# Patient Record
Sex: FEMALE | Race: BLACK OR AFRICAN AMERICAN | Employment: FULL TIME | URBAN - METROPOLITAN AREA
[De-identification: names, ages, dates, MRNs, and addresses within clinical notes are randomized per-mention and may not be internally consistent; named-entity substitution may affect disease eponyms.]

---

## 2017-05-25 ENCOUNTER — EMPLOYEE WELLNESS (OUTPATIENT)
Dept: OTHER | Age: 37
End: 2017-05-25

## 2017-05-25 LAB
CHOLESTEROL, TOTAL: 181 MG/DL (ref 0–199)
GLUCOSE BLD-MCNC: 83 MG/DL (ref 70–99)
HDLC SERPL-MCNC: 42 MG/DL (ref 40–60)
LDL CHOLESTEROL CALCULATED: 122 MG/DL
TRIGL SERPL-MCNC: 84 MG/DL (ref 0–150)

## 2017-07-03 ENCOUNTER — OFFICE VISIT (OUTPATIENT)
Dept: PRIMARY CARE CLINIC | Age: 37
End: 2017-07-03

## 2017-07-03 VITALS
TEMPERATURE: 97.4 F | HEIGHT: 60 IN | OXYGEN SATURATION: 97 % | SYSTOLIC BLOOD PRESSURE: 146 MMHG | HEART RATE: 94 BPM | BODY MASS INDEX: 45.35 KG/M2 | WEIGHT: 231 LBS | DIASTOLIC BLOOD PRESSURE: 92 MMHG

## 2017-07-03 DIAGNOSIS — M79.605 PAIN IN BOTH LOWER EXTREMITIES: Primary | ICD-10-CM

## 2017-07-03 DIAGNOSIS — M25.562 CHRONIC PAIN OF BOTH KNEES: ICD-10-CM

## 2017-07-03 DIAGNOSIS — M79.672 BILATERAL FOOT PAIN: ICD-10-CM

## 2017-07-03 DIAGNOSIS — M25.561 CHRONIC PAIN OF BOTH KNEES: ICD-10-CM

## 2017-07-03 DIAGNOSIS — G89.29 CHRONIC PAIN OF BOTH KNEES: ICD-10-CM

## 2017-07-03 DIAGNOSIS — E66.01 MORBID (SEVERE) OBESITY DUE TO EXCESS CALORIES (HCC): ICD-10-CM

## 2017-07-03 DIAGNOSIS — I10 ESSENTIAL HYPERTENSION: ICD-10-CM

## 2017-07-03 DIAGNOSIS — I83.90 VARICOSE VEIN OF LEG: ICD-10-CM

## 2017-07-03 DIAGNOSIS — E55.9 VITAMIN D DEFICIENCY: ICD-10-CM

## 2017-07-03 DIAGNOSIS — M79.604 PAIN IN BOTH LOWER EXTREMITIES: Primary | ICD-10-CM

## 2017-07-03 DIAGNOSIS — M79.671 BILATERAL FOOT PAIN: ICD-10-CM

## 2017-07-03 PROCEDURE — 99204 OFFICE O/P NEW MOD 45 MIN: CPT | Performed by: FAMILY MEDICINE

## 2017-07-03 RX ORDER — HYDROCHLOROTHIAZIDE 25 MG/1
TABLET ORAL
Qty: 90 TABLET | Refills: 3 | Status: SHIPPED | OUTPATIENT
Start: 2017-07-03 | End: 2018-10-04 | Stop reason: SDUPTHER

## 2017-07-03 RX ORDER — NAPROXEN 500 MG/1
500 TABLET ORAL 2 TIMES DAILY WITH MEALS
Qty: 60 TABLET | Refills: 3 | Status: SHIPPED | OUTPATIENT
Start: 2017-07-03 | End: 2017-07-22 | Stop reason: ALTCHOICE

## 2017-07-03 ASSESSMENT — ENCOUNTER SYMPTOMS
APNEA: 0
BACK PAIN: 0
SORE THROAT: 0
SINUS PRESSURE: 0
RECTAL PAIN: 0
EYE PAIN: 0
VOMITING: 0
WHEEZING: 0
COLOR CHANGE: 0
ABDOMINAL DISTENTION: 0
NAUSEA: 0
EYE DISCHARGE: 0
STRIDOR: 0
COUGH: 0
BLOOD IN STOOL: 0
SHORTNESS OF BREATH: 0
CHEST TIGHTNESS: 0
EYE REDNESS: 0
EYE ITCHING: 0
PHOTOPHOBIA: 0
RHINORRHEA: 0
TROUBLE SWALLOWING: 0
CONSTIPATION: 0
CHOKING: 0
DIARRHEA: 0

## 2017-07-03 ASSESSMENT — PATIENT HEALTH QUESTIONNAIRE - PHQ9
1. LITTLE INTEREST OR PLEASURE IN DOING THINGS: 0
2. FEELING DOWN, DEPRESSED OR HOPELESS: 0
SUM OF ALL RESPONSES TO PHQ QUESTIONS 1-9: 0
SUM OF ALL RESPONSES TO PHQ9 QUESTIONS 1 & 2: 0

## 2017-07-11 DIAGNOSIS — G89.29 CHRONIC PAIN OF BOTH KNEES: ICD-10-CM

## 2017-07-11 DIAGNOSIS — M79.672 BILATERAL FOOT PAIN: ICD-10-CM

## 2017-07-11 DIAGNOSIS — I83.90 VARICOSE VEIN OF LEG: ICD-10-CM

## 2017-07-11 DIAGNOSIS — M79.604 PAIN IN BOTH LOWER EXTREMITIES: ICD-10-CM

## 2017-07-11 DIAGNOSIS — M25.561 CHRONIC PAIN OF BOTH KNEES: ICD-10-CM

## 2017-07-11 DIAGNOSIS — E55.9 VITAMIN D DEFICIENCY: ICD-10-CM

## 2017-07-11 DIAGNOSIS — M79.671 BILATERAL FOOT PAIN: ICD-10-CM

## 2017-07-11 DIAGNOSIS — M79.605 PAIN IN BOTH LOWER EXTREMITIES: ICD-10-CM

## 2017-07-11 DIAGNOSIS — M25.562 CHRONIC PAIN OF BOTH KNEES: ICD-10-CM

## 2017-07-11 LAB
A/G RATIO: 1 (ref 1.1–2.2)
ALBUMIN SERPL-MCNC: 4 G/DL (ref 3.4–5)
ALP BLD-CCNC: 61 U/L (ref 40–129)
ALT SERPL-CCNC: 14 U/L (ref 10–40)
ANION GAP SERPL CALCULATED.3IONS-SCNC: 14 MMOL/L (ref 3–16)
AST SERPL-CCNC: 15 U/L (ref 15–37)
BACTERIA: ABNORMAL /HPF
BASOPHILS ABSOLUTE: 0 K/UL (ref 0–0.2)
BASOPHILS RELATIVE PERCENT: 0.5 %
BILIRUB SERPL-MCNC: <0.2 MG/DL (ref 0–1)
BILIRUBIN URINE: NEGATIVE
BLOOD, URINE: NEGATIVE
BUN BLDV-MCNC: 10 MG/DL (ref 7–20)
CALCIUM SERPL-MCNC: 9 MG/DL (ref 8.3–10.6)
CHLORIDE BLD-SCNC: 104 MMOL/L (ref 99–110)
CHOLESTEROL, TOTAL: 206 MG/DL (ref 0–199)
CLARITY: ABNORMAL
CO2: 25 MMOL/L (ref 21–32)
COLOR: YELLOW
CREAT SERPL-MCNC: 0.7 MG/DL (ref 0.6–1.1)
EOSINOPHILS ABSOLUTE: 0.4 K/UL (ref 0–0.6)
EOSINOPHILS RELATIVE PERCENT: 5.2 %
EPITHELIAL CELLS, UA: 9 /HPF (ref 0–5)
GFR AFRICAN AMERICAN: >60
GFR NON-AFRICAN AMERICAN: >60
GLOBULIN: 3.9 G/DL
GLUCOSE BLD-MCNC: 84 MG/DL (ref 70–99)
GLUCOSE URINE: NEGATIVE MG/DL
HCT VFR BLD CALC: 38.6 % (ref 36–48)
HDLC SERPL-MCNC: 47 MG/DL (ref 40–60)
HEMOGLOBIN: 12.6 G/DL (ref 12–16)
HYALINE CASTS: 1 /LPF (ref 0–8)
KETONES, URINE: NEGATIVE MG/DL
LDL CHOLESTEROL CALCULATED: 136 MG/DL
LEUKOCYTE ESTERASE, URINE: ABNORMAL
LYMPHOCYTES ABSOLUTE: 2.4 K/UL (ref 1–5.1)
LYMPHOCYTES RELATIVE PERCENT: 35.2 %
MAGNESIUM: 2.2 MG/DL (ref 1.8–2.4)
MCH RBC QN AUTO: 26.7 PG (ref 26–34)
MCHC RBC AUTO-ENTMCNC: 32.6 G/DL (ref 31–36)
MCV RBC AUTO: 81.9 FL (ref 80–100)
MICROSCOPIC EXAMINATION: YES
MONOCYTES ABSOLUTE: 0.2 K/UL (ref 0–1.3)
MONOCYTES RELATIVE PERCENT: 3.5 %
NEUTROPHILS ABSOLUTE: 3.8 K/UL (ref 1.7–7.7)
NEUTROPHILS RELATIVE PERCENT: 55.6 %
NITRITE, URINE: NEGATIVE
PDW BLD-RTO: 15 % (ref 12.4–15.4)
PH UA: 6.5
PLATELET # BLD: 381 K/UL (ref 135–450)
PMV BLD AUTO: 7.2 FL (ref 5–10.5)
POTASSIUM SERPL-SCNC: 4.3 MMOL/L (ref 3.5–5.1)
PROTEIN UA: NEGATIVE MG/DL
RBC # BLD: 4.72 M/UL (ref 4–5.2)
RBC UA: 8 /HPF (ref 0–4)
SEDIMENTATION RATE, ERYTHROCYTE: 54 MM/HR (ref 0–20)
SODIUM BLD-SCNC: 143 MMOL/L (ref 136–145)
SPECIFIC GRAVITY UA: 1.01
TOTAL PROTEIN: 7.9 G/DL (ref 6.4–8.2)
TRIGL SERPL-MCNC: 117 MG/DL (ref 0–150)
TSH SERPL DL<=0.05 MIU/L-ACNC: 1.55 UIU/ML (ref 0.27–4.2)
URIC ACID, SERUM: 5.7 MG/DL (ref 2.6–6)
URINE TYPE: ABNORMAL
UROBILINOGEN, URINE: 0.2 E.U./DL
VLDLC SERPL CALC-MCNC: 23 MG/DL
WBC # BLD: 6.8 K/UL (ref 4–11)
WBC UA: 5 /HPF (ref 0–5)

## 2017-07-12 ENCOUNTER — TELEPHONE (OUTPATIENT)
Dept: PRIMARY CARE CLINIC | Age: 37
End: 2017-07-12

## 2017-07-12 DIAGNOSIS — R70.0 ELEVATED SED RATE: ICD-10-CM

## 2017-07-12 DIAGNOSIS — R31.9 URINARY TRACT INFECTION WITH HEMATURIA, SITE UNSPECIFIED: Primary | ICD-10-CM

## 2017-07-12 DIAGNOSIS — M25.50 ARTHRALGIA, UNSPECIFIED JOINT: Primary | ICD-10-CM

## 2017-07-12 DIAGNOSIS — N39.0 URINARY TRACT INFECTION WITH HEMATURIA, SITE UNSPECIFIED: Primary | ICD-10-CM

## 2017-07-12 RX ORDER — NITROFURANTOIN 25; 75 MG/1; MG/1
CAPSULE ORAL
Qty: 14 CAPSULE | Refills: 0 | Status: SHIPPED | OUTPATIENT
Start: 2017-07-12 | End: 2017-07-22 | Stop reason: ALTCHOICE

## 2017-07-12 RX ORDER — NITROFURANTOIN MACROCRYSTALS 100 MG/1
CAPSULE ORAL
Qty: 14 CAPSULE | Refills: 0 | Status: SHIPPED | OUTPATIENT
Start: 2017-07-12 | End: 2017-07-12 | Stop reason: ALTCHOICE

## 2017-07-13 LAB
ANA INTERPRETATION: NORMAL
ANTI-NUCLEAR ANTIBODY (ANA): NEGATIVE

## 2017-07-14 ENCOUNTER — TELEPHONE (OUTPATIENT)
Dept: PRIMARY CARE CLINIC | Age: 37
End: 2017-07-14

## 2017-07-14 ENCOUNTER — HOSPITAL ENCOUNTER (OUTPATIENT)
Dept: OTHER | Age: 37
Discharge: HOME | End: 2017-07-14
Attending: FAMILY MEDICINE | Admitting: FAMILY MEDICINE

## 2017-07-14 DIAGNOSIS — M79.672 BILATERAL FOOT PAIN: ICD-10-CM

## 2017-07-14 DIAGNOSIS — M25.562 CHRONIC PAIN OF BOTH KNEES: ICD-10-CM

## 2017-07-14 DIAGNOSIS — G89.29 CHRONIC PAIN OF BOTH KNEES: ICD-10-CM

## 2017-07-14 DIAGNOSIS — M79.671 BILATERAL FOOT PAIN: ICD-10-CM

## 2017-07-14 DIAGNOSIS — M25.561 CHRONIC PAIN OF BOTH KNEES: ICD-10-CM

## 2017-07-14 LAB
ANA INTERPRETATION: ABNORMAL
ANA TITER: ABNORMAL
ANTI-NUCLEAR ANTIBODY (ANA): POSITIVE

## 2017-07-16 ENCOUNTER — TELEPHONE (OUTPATIENT)
Dept: PRIMARY CARE CLINIC | Age: 37
End: 2017-07-16

## 2017-07-17 ENCOUNTER — TELEPHONE (OUTPATIENT)
Dept: PRIMARY CARE CLINIC | Age: 37
End: 2017-07-17

## 2017-07-17 DIAGNOSIS — R76.8 ELEVATED ANTINUCLEAR ANTIBODY (ANA) LEVEL: ICD-10-CM

## 2017-07-17 DIAGNOSIS — M25.50 ARTHRALGIA, UNSPECIFIED JOINT: Primary | ICD-10-CM

## 2017-07-18 ENCOUNTER — TELEPHONE (OUTPATIENT)
Dept: INTERNAL MEDICINE CLINIC | Age: 37
End: 2017-07-18

## 2017-07-19 ENCOUNTER — OFFICE VISIT (OUTPATIENT)
Dept: URGENT CARE | Age: 37
End: 2017-07-19

## 2017-07-19 ENCOUNTER — TELEPHONE (OUTPATIENT)
Dept: PRIMARY CARE CLINIC | Age: 37
End: 2017-07-19

## 2017-07-19 VITALS
SYSTOLIC BLOOD PRESSURE: 167 MMHG | HEART RATE: 77 BPM | HEIGHT: 60 IN | WEIGHT: 228 LBS | RESPIRATION RATE: 16 BRPM | DIASTOLIC BLOOD PRESSURE: 98 MMHG | TEMPERATURE: 98 F | BODY MASS INDEX: 44.76 KG/M2

## 2017-07-19 DIAGNOSIS — R76.8 ELEVATED ANTINUCLEAR ANTIBODY (ANA) LEVEL: ICD-10-CM

## 2017-07-19 DIAGNOSIS — R52 GENERALIZED PAIN: Primary | ICD-10-CM

## 2017-07-19 DIAGNOSIS — R70.0 ELEVATED SED RATE: ICD-10-CM

## 2017-07-19 PROCEDURE — 99203 OFFICE O/P NEW LOW 30 MIN: CPT | Performed by: EMERGENCY MEDICINE

## 2017-07-19 RX ORDER — PREDNISONE 10 MG/1
TABLET ORAL
Qty: 23 TABLET | Refills: 0 | Status: SHIPPED | OUTPATIENT
Start: 2017-07-19 | End: 2017-07-22 | Stop reason: ALTCHOICE

## 2017-07-19 RX ORDER — METHYLPREDNISOLONE 4 MG/1
TABLET ORAL
Qty: 1 KIT | Refills: 0 | Status: SHIPPED | OUTPATIENT
Start: 2017-07-19 | End: 2017-07-22 | Stop reason: ALTCHOICE

## 2017-07-19 ASSESSMENT — ENCOUNTER SYMPTOMS
EYE DISCHARGE: 0
SORE THROAT: 0
NAUSEA: 0
COUGH: 0
ABDOMINAL PAIN: 0
RHINORRHEA: 0
SHORTNESS OF BREATH: 0
VOMITING: 0
EYE PAIN: 0
DIARRHEA: 0
TROUBLE SWALLOWING: 0
EYES NEGATIVE: 1

## 2017-08-22 ENCOUNTER — OFFICE VISIT (OUTPATIENT)
Dept: RHEUMATOLOGY | Age: 37
End: 2017-08-22

## 2017-08-22 VITALS
HEART RATE: 78 BPM | OXYGEN SATURATION: 96 % | DIASTOLIC BLOOD PRESSURE: 90 MMHG | HEIGHT: 60 IN | SYSTOLIC BLOOD PRESSURE: 150 MMHG

## 2017-08-22 DIAGNOSIS — M25.50 ARTHRALGIA, UNSPECIFIED JOINT: ICD-10-CM

## 2017-08-22 DIAGNOSIS — R76.8 POSITIVE ANA (ANTINUCLEAR ANTIBODY): ICD-10-CM

## 2017-08-22 DIAGNOSIS — M72.2 PLANTAR FASCIITIS: Primary | ICD-10-CM

## 2017-08-22 DIAGNOSIS — M72.2 PLANTAR FASCIITIS: ICD-10-CM

## 2017-08-22 DIAGNOSIS — R70.0 ELEVATED SED RATE: ICD-10-CM

## 2017-08-22 LAB
C-REACTIVE PROTEIN: 32.7 MG/L (ref 0–5.1)
HEPATITIS B CORE IGM ANTIBODY: NORMAL
HEPATITIS B SURFACE ANTIGEN INTERPRETATION: NORMAL
HEPATITIS C ANTIBODY INTERPRETATION: NORMAL
RHEUMATOID FACTOR: 35 IU/ML
SEDIMENTATION RATE, ERYTHROCYTE: 63 MM/HR (ref 0–20)

## 2017-08-22 PROCEDURE — 99244 OFF/OP CNSLTJ NEW/EST MOD 40: CPT | Performed by: INTERNAL MEDICINE

## 2017-08-23 LAB
ANA INTERPRETATION: NORMAL
ANTI-NUCLEAR ANTIBODY (ANA): NEGATIVE
ENA TO RNP ANTIBODY: NEGATIVE EU
ENA TO SMITH (SM) ANTIBODY: NEGATIVE EU
ENA TO SSA (RO) ANTIBODY: NEGATIVE EU
ENA TO SSB (LA) ANTIBODY: NEGATIVE EU

## 2017-08-24 LAB
ANA BY IFA: NORMAL
CCP IGG ANTIBODIES: 2 UNITS (ref 0–19)
DOUBLE STRANDED DNA AB, IGG: NORMAL

## 2017-11-14 ENCOUNTER — CLINICAL DOCUMENTATION (OUTPATIENT)
Dept: URGENT CARE | Age: 37
End: 2017-11-14

## 2017-11-14 NOTE — PROGRESS NOTES
Marvin Pastor is a 43 y.o. female patient. Current Outpatient Prescriptions   Medication Sig Dispense Refill    ibuprofen (ADVIL;MOTRIN) 800 MG tablet Take 1 tablet by mouth every 8 hours as needed for Pain 60 tablet 0    hydrochlorothiazide (HYDRODIURIL) 25 MG tablet TAKE 1 TABLET BY MOUTH EVERY DAY 90 tablet 3     No current facility-administered medications for this visit. No Known Allergies  Active Problems:    * No active hospital problems. *    not currently breastfeeding. Subjective  Objective:  Vital signs: (most recent): not currently breastfeeding. (Blood Pressure    Right arm 154/101  Left arm 155/99).       Assessment & Plan    Criselda Reis  11/14/2017

## 2017-12-08 ENCOUNTER — OFFICE VISIT (OUTPATIENT)
Dept: URGENT CARE | Age: 37
End: 2017-12-08

## 2017-12-08 VITALS
WEIGHT: 220 LBS | RESPIRATION RATE: 16 BRPM | BODY MASS INDEX: 43.19 KG/M2 | OXYGEN SATURATION: 98 % | DIASTOLIC BLOOD PRESSURE: 100 MMHG | HEIGHT: 60 IN | SYSTOLIC BLOOD PRESSURE: 171 MMHG | HEART RATE: 99 BPM | TEMPERATURE: 97.7 F

## 2017-12-08 DIAGNOSIS — J02.9 PHARYNGITIS, UNSPECIFIED ETIOLOGY: Primary | ICD-10-CM

## 2017-12-08 DIAGNOSIS — I10 ESSENTIAL HYPERTENSION: ICD-10-CM

## 2017-12-08 LAB — S PYO AG THROAT QL: NORMAL

## 2017-12-08 PROCEDURE — 87880 STREP A ASSAY W/OPTIC: CPT | Performed by: EMERGENCY MEDICINE

## 2017-12-08 PROCEDURE — G8427 DOCREV CUR MEDS BY ELIG CLIN: HCPCS | Performed by: EMERGENCY MEDICINE

## 2017-12-08 PROCEDURE — G8484 FLU IMMUNIZE NO ADMIN: HCPCS | Performed by: EMERGENCY MEDICINE

## 2017-12-08 PROCEDURE — 1036F TOBACCO NON-USER: CPT | Performed by: EMERGENCY MEDICINE

## 2017-12-08 PROCEDURE — G8417 CALC BMI ABV UP PARAM F/U: HCPCS | Performed by: EMERGENCY MEDICINE

## 2017-12-08 PROCEDURE — 99214 OFFICE O/P EST MOD 30 MIN: CPT | Performed by: EMERGENCY MEDICINE

## 2017-12-08 ASSESSMENT — ENCOUNTER SYMPTOMS
COUGH: 0
SORE THROAT: 1

## 2017-12-08 NOTE — PROGRESS NOTES
Subjective:      Patient ID: Nilda Osei is a 43 y.o. female. Pharyngitis   This is a new problem. Episode onset: 2 days ago. The problem occurs constantly. The problem has been gradually worsening. Associated symptoms include a sore throat. Pertinent negatives include no chills, coughing, fever, headaches or rash. The symptoms are aggravated by swallowing. Treatments tried: OTC cold meds. The treatment provided no relief. Review of Systems   Constitutional: Negative for chills and fever. HENT: Positive for sore throat. Respiratory: Negative for cough. Skin: Negative for rash. Neurological: Negative for headaches. All other systems reviewed and are negative. Objective:   Physical Exam   Constitutional: She is oriented to person, place, and time. She appears well-developed and well-nourished. No distress. HENT:   Head: Normocephalic and atraumatic. Right Ear: External ear normal.   Left Ear: External ear normal.   Nose: Nose normal.   Mouth/Throat: Posterior oropharyngeal erythema present. No oropharyngeal exudate. Eyes: Conjunctivae and EOM are normal. Pupils are equal, round, and reactive to light. Neck: Normal range of motion. Neck supple. No JVD present. Cardiovascular: Normal rate, regular rhythm, normal heart sounds and intact distal pulses. Pulmonary/Chest: Effort normal and breath sounds normal. No stridor. No respiratory distress. She has no wheezes. She has no rales. Musculoskeletal: Normal range of motion. Lymphadenopathy:     She has no cervical adenopathy. Neurological: She is alert and oriented to person, place, and time. She has normal reflexes. Skin: Skin is warm and dry. She is not diaphoretic. Psychiatric: She has a normal mood and affect. Her behavior is normal. Judgment and thought content normal.   Nursing note and vitals reviewed. Assessment:      1. Pharyngitis, unspecified etiology    2.  Essential hypertension            Plan: Sea was seen today for pharyngitis. Diagnoses and all orders for this visit:    Pharyngitis, unspecified etiology  -     POCT rapid strep A    Essential hypertension      Rapid strep was negative. I explained to the patient that she likely has a viral illness. Patient to follow up with her PCP regarding her blood pressure.

## 2017-12-08 NOTE — PATIENT INSTRUCTIONS
feeling in the chest.  ¨ Sweating. ¨ Shortness of breath. ¨ Nausea or vomiting. ¨ Pain, pressure, or a strange feeling in the back, neck, jaw, or upper belly or in one or both shoulders or arms. ¨ Lightheadedness or sudden weakness. ¨ A fast or irregular heartbeat. ? · You have symptoms of a stroke. These may include:  ¨ Sudden numbness, tingling, weakness, or loss of movement in your face, arm, or leg, especially on only one side of your body. ¨ Sudden vision changes. ¨ Sudden trouble speaking. ¨ Sudden confusion or trouble understanding simple statements. ¨ Sudden problems with walking or balance. ¨ A sudden, severe headache that is different from past headaches. ? · You have severe back or belly pain. ?Do not wait until your blood pressure comes down on its own. Get help right away. ?Call your doctor now or seek immediate care if:  ? · Your blood pressure is much higher than normal (such as 180/110 or higher), but you don't have symptoms. ? · You think high blood pressure is causing symptoms, such as:  ¨ Severe headache. ¨ Blurry vision. ? Watch closely for changes in your health, and be sure to contact your doctor if:  ? · Your blood pressure measures 140/90 or higher at least 2 times. That means the top number is 140 or higher or the bottom number is 90 or higher, or both. ? · You think you may be having side effects from your blood pressure medicine. ? · Your blood pressure is usually normal, but it goes above normal at least 2 times. Where can you learn more? Go to https://YAMAPpeArborMetrix.[employer]. org and sign in to your MoodMe account. Enter E748 in the menuvox box to learn more about \"High Blood Pressure: Care Instructions. \"     If you do not have an account, please click on the \"Sign Up Now\" link. Current as of: September 21, 2016  Content Version: 11.4  © 3281-4204 Healthwise, [employer]. Care instructions adapted under license by [facility] giddy [facility] ([facility]).  If you have questions about a medical condition or this instruction, always ask your healthcare professional. [address] any warranty or liability for your use of this information. Patient Education        Sore Throat: Care Instructions  Your Care Instructions    Infection by bacteria or a virus causes most sore throats. Cigarette smoke, dry air, air pollution, allergies, and yelling can also cause a sore throat. Sore throats can be painful and annoying. Fortunately, most sore throats go away on their own. If you have a bacterial infection, your doctor may prescribe antibiotics. Follow-up care is a key part of your treatment and safety. Be sure to make and go to all appointments, and call your doctor if you are having problems. It's also a good idea to know your test results and keep a list of the medicines you take. How can you care for yourself at home? · If your doctor prescribed antibiotics, take them as directed. Do not stop taking them just because you feel better. You need to take the full course of antibiotics. · Gargle with warm salt water once an hour to help reduce swelling and relieve discomfort. Use 1 teaspoon of salt mixed in 1 cup of warm water. · Take an over-the-counter pain medicine, such as acetaminophen (Tylenol), ibuprofen (Advil, Motrin), or naproxen (Aleve). Read and follow all instructions on the label. · Be careful when taking over-the-counter cold or flu medicines and Tylenol at the same time. Many of these medicines have acetaminophen, which is Tylenol. Read the labels to make sure that you are not taking more than the recommended dose. Too much acetaminophen (Tylenol) can be harmful. · Drink plenty of fluids. Fluids may help soothe an irritated throat. Hot fluids, such as tea or soup, may help decrease throat pain. · Use over-the-counter throat lozenges to soothe pain. Regular cough drops or hard candy may also help.  These should not be given to young children because of the risk of choking. · Do not smoke or allow others to smoke around you. If you need help quitting, talk to your doctor about stop-smoking programs and medicines. These can increase your chances of quitting for good. · Use a vaporizer or humidifier to add moisture to your bedroom. Follow the directions for cleaning the machine. When should you call for help? Call your doctor now or seek immediate medical care if:  ? · You have new or worse trouble swallowing. ? · Your sore throat gets much worse on one side. ? Watch closely for changes in your health, and be sure to contact your doctor if you do not get better as expected. Where can you learn more? Go to https://TopTechPhotoeb.[employer]i. org and sign in to your Temptster account. Enter Z946 in the Growlife box to learn more about \"Sore Throat: Care Instructions. \"     If you do not have an account, please click on the \"Sign Up Now\" link. Current as of: May 12, 2017  Content Version: 11.4  © 3655-7481 Healthwise, Incorporated. Care instructions adapted under license by [facility] ([facility]). If you have questions about a medical condition or this instruction, always ask your healthcare professional. [address] any warranty or liability for your use of this information.

## 2018-02-21 ENCOUNTER — OFFICE VISIT (OUTPATIENT)
Dept: URGENT CARE | Age: 38
End: 2018-02-21

## 2018-02-21 VITALS
DIASTOLIC BLOOD PRESSURE: 91 MMHG | WEIGHT: 223 LBS | TEMPERATURE: 99 F | RESPIRATION RATE: 14 BRPM | HEART RATE: 77 BPM | BODY MASS INDEX: 43.55 KG/M2 | OXYGEN SATURATION: 95 % | SYSTOLIC BLOOD PRESSURE: 142 MMHG

## 2018-02-21 DIAGNOSIS — R03.0 ELEVATED BLOOD PRESSURE READING: ICD-10-CM

## 2018-02-21 DIAGNOSIS — N93.9 VAGINAL SPOTTING: Primary | ICD-10-CM

## 2018-02-21 LAB
BILIRUBIN, POC: NEGATIVE
BLOOD URINE, POC: NEGATIVE
CLARITY, POC: CLEAR
COLOR, POC: YELLOW
CONTROL: PRESENT
GLUCOSE URINE, POC: NEGATIVE
KETONES, POC: NEGATIVE
LEUKOCYTE EST, POC: ABNORMAL
NITRITE, POC: NEGATIVE
PH, POC: 6
PREGNANCY TEST URINE, POC: NEGATIVE
PROTEIN, POC: ABNORMAL
SPECIFIC GRAVITY, POC: 1.02
UROBILINOGEN, POC: 0.2

## 2018-02-21 PROCEDURE — 81002 URINALYSIS NONAUTO W/O SCOPE: CPT | Performed by: EMERGENCY MEDICINE

## 2018-02-21 PROCEDURE — G8417 CALC BMI ABV UP PARAM F/U: HCPCS | Performed by: EMERGENCY MEDICINE

## 2018-02-21 PROCEDURE — G8484 FLU IMMUNIZE NO ADMIN: HCPCS | Performed by: EMERGENCY MEDICINE

## 2018-02-21 PROCEDURE — 99214 OFFICE O/P EST MOD 30 MIN: CPT | Performed by: EMERGENCY MEDICINE

## 2018-02-21 PROCEDURE — 1036F TOBACCO NON-USER: CPT | Performed by: EMERGENCY MEDICINE

## 2018-02-21 PROCEDURE — 81025 URINE PREGNANCY TEST: CPT | Performed by: EMERGENCY MEDICINE

## 2018-02-21 PROCEDURE — G8427 DOCREV CUR MEDS BY ELIG CLIN: HCPCS | Performed by: EMERGENCY MEDICINE

## 2018-02-21 ASSESSMENT — ENCOUNTER SYMPTOMS
COUGH: 0
VOMITING: 0
EYES NEGATIVE: 1
SHORTNESS OF BREATH: 0
ABDOMINAL PAIN: 0
NAUSEA: 0

## 2018-02-21 NOTE — PATIENT INSTRUCTIONS
blood pressure if your top number is 140 or higher or your bottom number is 90 or higher, or both. The main test for high blood pressure is simple, fast, and painless. To diagnose high blood pressure, your doctor will test your blood pressure at different times. After testing your blood pressure, your doctor may ask you to test it again when you are home. If you are diagnosed with high blood pressure, you can work with your doctor to make a long-term plan to manage it. Follow-up care is a key part of your treatment and safety. Be sure to make and go to all appointments, and call your doctor if you are having problems. It's also a good idea to know your test results and keep a list of the medicines you take. How can you care for yourself at home? · Do not smoke. Smoking increases your risk for heart attack and stroke. If you need help quitting, talk to your doctor about stop-smoking programs and medicines. These can increase your chances of quitting for good. · Stay at a healthy weight. · Try to limit how much sodium you eat to less than 2,300 milligrams (mg) a day. Your doctor may ask you to try to eat less than 1,500 mg a day. · Be physically active. Get at least 30 minutes of exercise on most days of the week. Walking is a good choice. You also may want to do other activities, such as running, swimming, cycling, or playing tennis or team sports. · Avoid or limit alcohol. Talk to your doctor about whether you can drink any alcohol. · Eat plenty of fruits, vegetables, and low-fat dairy products. Eat less saturated and total fats. · Learn how to check your blood pressure at home. When should you call for help? Call your doctor now or seek immediate medical care if:  ? · Your blood pressure is much higher than normal (such as 180/110 or higher). ? · You think high blood pressure is causing symptoms such as:  ¨ Severe headache. ¨ Blurry vision. ? Watch closely for changes in your health, and be sure to

## 2018-02-21 NOTE — PROGRESS NOTES
other serious conditions can cause bleeding. So you may need more tests to find the cause of your bleeding. The doctor has checked you carefully, but problems can develop later. If you notice any problems or new symptoms, get medical treatment right away. Follow-up care is a key part of your treatment and safety. Be sure to make and go to all appointments, and call your doctor if you are having problems. It's also a good idea to know your test results and keep a list of the medicines you take. How can you care for yourself at home? · Take pain medicines exactly as directed. ¨ If the doctor gave you a prescription medicine for pain, take it as prescribed. ¨ If you are not taking a prescription pain medicine, ask your doctor if you can take an over-the-counter medicine. Do not take aspirin, which may make bleeding worse. · If your doctor prescribed birth control pills for your bleeding, take them as directed. · Eat foods that are high in iron and vitamin C. Foods high in iron include red meat, shellfish, eggs, beans, and leafy green vegetables. Foods high in vitamin C include citrus fruits, tomatoes, and broccoli. Ask your doctor if you need to take iron pills or a multivitamin. · Ask your doctor when it is okay to have sex. When should you call for help? Call 911 anytime you think you may need emergency care. For example, call if:  ? · You passed out (lost consciousness). ?Call your doctor now or seek immediate medical care if:  ? · You have severe vaginal bleeding. ? · You are dizzy or lightheaded, or you feel like you may faint. ? · You have new or worse belly or pelvic pain. ? Watch closely for changes in your health, and be sure to contact your doctor if:  ? · Your bleeding gets worse. ? · You think you might be pregnant. ? · You do not get better as expected. Where can you learn more? Go to https://anjel.[employer]. org and sign in to your Double Fusion account.  Enter C201 in the Search Health Information box to learn more about \"Vaginal Bleeding in Nonpregnant Women: Care Instructions. \"     If you do not have an account, please click on the \"Sign Up Now\" link. Current as of: October 13, 2016  Content Version: 11.5  © 5699-8984 Healthwise, [employer]. Care instructions adapted under license by [facility] ([facility]). If you have questions about a medical condition or this instruction, always ask your healthcare professional. [address] any warranty or liability for your use of this information. Patient Education        Elevated Blood Pressure: Care Instructions  Your Care Instructions    Blood pressure is a measure of how hard the blood pushes against the walls of your arteries. It's normal for blood pressure to go up and down throughout the day. But if it stays up over time, you have high blood pressure. Two numbers tell you your blood pressure. The first number is the systolic pressure. It shows how hard the blood pushes when your heart is pumping. The second number is the diastolic pressure. It shows how hard the blood pushes between heartbeats, when your heart is relaxed and filling with blood. An ideal blood pressure in adults is less than 120/80 (say \"120 over 80\"). High blood pressure is 140/90 or higher. You have high blood pressure if your top number is 140 or higher or your bottom number is 90 or higher, or both. The main test for high blood pressure is simple, fast, and painless. To diagnose high blood pressure, your doctor will test your blood pressure at different times. After testing your blood pressure, your doctor may ask you to test it again when you are home. If you are diagnosed with high blood pressure, you can work with your doctor to make a long-term plan to manage it. Follow-up care is a key part of your treatment and safety. Be sure to make and go to all appointments, and call your doctor if you are having problems.  It's also a good idea to disclaims any warranty or liability for your use of this information.

## 2018-03-20 VITALS — WEIGHT: 224 LBS | BODY MASS INDEX: 43.75 KG/M2

## 2018-05-21 ENCOUNTER — OFFICE VISIT (OUTPATIENT)
Dept: OBGYN CLINIC | Age: 38
End: 2018-05-21

## 2018-05-21 VITALS
DIASTOLIC BLOOD PRESSURE: 80 MMHG | TEMPERATURE: 98.2 F | BODY MASS INDEX: 43.51 KG/M2 | WEIGHT: 221.6 LBS | SYSTOLIC BLOOD PRESSURE: 132 MMHG | HEIGHT: 60 IN | HEART RATE: 77 BPM

## 2018-05-21 DIAGNOSIS — Z11.3 SCREEN FOR STD (SEXUALLY TRANSMITTED DISEASE): ICD-10-CM

## 2018-05-21 DIAGNOSIS — Z01.419 WOMEN'S ANNUAL ROUTINE GYNECOLOGICAL EXAMINATION: Primary | ICD-10-CM

## 2018-05-21 DIAGNOSIS — E66.01 MORBID OBESITY WITH BMI OF 40.0-44.9, ADULT (HCC): ICD-10-CM

## 2018-05-21 DIAGNOSIS — N93.9 ABNORMAL UTERINE BLEEDING: ICD-10-CM

## 2018-05-21 DIAGNOSIS — Z12.4 PAP SMEAR FOR CERVICAL CANCER SCREENING: ICD-10-CM

## 2018-05-21 DIAGNOSIS — I10 HYPERTENSION, UNSPECIFIED TYPE: ICD-10-CM

## 2018-05-21 PROCEDURE — 99385 PREV VISIT NEW AGE 18-39: CPT | Performed by: OBSTETRICS & GYNECOLOGY

## 2018-05-21 PROCEDURE — 36415 COLL VENOUS BLD VENIPUNCTURE: CPT | Performed by: OBSTETRICS & GYNECOLOGY

## 2018-05-22 LAB
BASOPHILS ABSOLUTE: 0.1 K/UL (ref 0–0.2)
BASOPHILS RELATIVE PERCENT: 1 %
C TRACH DNA GENITAL QL NAA+PROBE: NEGATIVE
EOSINOPHILS ABSOLUTE: 0.4 K/UL (ref 0–0.6)
EOSINOPHILS RELATIVE PERCENT: 4.3 %
HCT VFR BLD CALC: 36.8 % (ref 36–48)
HEMOGLOBIN: 12.2 G/DL (ref 12–16)
LYMPHOCYTES ABSOLUTE: 3.5 K/UL (ref 1–5.1)
LYMPHOCYTES RELATIVE PERCENT: 40 %
MCH RBC QN AUTO: 26.6 PG (ref 26–34)
MCHC RBC AUTO-ENTMCNC: 33.3 G/DL (ref 31–36)
MCV RBC AUTO: 79.8 FL (ref 80–100)
MISCELLANEOUS LAB TEST ORDER: NORMAL
MONOCYTES ABSOLUTE: 0.5 K/UL (ref 0–1.3)
MONOCYTES RELATIVE PERCENT: 5.8 %
N. GONORRHOEAE DNA: NEGATIVE
NEUTROPHILS ABSOLUTE: 4.3 K/UL (ref 1.7–7.7)
NEUTROPHILS RELATIVE PERCENT: 48.9 %
PDW BLD-RTO: 15.4 % (ref 12.4–15.4)
PLATELET # BLD: 400 K/UL (ref 135–450)
PLATELET SLIDE REVIEW: ABNORMAL
PMV BLD AUTO: 7.9 FL (ref 5–10.5)
RBC # BLD: 4.61 M/UL (ref 4–5.2)
RPR: NORMAL
SLIDE REVIEW: ABNORMAL
TSH REFLEX: 1.23 UIU/ML (ref 0.27–4.2)
WBC # BLD: 8.8 K/UL (ref 4–11)

## 2018-05-23 LAB
HERPES TYPE 1/2 IGM COMBINED: 1.04 IV
HERPES TYPE I/II IGG COMBINED: >22.4 IV
HIV AG/AB: NORMAL
HIV ANTIGEN: NORMAL
HIV-1 ANTIBODY: NORMAL
HIV-2 AB: NORMAL
HPV COMMENT: ABNORMAL
HPV TYPE 16: NOT DETECTED
HPV TYPE 18: NOT DETECTED
HPVOH (OTHER TYPES): DETECTED
HSV 1 GLYCOPROTEIN G AB IGG: 21.1 IV
HSV 2 GLYCOPROTEIN G AB IGG: 19.4 IV

## 2018-05-25 ENCOUNTER — EMPLOYEE WELLNESS (OUTPATIENT)
Dept: OTHER | Age: 38
End: 2018-05-25

## 2018-05-25 LAB
CHOLESTEROL, TOTAL: 199 MG/DL (ref 0–199)
GLUCOSE BLD-MCNC: 86 MG/DL (ref 70–99)
HDLC SERPL-MCNC: 44 MG/DL (ref 40–60)
LDL CHOLESTEROL CALCULATED: 141 MG/DL
TRIGL SERPL-MCNC: 71 MG/DL (ref 0–150)

## 2018-05-28 PROBLEM — E66.01 MORBID OBESITY WITH BMI OF 40.0-44.9, ADULT (HCC): Status: ACTIVE | Noted: 2018-05-28

## 2018-05-28 ASSESSMENT — ENCOUNTER SYMPTOMS
SHORTNESS OF BREATH: 0
ABDOMINAL DISTENTION: 0
CONSTIPATION: 0
ABDOMINAL PAIN: 0
NAUSEA: 0
DIARRHEA: 0
VOMITING: 0

## 2018-05-29 VITALS — BODY MASS INDEX: 43.16 KG/M2 | WEIGHT: 221 LBS

## 2018-06-12 ENCOUNTER — OFFICE VISIT (OUTPATIENT)
Dept: OBGYN CLINIC | Age: 38
End: 2018-06-12

## 2018-06-12 VITALS
HEART RATE: 84 BPM | DIASTOLIC BLOOD PRESSURE: 84 MMHG | WEIGHT: 221.4 LBS | HEIGHT: 60 IN | SYSTOLIC BLOOD PRESSURE: 128 MMHG | TEMPERATURE: 98.4 F | BODY MASS INDEX: 43.47 KG/M2

## 2018-06-12 DIAGNOSIS — E66.01 MORBID OBESITY WITH BMI OF 40.0-44.9, ADULT (HCC): ICD-10-CM

## 2018-06-12 DIAGNOSIS — R87.612 PAP SMEAR ABNORMALITY OF CERVIX WITH LGSIL: Primary | ICD-10-CM

## 2018-06-12 DIAGNOSIS — R87.810 CERVICAL HIGH RISK HPV (HUMAN PAPILLOMAVIRUS) TEST POSITIVE: ICD-10-CM

## 2018-06-12 PROCEDURE — 99999 PR OFFICE/OUTPT VISIT,PROCEDURE ONLY: CPT | Performed by: OBSTETRICS & GYNECOLOGY

## 2018-06-12 PROCEDURE — 57454 BX/CURETT OF CERVIX W/SCOPE: CPT | Performed by: OBSTETRICS & GYNECOLOGY

## 2018-06-19 ENCOUNTER — TELEPHONE (OUTPATIENT)
Dept: OBGYN CLINIC | Age: 38
End: 2018-06-19

## 2018-06-26 ENCOUNTER — OFFICE VISIT (OUTPATIENT)
Dept: OBGYN CLINIC | Age: 38
End: 2018-06-26

## 2018-06-26 VITALS
HEART RATE: 80 BPM | DIASTOLIC BLOOD PRESSURE: 72 MMHG | SYSTOLIC BLOOD PRESSURE: 128 MMHG | TEMPERATURE: 98.2 F | BODY MASS INDEX: 43 KG/M2 | WEIGHT: 220.2 LBS

## 2018-06-26 DIAGNOSIS — N87.9 DYSPLASIA OF CERVIX UTERI: ICD-10-CM

## 2018-06-26 DIAGNOSIS — N92.6 IRREGULAR MENSES: Primary | ICD-10-CM

## 2018-06-26 DIAGNOSIS — D25.9 UTERINE LEIOMYOMA, UNSPECIFIED LOCATION: ICD-10-CM

## 2018-06-26 DIAGNOSIS — D25.2 SUBSEROUS LEIOMYOMA OF UTERUS: Primary | ICD-10-CM

## 2018-06-26 DIAGNOSIS — E66.01 MORBID OBESITY WITH BMI OF 40.0-44.9, ADULT (HCC): ICD-10-CM

## 2018-06-26 PROCEDURE — 99213 OFFICE O/P EST LOW 20 MIN: CPT | Performed by: OBSTETRICS & GYNECOLOGY

## 2018-06-26 PROCEDURE — 76856 US EXAM PELVIC COMPLETE: CPT | Performed by: OBSTETRICS & GYNECOLOGY

## 2018-07-04 PROBLEM — D25.2 SUBSEROUS LEIOMYOMA OF UTERUS: Status: ACTIVE | Noted: 2018-07-04

## 2018-07-04 PROBLEM — N87.9 DYSPLASIA OF CERVIX UTERI: Status: ACTIVE | Noted: 2018-07-04

## 2018-07-04 ASSESSMENT — ENCOUNTER SYMPTOMS
CONSTIPATION: 0
NAUSEA: 0
DIARRHEA: 0
VOMITING: 0
ABDOMINAL PAIN: 0
SHORTNESS OF BREATH: 0

## 2018-07-05 NOTE — PROGRESS NOTES
Subjective:      Patient ID: Manny Robison is a 42 y.o. female. HPI  43 y/o [id number] female presents for ultrasound and follow up secondary to irregular bleeding . Menarche occurred age 3. Menses occur every month x 5 days, medium flow, 5 pads per day, cramps. Has noted irregular spotting since 2008--spotting primarily occurs 5 days prior to menses. Patient recently seen on 6/12/18 for colposcopy secondary to abnormal pap smear. 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 Sexually active, no problems, monogamous x 10 months. [place]che occurred age 13. Diagnosed with PID at age 13.  < 22 lifetime partners. Monogamous from age [age]. No family history of ovarian or breast cancer. Family history significant for cervical cancer in sister requiring hysterectomy. Review of Systems   Constitutional: Negative. Negative for activity change, appetite change, chills, fatigue and fever. Respiratory: Negative for shortness of breath. Cardiovascular: Negative for chest pain. Gastrointestinal: Negative for abdominal pain, constipation, diarrhea, nausea and vomiting. Genitourinary: Positive for menstrual problem. Negative for difficulty urinating, dysuria, hematuria, pelvic pain, vaginal bleeding, vaginal discharge and vaginal pain. Psychiatric/Behavioral: Negative. Objective:   Physical Exam   Constitutional: She is oriented to person, place, and time. She appears well-developed and well-nourished. No distress. Abdominal: Soft. She exhibits no distension. There is no tenderness. There is no rebound and no guarding. Neurological: She is alert and oriented to person, place, and time. Skin: Skin is warm and dry. She is not diaphoretic. Psychiatric: She has a normal mood and affect. Her behavior is normal. Judgment and thought content normal.   Nursing note and vitals reviewed.     PELVIC

## 2018-07-18 ENCOUNTER — TELEPHONE (OUTPATIENT)
Dept: PRIMARY CARE CLINIC | Age: 38
End: 2018-07-18

## 2018-07-18 ENCOUNTER — OFFICE VISIT (OUTPATIENT)
Dept: PRIMARY CARE CLINIC | Age: 38
End: 2018-07-18

## 2018-07-18 ENCOUNTER — TELEPHONE (OUTPATIENT)
Dept: OBGYN CLINIC | Age: 38
End: 2018-07-18

## 2018-07-18 VITALS
OXYGEN SATURATION: 98 % | WEIGHT: 217 LBS | HEART RATE: 88 BPM | BODY MASS INDEX: 42.6 KG/M2 | HEIGHT: 60 IN | TEMPERATURE: 97.6 F | SYSTOLIC BLOOD PRESSURE: 142 MMHG | DIASTOLIC BLOOD PRESSURE: 85 MMHG

## 2018-07-18 DIAGNOSIS — I10 ESSENTIAL HYPERTENSION: ICD-10-CM

## 2018-07-18 DIAGNOSIS — R87.612 LGSIL ON PAP SMEAR OF CERVIX: ICD-10-CM

## 2018-07-18 DIAGNOSIS — E66.01 MORBIDLY OBESE (HCC): ICD-10-CM

## 2018-07-18 DIAGNOSIS — R87.810 CERVICAL HIGH RISK HPV (HUMAN PAPILLOMAVIRUS) TEST POSITIVE: ICD-10-CM

## 2018-07-18 DIAGNOSIS — Z01.818 PRE-OP EXAM: Primary | ICD-10-CM

## 2018-07-18 PROCEDURE — 93000 ELECTROCARDIOGRAM COMPLETE: CPT | Performed by: FAMILY MEDICINE

## 2018-07-18 PROCEDURE — 99242 OFF/OP CONSLTJ NEW/EST SF 20: CPT | Performed by: FAMILY MEDICINE

## 2018-07-18 ASSESSMENT — ENCOUNTER SYMPTOMS
COLOR CHANGE: 0
SHORTNESS OF BREATH: 0
RECTAL PAIN: 0
SINUS PRESSURE: 0
EYE DISCHARGE: 0
CHOKING: 0
STRIDOR: 0
CHEST TIGHTNESS: 0
BACK PAIN: 0
COUGH: 0
RHINORRHEA: 0
DIARRHEA: 0
TROUBLE SWALLOWING: 0
APNEA: 0
WHEEZING: 0
EYE ITCHING: 0
EYE REDNESS: 0
PHOTOPHOBIA: 0
EYE PAIN: 0
BLOOD IN STOOL: 0
ABDOMINAL DISTENTION: 0
VOMITING: 0
CONSTIPATION: 0
NAUSEA: 0
SORE THROAT: 0

## 2018-07-18 ASSESSMENT — PATIENT HEALTH QUESTIONNAIRE - PHQ9
SUM OF ALL RESPONSES TO PHQ9 QUESTIONS 1 & 2: 0
2. FEELING DOWN, DEPRESSED OR HOPELESS: 0
1. LITTLE INTEREST OR PLEASURE IN DOING THINGS: 0
SUM OF ALL RESPONSES TO PHQ QUESTIONS 1-9: 0

## 2018-07-18 NOTE — PROGRESS NOTES
Neck: Normal range of motion. Neck supple. No JVD present. No tracheal deviation present. No thyromegaly present. Cardiovascular: Normal rate, regular rhythm, normal heart sounds and intact distal pulses. Exam reveals no gallop and no friction rub. No murmur heard. Pulmonary/Chest: Effort normal and breath sounds normal. No stridor. No respiratory distress. She has no wheezes. She has no rales. She exhibits no tenderness. Abdominal: Soft. Bowel sounds are normal. She exhibits no distension and no mass. There is no tenderness. There is no rebound and no guarding. Musculoskeletal: Normal range of motion. She exhibits no edema or tenderness. Lymphadenopathy:     She has no cervical adenopathy. Neurological: She is alert and oriented to person, place, and time. She has normal reflexes. No cranial nerve deficit. Coordination normal.   Skin: Skin is warm and dry. No rash noted. She is not diaphoretic. No erythema. No pallor. Psychiatric: She has a normal mood and affect. Her behavior is normal. Judgment and thought content normal.   Nursing note and vitals reviewed. Assessment:      1. Pre-op exam    - EKG 12 Lead; Future  - Comprehensive Metabolic Panel; Future  - Urinalysis; Future    2. Cervical high risk HPV (human papillomavirus) test positive      3. LGSIL on Pap smear of cervix      4. Essential hypertension  bp borderline high  Cont. hctz  Dash diet, low salt diet  Stop ibuprofen  - Lipid Panel; Future    5.  Morbidly obese (HCC)      diet        Plan:      Medically clear pending renal function and ekg, and urine testing

## 2018-07-18 NOTE — TELEPHONE ENCOUNTER
Pt has appointment today for H&P with Dr Jennifer Arita. Faxed recent OV,US and pathology report to office @ [phone].

## 2018-07-18 NOTE — TELEPHONE ENCOUNTER
[facility] for letter as long as patient is aware that there may be unexpected issues with any surgery.

## 2018-07-18 NOTE — TELEPHONE ENCOUNTER
Pt called our office and would like to have a letter that states it is ok to return to work 7/31/18 post surgery. Letter if approved will need to be faxed to [phone]. Thank you. Please advise if ok for letter.

## 2018-07-18 NOTE — PATIENT INSTRUCTIONS
Patient Education        How to Read a Food Label to Limit Sodium: Care Instructions  Your Care Instructions  Sodium causes your body to hold on to extra water. This can raise your blood pressure and force your heart and kidneys to work harder. In very serious cases, this could cause you to be put in the hospital. It might even be life-threatening. By limiting sodium, you will feel better and lower your risk of serious problems. Processed foods, fast food, and restaurant foods are the major sources of dietary sodium. The most common name for sodium is salt. Try to limit how much sodium you eat to less than 2,300 milligrams (mg) a day. If you limit your sodium to 1,500 mg a day, you can lower your blood pressure even more. This limit counts all the salt that you eat in foods you cook or in packaged foods. Keep a list of everything you eat and drink. Follow-up care is a key part of your treatment and safety. Be sure to make and go to all appointments, and call your doctor if you are having problems. It's also a good idea to know your test results and keep a list of the medicines you take. How can you care for yourself at home? Read ingredient lists on food labels  · Read the list of ingredients on food labels to help you find how much sodium is in a food. The label lists the ingredients in a food in descending order (from the most to the least). If salt or sodium is high on the list, there may be a lot of sodium in the food. · Know that sodium has different names. Sodium is also called monosodium glutamate (MSG, common in [facility] food), sodium citrate, sodium alginate, sodium hydroxide, and sodium phosphate. Read Nutrition Facts labels  · On most foods, there is a Nutrition Facts label. This will tell you how much sodium is in one serving of food. Look at both the serving size and the sodium amount. The serving size is located at the top of the label, usually right under the \"Nutrition Facts\" title.  The amount (baking soda). MSG is often added to Asian food. When you eat out, you can sometimes ask for food without MSG or added salt. Buy low-sodium foods  · Buy foods that are labeled \"unsalted\" (no salt added), \"sodium-free\" (less than 5 mg of sodium per serving), or \"low-sodium\" (less than 140 mg of sodium per serving). Foods labeled \"reduced-sodium\" and \"light sodium\" may still have too much sodium. Be sure to read the label to see how much sodium you are getting. · Buy fresh vegetables, or frozen vegetables without added sauces. Buy low-sodium versions of canned vegetables, soups, and other canned goods. Prepare low-sodium meals  · Cut back on the amount of salt you use in cooking. This will help you adjust to the taste. Do not add salt after cooking. One teaspoon of salt has about 2,300 mg of sodium. · Take the salt shaker off the table. · Flavor your food with garlic, lemon juice, onion, vinegar, herbs, and spices. Do not use soy sauce, lite soy sauce, steak sauce, onion salt, garlic salt, celery salt, mustard, or ketchup on your food. · Use low-sodium salad dressings, sauces, and ketchup. Or make your own salad dressings and sauces without adding salt. · Use less salt (or none) when recipes call for it. You can often use half the salt a recipe calls for without losing flavor. Other foods such as rice, pasta, and grains do not need added salt. · Rinse canned vegetables, and cook them in fresh water. This removes some-but not all-of the salt. · Avoid water that is naturally high in sodium or that has been treated with water softeners, which add sodium. Call your local water company to find out the sodium content of your water supply. If you buy bottled water, read the label and choose a sodium-free brand. Avoid high-sodium foods  · Avoid eating:  ¨ Smoked, cured, salted, and canned meat, fish, and poultry. ¨ Ham, reno, hot dogs, and luncheon meats.   ¨ Regular, hard, and processed cheese and regular peanut butter. ¨ Crackers with salted tops, and other salted snack foods such as pretzels, chips, and salted popcorn. ¨ Frozen prepared meals, unless labeled low-sodium. ¨ Canned and dried soups, broths, and bouillon, unless labeled sodium-free or low-sodium. ¨ Canned vegetables, unless labeled sodium-free or low-sodium. ¨ Western Agustina fries, pizza, tacos, and other fast foods. ¨ Pickles, olives, ketchup, and other condiments, especially soy sauce, unless labeled sodium-free or low-sodium. Where can you learn more? Go to https://Boundless Geo.[employer]. org and sign in to your [employer] account. Enter H142 in the [employer] box to learn more about \"Low Sodium Diet (2,000 Milligram): Care Instructions. \"     If you do not have an account, please click on the \"Sign Up Now\" link. Current as of: May 12, 2017  Content Version: 11.6  © 2815-2474 [employer]. Care instructions adapted under license by [facility] ([facility]). If you have questions about a medical condition or this instruction, always ask your healthcare professional. [place] [address] any warranty or liability for your use of this information. Patient Education        DASH Diet: Care Instructions  Your Care Instructions    The DASH diet is an eating plan that can help lower your blood pressure. DASH stands for Dietary Approaches to Stop Hypertension. Hypertension is high blood pressure. The DASH diet focuses on eating foods that are high in calcium, potassium, and magnesium. These nutrients can lower blood pressure. The foods that are highest in these nutrients are fruits, vegetables, low-fat dairy products, nuts, seeds, and legumes. But taking calcium, potassium, and magnesium supplements instead of eating foods that are high in those nutrients does not have the same effect. The DASH diet also includes whole grains, fish, and poultry.   The DASH diet is one of several lifestyle changes your doctor may recommend to lower your high blood pressure. Your doctor may also want you to decrease the amount of sodium in your diet. Lowering sodium while following the DASH diet can lower blood pressure even further than just the DASH diet alone. Follow-up care is a key part of your treatment and safety. Be sure to make and go to all appointments, and call your doctor if you are having problems. It's also a good idea to know your test results and keep a list of the medicines you take. How can you care for yourself at home? Following the DASH diet  · Eat 4 to 5 servings of fruit each day. A serving is 1 medium-sized piece of fruit, ½ cup chopped or canned fruit, 1/4 cup dried fruit, or 4 ounces (½ cup) of fruit juice. Choose fruit more often than fruit juice. · Eat 4 to 5 servings of vegetables each day. A serving is 1 cup of lettuce or raw leafy vegetables, ½ cup of chopped or cooked vegetables, or 4 ounces (½ cup) of vegetable juice. Choose vegetables more often than vegetable juice. · Get 2 to 3 servings of low-fat and fat-free dairy each day. A serving is 8 ounces of milk, 1 cup of yogurt, or 1 ½ ounces of cheese. · Eat 6 to 8 servings of grains each day. A serving is 1 slice of bread, 1 ounce of dry cereal, or ½ cup of cooked rice, pasta, or cooked cereal. Try to choose whole-grain products as much as possible. · Limit lean meat, poultry, and fish to 2 servings each day. A serving is 3 ounces, about the size of a deck of cards. · Eat 4 to 5 servings of nuts, seeds, and legumes (cooked dried beans, lentils, and split peas) each week. A serving is 1/3 cup of nuts, 2 tablespoons of seeds, or ½ cup of cooked beans or peas. · Limit fats and oils to 2 to 3 servings each day. A serving is 1 teaspoon of vegetable oil or 2 tablespoons of salad dressing. · Limit sweets and added sugars to 5 servings or less a week. A serving is 1 tablespoon jelly or jam, ½ cup sorbet, or 1 cup of lemonade.   · Eat less than 2,300 milligrams (mg) of sodium a day. If you limit your sodium to 1,500 mg a day, you can lower your blood pressure even more. Tips for success  · Start small. Do not try to make dramatic changes to your diet all at once. You might feel that you are missing out on your favorite foods and then be more likely to not follow the plan. Make small changes, and stick with them. Once those changes become habit, add a few more changes. · Try some of the following:  ¨ Make it a goal to eat a fruit or vegetable at every meal and at snacks. This will make it easy to get the recommended amount of fruits and vegetables each day. ¨ Try yogurt topped with fruit and nuts for a snack or healthy dessert. ¨ Add lettuce, tomato, cucumber, and onion to sandwiches. ¨ Combine a ready-made pizza crust with low-fat mozzarella cheese and lots of vegetable toppings. Try using tomatoes, squash, spinach, broccoli, carrots, cauliflower, and onions. ¨ Have a variety of cut-up vegetables with a low-fat dip as an appetizer instead of chips and dip. ¨ Sprinkle sunflower seeds or chopped almonds over salads. Or try adding chopped walnuts or almonds to cooked vegetables. ¨ Try some vegetarian meals using beans and peas. Add garbanzo or kidney beans to salads. Make burritos and tacos with mashed stoner beans or black beans. Where can you learn more? Go to https://Phanfaresherine.health[employer]lebotek [employer]. org and sign in to your [employer] account. Enter P522 in the [facility] box to learn more about \"DASH Diet: Care Instructions. \"     If you do not have an account, please click on the \"Sign Up Now\" link. Current as of: December 6, 2017  Content Version: 11.6  © 2663-6954 [employer]. Care instructions adapted under license by [facility] ([facility]). If you have questions about a medical condition or this instruction, always ask your healthcare professional. [place] [address] any warranty or liability for your use of this information.

## 2018-07-24 ENCOUNTER — TELEPHONE (OUTPATIENT)
Dept: OBGYN CLINIC | Age: 38
End: 2018-07-24

## 2018-07-24 NOTE — LETTER
[facility]  [address] [place] [address]  Phone: [phone]  Fax: [phone]     Juani Fuentes DO           July 23, 2018      Patient: Vidal Lee   YOB: 1980   Date of Visit: 7/23/2018         To Whom It May Concern:    Patient, Vidal Lee will be having surgery on 7/27/18.      It is my medical opinion that Vidal Lee is released to return to work 8/3/18 without restrictions.     If you have any questions or concerns, please don't hesitate to call.     Sincerely,           Juani Fuentes DO

## 2018-07-26 ENCOUNTER — ANESTHESIA EVENT (OUTPATIENT)
Dept: OPERATING ROOM | Age: 38
End: 2018-07-26
Payer: COMMERCIAL

## 2018-07-26 NOTE — PROGRESS NOTES
Obstructive Sleep Apnea (ELIANA) Screening     Patient: Sea Stewart    YOB: 1980      Medical Record #:  [id number]                     Date:  7/26/2018     1. Are you a loud and/or regular snorer? []  Yes       [x] No    2. Have you been observed to gasp or stop breathing during sleep? []  Yes       [x] No    3. Do you feel tired or groggy upon awakening or do you awaken with a headache?           []  Yes       [] No    4. Are you often tired or fatigued during the wake time hours? []  Yes       [] No    5. Do you fall asleep sitting, reading, watching TV or driving? []  Yes       [] No    6. Do you often have problems with memory or concentration? []  Yes       [] No    **If patient's score is ? 3 they are considered high risk for ELIANA. Notify the anesthesiologist of the high risk and document in focus note. Note:  If the patient's BMI is more than 35 kg m¯² , has neck circumference > 40 cm, and/or high blood pressure the risk is greater (© American Sleep Apnea Association, 2006).

## 2018-07-27 ENCOUNTER — ANESTHESIA (OUTPATIENT)
Dept: OPERATING ROOM | Age: 38
End: 2018-07-27
Payer: COMMERCIAL

## 2018-07-27 ENCOUNTER — HOSPITAL ENCOUNTER (OUTPATIENT)
Age: 38
Setting detail: OUTPATIENT SURGERY
Discharge: HOME | End: 2018-07-27
Attending: OBSTETRICS & GYNECOLOGY | Admitting: OBSTETRICS & GYNECOLOGY
Payer: COMMERCIAL

## 2018-07-27 VITALS
RESPIRATION RATE: 16 BRPM | WEIGHT: 217 LBS | BODY MASS INDEX: 42.6 KG/M2 | DIASTOLIC BLOOD PRESSURE: 97 MMHG | HEART RATE: 77 BPM | SYSTOLIC BLOOD PRESSURE: 169 MMHG | OXYGEN SATURATION: 99 % | HEIGHT: 60 IN | TEMPERATURE: 97.9 F

## 2018-07-27 VITALS
RESPIRATION RATE: 4 BRPM | OXYGEN SATURATION: 99 % | TEMPERATURE: 97.7 F | DIASTOLIC BLOOD PRESSURE: 42 MMHG | SYSTOLIC BLOOD PRESSURE: 87 MMHG

## 2018-07-27 DIAGNOSIS — Z98.890 S/P CONIZATION OF CERVIX: Primary | ICD-10-CM

## 2018-07-27 LAB
BASOPHILS ABSOLUTE: 0.1 K/UL (ref 0–0.2)
BASOPHILS RELATIVE PERCENT: 1.2 %
EOSINOPHILS ABSOLUTE: 0.2 K/UL (ref 0–0.6)
EOSINOPHILS RELATIVE PERCENT: 2.5 %
HCT VFR BLD CALC: 36.9 % (ref 36–48)
HEMOGLOBIN: 11.9 G/DL (ref 12–16)
LYMPHOCYTES ABSOLUTE: 3.1 K/UL (ref 1–5.1)
LYMPHOCYTES RELATIVE PERCENT: 38.9 %
MCH RBC QN AUTO: 25.8 PG (ref 26–34)
MCHC RBC AUTO-ENTMCNC: 32.4 G/DL (ref 31–36)
MCV RBC AUTO: 79.6 FL (ref 80–100)
MONOCYTES ABSOLUTE: 0.4 K/UL (ref 0–1.3)
MONOCYTES RELATIVE PERCENT: 4.6 %
NEUTROPHILS ABSOLUTE: 4.2 K/UL (ref 1.7–7.7)
NEUTROPHILS RELATIVE PERCENT: 52.8 %
PDW BLD-RTO: 15.3 % (ref 12.4–15.4)
PLATELET # BLD: 440 K/UL (ref 135–450)
PMV BLD AUTO: 7.3 FL (ref 5–10.5)
PREGNANCY, URINE: NEGATIVE
RBC # BLD: 4.64 M/UL (ref 4–5.2)
WBC # BLD: 7.9 K/UL (ref 4–11)

## 2018-07-27 PROCEDURE — 2709999900 HC NON-CHARGEABLE SUPPLY: Performed by: OBSTETRICS & GYNECOLOGY

## 2018-07-27 PROCEDURE — 2500000003 HC RX 250 WO HCPCS: Performed by: OBSTETRICS & GYNECOLOGY

## 2018-07-27 PROCEDURE — 84703 CHORIONIC GONADOTROPIN ASSAY: CPT

## 2018-07-27 PROCEDURE — 7100000010 HC PHASE II RECOVERY - FIRST 15 MIN: Performed by: OBSTETRICS & GYNECOLOGY

## 2018-07-27 PROCEDURE — 3700000000 HC ANESTHESIA ATTENDED CARE: Performed by: OBSTETRICS & GYNECOLOGY

## 2018-07-27 PROCEDURE — 85025 COMPLETE CBC W/AUTO DIFF WBC: CPT

## 2018-07-27 PROCEDURE — 3700000001 HC ADD 15 MINUTES (ANESTHESIA): Performed by: OBSTETRICS & GYNECOLOGY

## 2018-07-27 PROCEDURE — 7100000000 HC PACU RECOVERY - FIRST 15 MIN: Performed by: OBSTETRICS & GYNECOLOGY

## 2018-07-27 PROCEDURE — 6360000002 HC RX W HCPCS: Performed by: OBSTETRICS & GYNECOLOGY

## 2018-07-27 PROCEDURE — 2580000003 HC RX 258: Performed by: ANESTHESIOLOGY

## 2018-07-27 PROCEDURE — 7100000011 HC PHASE II RECOVERY - ADDTL 15 MIN: Performed by: OBSTETRICS & GYNECOLOGY

## 2018-07-27 PROCEDURE — 3600000012 HC SURGERY LEVEL 2 ADDTL 15MIN: Performed by: OBSTETRICS & GYNECOLOGY

## 2018-07-27 PROCEDURE — 6370000000 HC RX 637 (ALT 250 FOR IP): Performed by: OBSTETRICS & GYNECOLOGY

## 2018-07-27 PROCEDURE — 2500000003 HC RX 250 WO HCPCS

## 2018-07-27 PROCEDURE — 3600000002 HC SURGERY LEVEL 2 BASE: Performed by: OBSTETRICS & GYNECOLOGY

## 2018-07-27 PROCEDURE — 88305 TISSUE EXAM BY PATHOLOGIST: CPT

## 2018-07-27 PROCEDURE — 88307 TISSUE EXAM BY PATHOLOGIST: CPT

## 2018-07-27 PROCEDURE — 36415 COLL VENOUS BLD VENIPUNCTURE: CPT

## 2018-07-27 PROCEDURE — 6360000002 HC RX W HCPCS: Performed by: NURSE ANESTHETIST, CERTIFIED REGISTERED

## 2018-07-27 PROCEDURE — 7100000001 HC PACU RECOVERY - ADDTL 15 MIN: Performed by: OBSTETRICS & GYNECOLOGY

## 2018-07-27 PROCEDURE — 57520 CONIZATION OF CERVIX: CPT | Performed by: OBSTETRICS & GYNECOLOGY

## 2018-07-27 PROCEDURE — 2580000003 HC RX 258: Performed by: OBSTETRICS & GYNECOLOGY

## 2018-07-27 RX ORDER — MAGNESIUM HYDROXIDE 1200 MG/15ML
LIQUID ORAL CONTINUOUS PRN
Status: DISCONTINUED | OUTPATIENT
Start: 2018-07-27 | End: 2018-07-27 | Stop reason: HOSPADM

## 2018-07-27 RX ORDER — ONDANSETRON 2 MG/ML
INJECTION INTRAMUSCULAR; INTRAVENOUS PRN
Status: DISCONTINUED | OUTPATIENT
Start: 2018-07-27 | End: 2018-07-27 | Stop reason: SDUPTHER

## 2018-07-27 RX ORDER — ACETIC ACID 0.25 G/100ML
IRRIGANT IRRIGATION PRN
Status: DISCONTINUED | OUTPATIENT
Start: 2018-07-27 | End: 2018-07-27 | Stop reason: HOSPADM

## 2018-07-27 RX ORDER — LIDOCAINE HYDROCHLORIDE 10 MG/ML
0.3 INJECTION, SOLUTION EPIDURAL; INFILTRATION; INTRACAUDAL; PERINEURAL
Status: DISCONTINUED | OUTPATIENT
Start: 2018-07-27 | End: 2018-07-27 | Stop reason: HOSPADM

## 2018-07-27 RX ORDER — LIDOCAINE HYDROCHLORIDE 10 MG/ML
INJECTION, SOLUTION INFILTRATION; PERINEURAL PRN
Status: DISCONTINUED | OUTPATIENT
Start: 2018-07-27 | End: 2018-07-27 | Stop reason: HOSPADM

## 2018-07-27 RX ORDER — SODIUM CHLORIDE 0.9 % (FLUSH) 0.9 %
10 SYRINGE (ML) INJECTION EVERY 12 HOURS SCHEDULED
Status: DISCONTINUED | OUTPATIENT
Start: 2018-07-27 | End: 2018-07-27 | Stop reason: HOSPADM

## 2018-07-27 RX ORDER — SODIUM CHLORIDE, SODIUM LACTATE, POTASSIUM CHLORIDE, CALCIUM CHLORIDE 600; 310; 30; 20 MG/100ML; MG/100ML; MG/100ML; MG/100ML
INJECTION, SOLUTION INTRAVENOUS CONTINUOUS
Status: DISCONTINUED | OUTPATIENT
Start: 2018-07-27 | End: 2018-07-27 | Stop reason: HOSPADM

## 2018-07-27 RX ORDER — IBUPROFEN 800 MG/1
800 TABLET ORAL EVERY 8 HOURS PRN
Qty: 40 TABLET | Refills: 0 | Status: SHIPPED | OUTPATIENT
Start: 2018-07-27 | End: 2019-04-04 | Stop reason: SDUPTHER

## 2018-07-27 RX ORDER — OXYCODONE HYDROCHLORIDE AND ACETAMINOPHEN 5; 325 MG/1; MG/1
2 TABLET ORAL EVERY 8 HOURS PRN
Qty: 28 TABLET | Refills: 0 | Status: SHIPPED | OUTPATIENT
Start: 2018-07-27 | End: 2018-08-03

## 2018-07-27 RX ORDER — SODIUM CHLORIDE 0.9 % (FLUSH) 0.9 %
10 SYRINGE (ML) INJECTION PRN
Status: DISCONTINUED | OUTPATIENT
Start: 2018-07-27 | End: 2018-07-27 | Stop reason: HOSPADM

## 2018-07-27 RX ORDER — DEXAMETHASONE SODIUM PHOSPHATE 10 MG/ML
INJECTION INTRAMUSCULAR; INTRAVENOUS PRN
Status: DISCONTINUED | OUTPATIENT
Start: 2018-07-27 | End: 2018-07-27 | Stop reason: SDUPTHER

## 2018-07-27 RX ORDER — FENTANYL CITRATE 50 UG/ML
INJECTION, SOLUTION INTRAMUSCULAR; INTRAVENOUS PRN
Status: DISCONTINUED | OUTPATIENT
Start: 2018-07-27 | End: 2018-07-27 | Stop reason: SDUPTHER

## 2018-07-27 RX ORDER — MIDAZOLAM HYDROCHLORIDE 1 MG/ML
INJECTION INTRAMUSCULAR; INTRAVENOUS PRN
Status: DISCONTINUED | OUTPATIENT
Start: 2018-07-27 | End: 2018-07-27 | Stop reason: SDUPTHER

## 2018-07-27 RX ADMIN — FENTANYL CITRATE 100 MCG: 50 INJECTION INTRAMUSCULAR; INTRAVENOUS at 11:57

## 2018-07-27 RX ADMIN — ONDANSETRON 4 MG: 2 INJECTION INTRAMUSCULAR; INTRAVENOUS at 11:57

## 2018-07-27 RX ADMIN — MIDAZOLAM HYDROCHLORIDE 2 MG: 2 INJECTION, SOLUTION INTRAMUSCULAR; INTRAVENOUS at 11:57

## 2018-07-27 RX ADMIN — DEXAMETHASONE SODIUM PHOSPHATE 10 MG: 10 INJECTION INTRAMUSCULAR; INTRAVENOUS at 11:57

## 2018-07-27 RX ADMIN — SODIUM CHLORIDE, POTASSIUM CHLORIDE, SODIUM LACTATE AND CALCIUM CHLORIDE: 600; 310; 30; 20 INJECTION, SOLUTION INTRAVENOUS at 11:24

## 2018-07-27 RX ADMIN — FENTANYL CITRATE 25 MCG: 50 INJECTION INTRAMUSCULAR; INTRAVENOUS at 12:18

## 2018-07-27 RX ADMIN — SODIUM CHLORIDE, POTASSIUM CHLORIDE, SODIUM LACTATE AND CALCIUM CHLORIDE: 600; 310; 30; 20 INJECTION, SOLUTION INTRAVENOUS at 11:05

## 2018-07-27 RX ADMIN — Medication 2 G: at 11:50

## 2018-07-27 RX ADMIN — FENTANYL CITRATE 25 MCG: 50 INJECTION INTRAMUSCULAR; INTRAVENOUS at 12:11

## 2018-07-27 ASSESSMENT — PULMONARY FUNCTION TESTS
PIF_VALUE: 3
PIF_VALUE: 3
PIF_VALUE: 2
PIF_VALUE: 4
PIF_VALUE: 11
PIF_VALUE: 15
PIF_VALUE: 11
PIF_VALUE: 3
PIF_VALUE: 11
PIF_VALUE: 2
PIF_VALUE: 11
PIF_VALUE: 3
PIF_VALUE: 3
PIF_VALUE: 11
PIF_VALUE: 3
PIF_VALUE: 1
PIF_VALUE: 3
PIF_VALUE: 3
PIF_VALUE: 20
PIF_VALUE: 2
PIF_VALUE: 3
PIF_VALUE: 11
PIF_VALUE: 15
PIF_VALUE: 4
PIF_VALUE: 0
PIF_VALUE: 3
PIF_VALUE: 2
PIF_VALUE: 11
PIF_VALUE: 3
PIF_VALUE: 12
PIF_VALUE: 11
PIF_VALUE: 4
PIF_VALUE: 11
PIF_VALUE: 4
PIF_VALUE: 2
PIF_VALUE: 3
PIF_VALUE: 12
PIF_VALUE: 11
PIF_VALUE: 3
PIF_VALUE: 3
PIF_VALUE: 4
PIF_VALUE: 11
PIF_VALUE: 3
PIF_VALUE: 16
PIF_VALUE: 3
PIF_VALUE: 4
PIF_VALUE: 3

## 2018-07-27 ASSESSMENT — PAIN - FUNCTIONAL ASSESSMENT: PAIN_FUNCTIONAL_ASSESSMENT: 0-10

## 2018-07-27 ASSESSMENT — PAIN SCALES - GENERAL: PAINLEVEL_OUTOF10: 0

## 2018-07-27 NOTE — H&P
Patient seen and evaluated prior to surgery. Patient reports no changes in medical condition. There have been no changes in the patient's medications or assessment. Preoperative tests and diagnostics have been reviewed. Surgery remains indicated as noted in History and Physical (H&P). Prophylactic antibiotics, use of beta-blockers and VTE prophylaxis have been addressed/ordered as indicated. Please see H&P and orders.

## 2018-07-27 NOTE — ANESTHESIA POSTPROCEDURE EVALUATION
Department of Anesthesiology  Postprocedure Note    Patient: Janet Bradley  MRN: [id number]  YOB: 1980  Date of evaluation: 7/27/2018  Time:  2:18 PM     Procedure Summary     Date:  07/27/18 Room / Location:  [facility] OR 03 / [facility] OR    Anesthesia Start:  1148 Anesthesia Stop:  1253    Procedure:  CERVICAL COLD KNIFE CONIZATION, COLPOSCOPY UNDER ANESTHESIA (N/A ) Diagnosis:  (HIGH GRADE DYSPLASIA)    Surgeon:  Ang Maldonado DO Responsible Provider:  Huong Trammell MD    Anesthesia Type:  general ASA Status:  3          Anesthesia Type: general    Michelle Phase I: Michelle Score: 10    Michelle Phase II: Michelle Score: 10    Last vitals: Reviewed and per EMR flowsheets.        Anesthesia Post Evaluation    Patient location during evaluation: PACU  Level of consciousness: awake  Complications: no  Respiratory status: acceptable

## 2018-07-27 NOTE — ANESTHESIA PRE PROCEDURE
Department of Anesthesiology  Preprocedure Note       Name:  Renee Nunez   Age:  42 y.o.  :  1980                                          MRN:  [id number]         Date:  2018      Surgeon: William Oliva):  Lois Underwood DO    Procedure: Procedure(s):  CERVICAL COLD KNIFE CONIZATION, COLPOSCOPY UNDER ANESTHESIA    Medications prior to admission:   Prior to Admission medications    Medication Sig Start Date End Date Taking?  Authorizing Provider   ibuprofen (ADVIL;MOTRIN) 800 MG tablet Take 1 tablet by mouth every 8 hours as needed for Pain 17   Radhika Scott MD   hydrochlorothiazide (HYDRODIURIL) 25 MG tablet TAKE 1 TABLET BY MOUTH EVERY DAY 7/3/17   Michelle Alvarado MD       Current medications:    Current Facility-Administered Medications   Medication Dose Route Frequency Provider Last Rate Last Dose    lactated ringers infusion   Intravenous Continuous Ramiro Oviedo MD        sodium chloride flush 0.9 % injection 10 mL  10 mL Intravenous 2 times per day Ramiro Oviedo MD        sodium chloride flush 0.9 % injection 10 mL  10 mL Intravenous PRN Ramiro Oviedo MD        lidocaine PF 1 % injection 0.3 mL  0.3 mL Intradermal Once PRN Ramiro Oviedo MD        ceFAZolin (ANCEF) 2 g in sterile water 20 mL IV syringe  2 g Intravenous On Call to Clarita Bolton 912, DO           Allergies:  No Known Allergies    Problem List:    Patient Active Problem List   Diagnosis Code    Hypertension I10    Morbid obesity with BMI of 40.0-44.9, adult [facility] E66.01, Z68.41    Dysplasia of cervix uteri N87.9    Subserous leiomyoma of uterus D25.2       Past Medical History:        Diagnosis Date    Hypertension     Morbid obesity with BMI of 40.0-44.9, adult ([facility] 2018       Past Surgical History:        Procedure Laterality Date    TUBAL LIGATION         Social History:    Social History   Substance Use Topics    Smoking status: Never Smoker    Smokeless tobacco: Never Used  Alcohol use Yes      Comment: 1-2 drinks per month                                Counseling given: Not Answered      Vital Signs (Current):   Vitals:    07/26/18 0910 07/27/18 1031   BP:  (!) 181/112   Pulse:  84   Resp:  20   Temp:  97.6 °F (36.4 °C)   TempSrc:  Temporal   SpO2:  100%   Weight: 217 lb (98.4 kg) 217 lb (98.4 kg)   Height: 5' (1.524 m) 5' (1.524 m)                                              BP Readings from Last 3 Encounters:   07/27/18 (!) 181/112   07/18/18 (!) 142/85   06/26/18 128/72       NPO Status: Time of last liquid consumption: 2330                        Time of last solid consumption: 2330                        Date of last liquid consumption: 07/26/18                        Date of last solid food consumption: 07/26/18    BMI:   Wt Readings from Last 3 Encounters:   07/27/18 217 lb (98.4 kg)   07/18/18 217 lb (98.4 kg)   06/26/18 220 lb 3.2 oz (99.9 kg)     Body mass index is 42.38 kg/m². CBC:   Lab Results   Component Value Date    WBC 8.8 05/21/2018    RBC 4.61 05/21/2018    HGB 12.2 05/21/2018    HCT 36.8 05/21/2018    MCV 79.8 05/21/2018    RDW 15.4 05/21/2018     05/21/2018       CMP:   Lab Results   Component Value Date     07/22/2017    K 3.5 07/22/2017    CL 98 07/22/2017    CO2 26 07/22/2017    BUN 18 07/22/2017    CREATININE 0.9 07/22/2017    GFRAA >60 07/22/2017    GFRAA >60 01/31/2013    AGRATIO 0.9 07/22/2017    LABGLOM >60 07/22/2017    GLUCOSE 86 05/25/2018    PROT 7.4 07/22/2017    PROT 8.4 01/31/2013    CALCIUM 8.6 07/22/2017    BILITOT <0.2 07/22/2017    ALKPHOS 71 07/22/2017    AST 12 07/22/2017    ALT 15 07/22/2017       POC Tests: No results for input(s): POCGLU, POCNA, POCK, POCCL, POCBUN, POCHEMO, POCHCT in the last 72 hours.     Coags: No results found for: PROTIME, INR, APTT    HCG (If Applicable):   Lab Results   Component Value Date    PREGTESTUR negative 02/21/2018        ABGs: No results found for: PHART, PO2ART, AAH0TBX, TAZ2QYW, BEART, K1GVFKWY     Type & Screen (If Applicable):  No results found for: [facility]    Anesthesia Evaluation  Patient summary reviewed and Nursing notes reviewed no history of anesthetic complications:   Airway: Mallampati: III     Neck ROM: full   Dental:          Pulmonary:                              Cardiovascular:    (+) hypertension:,                   Neuro/Psych:               GI/Hepatic/Renal:   (+) morbid obesity (Grade 3 obesity)          Endo/Other:                     Abdominal:           Vascular:                                        Anesthesia Plan      general     ASA 3     (Medications & allergies reviewed  All available lab & EKG data reviewed)  Induction: intravenous. Anesthetic plan and risks discussed with patient. Plan discussed with CRNA.                 Angel Hammonds MD   7/27/2018

## 2018-07-28 NOTE — OP NOTE
[facility]                   Nick Catherine                                 OPERATIVE REPORT    PATIENT NAME: Hanny Cary                :        1980  MED REC NO:   [id number]                          ROOM:  ACCOUNT NO:   [de-identified]                           ADMIT DATE: 2018  PROVIDER:     Gurinder Amaro DO    DATE OF PROCEDURE:  2018    PREOPERATIVE DIAGNOSES:  1. High-grade intraepithelial cervical dysplasia or lesion. 2.  RAFFY 2, RAFFY 3.  3.  Uterine fibroids. 4.  Irregular menses. POSTOPERATIVE DIAGNOSES:  1. High-grade intraepithelial cervical dysplasia or lesion. 2.  RAFFY 2, RAFFY 3.  3.  Uterine fibroids. 4.  Irregular menses. OPERATION PERFORMED:  1. Colposcopy. 2.  Cervical conization. 3.  ECC. 4.  Endometrial biopsy. SURGEON:  Gurinder Amaro DO    ANESTHESIA:  General LMA anesthetic. FINDINGS:  Enlarged cervix and colposcopic findings significant with  high-grade dysplasia. Specimen is marked at the 12 o'clock position with  silk suture. IV FLUIDS:  300 mL of crystalloid. URINE OUTPUT:  25 mL straight cath prior to procedure. ESTIMATED BLOOD LOSS:  Less than 50 mL. SPECIMENS:  1. Cervical cone. 2.  Endocervical curettage. 3.  Endometrial curettings. COMPLICATIONS:  None. CONDITION:  Stable. DISPOSITION:  PACU. INDICATIONS:  The patient is a 73-year-old  5, para 4-0-1-4 female,  who presents for definitive therapy and further evaluation secondary to  colposcopic findings of high-grade squamous intraepithelial lesion. She  has had a colposcopy performed on 2018 secondary to low-grade  squamous intraepithelial lesion as well as high-risk positive HPV other  types on Pap smear. She has had a previous abnormal Pap smear that had  been abnormal in  indicating ASCUS, cannot exclude high-grade and  low-grade.   No followup had been performed and ultimately the patient  follow up in 05/2018. The patient has also described some irregular  bleeding. She experiences irregular spotting five days prior to her  menstrual cycle. This has been going on since 2008. An ultrasound has  indicated the presence of large uterine fibroid measuring 6 x 6 x 6 cm. The patient does not desire future childbearing. Risks and benefits of  colposcopy and cervical conization were reviewed. Written informed consent  was obtained and the patient was taken to the OR on 07/27/2018. OPERATIVE PROCEDURE:  The patient was taken to the OR, placed under general  LMA anesthetic and positioned in the dorsal lithotomy position. Time-out  was performed and a colposcopy was then performed. The external genitalia  were examined and noted to be essentially normal.  The speculum was then  placed. Cervix and upper vagina were then evaluated. Using the colposcope  with both white and green light, the cervix was then evaluated. The  squamocolumnar junction was well visualized and colposcopy was found to be  adequate. The cervix was noted to have acetowhite changes at the 12  o'clock, 3 o'clock as well as 6 o'clock positions with some mild extension  laterally. Upon evaluating the cervix, the patient was then prepped and  draped in the usual fashion. Upon prepping and draping the patient, a  further time-out was performed. At that point, a speculum was then placed  in the patient's vagina. The anterior lip of the cervix was then grasped  using a single-tooth tenaculum. The cervix was anesthetized with 1%  lidocaine in a circumferential fashion using 10 mL lidocaine. Sutures were  then placed at 3 o'clock and 6 o'clock using 0 Vicryl. The cervical conization was then performed with attention to the notable  areas on colposcopy. ASCUS conization was performed and the tissue was  removed.   Cauterization was performed upon completion of the conization  after performing an ECC with

## 2018-07-29 LAB
ANAEROBIC CULTURE: NORMAL
WOUND/ABSCESS: NORMAL

## 2018-07-30 ENCOUNTER — TELEPHONE (OUTPATIENT)
Dept: OBGYN CLINIC | Age: 38
End: 2018-07-30

## 2018-08-10 ENCOUNTER — TELEPHONE (OUTPATIENT)
Dept: OBGYN CLINIC | Age: 38
End: 2018-08-10

## 2018-08-20 ENCOUNTER — OFFICE VISIT (OUTPATIENT)
Dept: OBGYN CLINIC | Age: 38
End: 2018-08-20

## 2018-08-20 VITALS
DIASTOLIC BLOOD PRESSURE: 68 MMHG | WEIGHT: 215.8 LBS | BODY MASS INDEX: 42.15 KG/M2 | TEMPERATURE: 97.7 F | SYSTOLIC BLOOD PRESSURE: 134 MMHG | HEART RATE: 70 BPM

## 2018-08-20 DIAGNOSIS — B96.89 BACTERIAL VAGINOSIS: ICD-10-CM

## 2018-08-20 DIAGNOSIS — N89.8 VAGINAL ODOR: ICD-10-CM

## 2018-08-20 DIAGNOSIS — N76.0 BACTERIAL VAGINOSIS: ICD-10-CM

## 2018-08-20 DIAGNOSIS — E66.01 MORBID OBESITY WITH BMI OF 40.0-44.9, ADULT (HCC): ICD-10-CM

## 2018-08-20 DIAGNOSIS — D25.2 SUBSEROUS LEIOMYOMA OF UTERUS: ICD-10-CM

## 2018-08-20 DIAGNOSIS — N87.9 DYSPLASIA OF CERVIX UTERI: ICD-10-CM

## 2018-08-20 DIAGNOSIS — Z48.89 ENCOUNTER FOR POSTOPERATIVE CARE: Primary | ICD-10-CM

## 2018-08-20 DIAGNOSIS — Z98.890 S/P CONIZATION OF CERVIX: ICD-10-CM

## 2018-08-20 PROCEDURE — 99024 POSTOP FOLLOW-UP VISIT: CPT | Performed by: OBSTETRICS & GYNECOLOGY

## 2018-08-20 RX ORDER — METRONIDAZOLE 500 MG/1
500 TABLET ORAL 2 TIMES DAILY
Qty: 14 TABLET | Refills: 0 | Status: SHIPPED | OUTPATIENT
Start: 2018-08-20 | End: 2018-08-27

## 2018-08-22 ASSESSMENT — ENCOUNTER SYMPTOMS
CONSTIPATION: 0
VOMITING: 0
NAUSEA: 0
DIARRHEA: 0
ABDOMINAL DISTENTION: 0
SHORTNESS OF BREATH: 0
ABDOMINAL PAIN: 0

## 2018-08-23 NOTE — PROGRESS NOTES
Subjective:      Patient ID: Janet Bradley is a 42 y.o. female. HPI  43 y/o [id number] female presents for postop visit. Patient is 3 weeks s/p colposcopy, cervical conization, ECC and endometrial biopsy secondary to biopsy proven CIN3/HGSIL and CIN2/HGSIL. Patient experienced menstrual cycle postoperatively. Patient notes continued bleeding with episodes of heavy bleeding. Bleeding has improved. Admits to some discharge and odor as well. Patient states she have been sexually active since procedure. States partner has been very pushy to resume sexual activity. Menarche occurred age 3. Menses generally occur every month x 5 days, medium flow, 5 pads per day, cramps. Has noted irregular spotting since 2008--spotting primarily occurs 5 days prior to menses. Sexually active, no problems, monogamous x 11 months. Coitarche occurred age 13. Diagnosed with PID at age 13.  < 22 lifetime partners. Monogamous from age [age]. No family history of ovarian or breast cancer. Family history significant for cervical cancer in sister requiring hysterectomy. Review of Systems   Constitutional: Negative. Negative for activity change, appetite change, chills, fatigue, fever and unexpected weight change. Respiratory: Negative for shortness of breath. Cardiovascular: Negative for chest pain. Gastrointestinal: Negative for abdominal distention, abdominal pain, constipation, diarrhea, nausea and vomiting. Genitourinary: Positive for menstrual problem, vaginal bleeding and vaginal discharge. Negative for difficulty urinating, dysuria, hematuria and pelvic pain. Psychiatric/Behavioral: Negative. Objective:   Physical Exam   Constitutional: She is oriented to person, place, and time. She appears well-developed and well-nourished. No distress. Genitourinary: No labial fusion. There is no rash, tenderness, lesion or injury on the right labia.  There is no rash, tenderness, lesion or injury on the left labia. Cervix exhibits friability. Cervix exhibits no motion tenderness and no discharge. No erythema or tenderness in the vagina. No foreign body in the vagina. No signs of injury around the vagina. Vaginal discharge found. Neurological: She is alert and oriented to person, place, and time. Skin: Skin is warm and dry. She is not diaphoretic. Psychiatric: She has a normal mood and affect. Her behavior is normal. Judgment and thought content normal.   Nursing note and vitals reviewed. 2018  3:52 PM - Hue Roche Incoming Lab Results From Soft (Epic Adt)     Narrative     [address] [place] [address]  [address]                                       Fax [phone]   [phone]  Department of Pathology  FINAL SURGICAL PATHOLOGY REPORT  Patient Name: Pola Rodriguez        Accession No:  [id number]   Age Sex:   1980    37 Y / F      Location:      [facility]  Account No:   [de-identified]                 Collected:     2018  Med Rec No:    [id number]                Received:      2018  Attend Phys:   Yonathan Keating DO        Completed:     2018  Perform Phys: Yonathan Keating DO          Technical processing at [facility], [address] [place] [address]., [place] [address]  Phone [phone]    FINAL DIAGNOSIS:       A. Cervix, LEEP conization:       - Low-grade squamous intraepithelial neoplasia (LSIL/RAFFY-1)         involving the cervical transformation zone - see comment.       - LSIL extends to the ectocervical resection margin.       - Endocervical resection margin negative for dysplasia.       B. Endocervix, curettage:       - Proliferative endometrium; negative for hyperplasia, atypia or         malignancy.      - Scant detached strips of endocervical mucosa; negative for         dysplasia.       C.  Endometrium, Brian Banegas yamels [place] [address] at [facility], [address] [facility]  Phone [phone]        Nava Daigle M.D.  (Electronic Signature)  07/31/2018                                                                     Page 1 of 1   Lab and Collection       Assessment:       Diagnosis Orders   1. Encounter for postoperative care     2. S/P conization of cervix     3. Subserous leiomyoma of uterus     4. Dysplasia of cervix uteri     5. Morbid obesity with BMI of 40.0-44.9, adult ([facility] [address]     6. Bacterial vaginosis  Wet prep, genital   7.  Vaginal odor  Wet prep, genital           Plan:      Orders Placed This Encounter   Procedures    Wet prep, genital     Rx Flagyl  Pelvic rest! X 2 weeks  Follow up prn and 3 months        Nabeel Lopez DO

## 2018-10-05 RX ORDER — HYDROCHLOROTHIAZIDE 25 MG/1
TABLET ORAL
Qty: 330 TABLET | Refills: 0 | Status: SHIPPED | OUTPATIENT
Start: 2018-10-05 | End: 2020-03-12 | Stop reason: SDUPTHER

## 2018-12-19 PROBLEM — E78.5 HYPERLIPIDEMIA: Status: ACTIVE | Noted: 2018-12-19

## 2018-12-20 ENCOUNTER — OFFICE VISIT (OUTPATIENT)
Dept: FAMILY MEDICINE CLINIC | Age: 38
End: 2018-12-20
Payer: COMMERCIAL

## 2018-12-20 VITALS
SYSTOLIC BLOOD PRESSURE: 139 MMHG | DIASTOLIC BLOOD PRESSURE: 90 MMHG | OXYGEN SATURATION: 99 % | HEART RATE: 87 BPM | BODY MASS INDEX: 41.23 KG/M2 | HEIGHT: 60 IN | RESPIRATION RATE: 16 BRPM | WEIGHT: 210 LBS

## 2018-12-20 DIAGNOSIS — Z12.31 SCREENING MAMMOGRAM, ENCOUNTER FOR: ICD-10-CM

## 2018-12-20 DIAGNOSIS — Z98.890 S/P CONIZATION OF CERVIX: ICD-10-CM

## 2018-12-20 DIAGNOSIS — M79.672 LEFT FOOT PAIN: ICD-10-CM

## 2018-12-20 DIAGNOSIS — R71.8 MICROCYTOSIS: ICD-10-CM

## 2018-12-20 DIAGNOSIS — N92.0 MENORRHAGIA WITH REGULAR CYCLE: ICD-10-CM

## 2018-12-20 DIAGNOSIS — I10 HYPERTENSION, UNSPECIFIED TYPE: Primary | ICD-10-CM

## 2018-12-20 DIAGNOSIS — I78.1 SPIDER VEIN, SYMPTOMATIC: ICD-10-CM

## 2018-12-20 DIAGNOSIS — E78.2 MIXED HYPERLIPIDEMIA: ICD-10-CM

## 2018-12-20 DIAGNOSIS — H05.20 PROPTOSIS: ICD-10-CM

## 2018-12-20 DIAGNOSIS — Z80.0 FH: PANCREATIC CANCER: ICD-10-CM

## 2018-12-20 LAB
ANION GAP SERPL CALCULATED.3IONS-SCNC: 19 MMOL/L (ref 3–16)
BUN BLDV-MCNC: 10 MG/DL (ref 7–20)
C-REACTIVE PROTEIN: 22.3 MG/L (ref 0–5.1)
CALCIUM SERPL-MCNC: 9.1 MG/DL (ref 8.3–10.6)
CHLORIDE BLD-SCNC: 102 MMOL/L (ref 99–110)
CHOLESTEROL, TOTAL: 192 MG/DL (ref 0–199)
CO2: 21 MMOL/L (ref 21–32)
CREAT SERPL-MCNC: 0.8 MG/DL (ref 0.6–1.1)
GFR AFRICAN AMERICAN: >60
GFR NON-AFRICAN AMERICAN: >60
GLUCOSE BLD-MCNC: 65 MG/DL (ref 70–99)
HDLC SERPL-MCNC: 36 MG/DL (ref 40–60)
IRON SATURATION: 9 % (ref 15–50)
IRON: 33 UG/DL (ref 37–145)
LDL CHOLESTEROL CALCULATED: 128 MG/DL
POTASSIUM SERPL-SCNC: 4.9 MMOL/L (ref 3.5–5.1)
SODIUM BLD-SCNC: 142 MMOL/L (ref 136–145)
TOTAL IRON BINDING CAPACITY: 348 UG/DL (ref 260–445)
TRIGL SERPL-MCNC: 140 MG/DL (ref 0–150)
VLDLC SERPL CALC-MCNC: 28 MG/DL

## 2018-12-20 PROCEDURE — 36415 COLL VENOUS BLD VENIPUNCTURE: CPT | Performed by: INTERNAL MEDICINE

## 2018-12-20 PROCEDURE — 99214 OFFICE O/P EST MOD 30 MIN: CPT | Performed by: INTERNAL MEDICINE

## 2018-12-20 NOTE — PATIENT INSTRUCTIONS
loss goals. ? A dietitian can help you make healthy changes in your diet. ? An exercise specialist or  can help you develop a safe and effective exercise program.  ? A counselor or psychiatrist can help you cope with issues such as depression, anxiety, or family problems that can make it hard to focus on weight loss. · Consider joining a support group for people who are trying to lose weight. Your doctor can suggest groups in your area. Where can you learn more? Go to https://FriendsterpeZettics.Becual. org and sign in to your [employer] account. Enter Z731 in the Tweetworks box to learn more about \"Starting a Weight Loss Plan: Care Instructions. \"     If you do not have an account, please click on the \"Sign Up Now\" link. Current as of: June 26, 2018  Content Version: 11.8  © 0944-2926 [employer]. Care instructions adapted under license by [facility] ([facility]). If you have questions about a medical condition or this instruction, always ask your healthcare professional. [place] [address] any warranty or liability for your use of this information. Patient Education        Well Visit, Ages 25 to 48: Care Instructions  Your Care Instructions    Physical exams can help you stay healthy. Your doctor has checked your overall health and may have suggested ways to take good care of yourself. He or she also may have recommended tests. At home, you can help prevent illness with healthy eating, regular exercise, and other steps. Follow-up care is a key part of your treatment and safety. Be sure to make and go to all appointments, and call your doctor if you are having problems. It's also a good idea to know your test results and keep a list of the medicines you take. How can you care for yourself at home? · Reach and stay at a healthy weight. This will lower your risk for many problems, such as obesity, diabetes, heart disease, and high blood pressure.   · Get at least 30 minutes of physical activity on most days of the week. Walking is a good choice. You also may want to do other activities, such as running, swimming, cycling, or playing tennis or team sports. Discuss any changes in your exercise program with your doctor. · Do not smoke or allow others to smoke around you. If you need help quitting, talk to your doctor about stop-smoking programs and medicines. These can increase your chances of quitting for good. · Talk to your doctor about whether you have any risk factors for sexually transmitted infections (STIs). Having one sex partner (who does not have STIs and does not have sex with anyone else) is a good way to avoid these infections. · Use birth control if you do not want to have children at this time. Talk with your doctor about the choices available and what might be best for you. · Protect your skin from too much sun. When you're outdoors from 10 a.m. to 4 p.m., stay in the shade or cover up with clothing and a hat with a wide brim. Wear sunglasses that block UV rays. Even when it's cloudy, put broad-spectrum sunscreen (SPF 30 or higher) on any exposed skin. · See a dentist one or two times a year for checkups and to have your teeth cleaned. · Wear a seat belt in the car. · Drink alcohol in moderation, if at all. That means no more than 2 drinks a day for men and 1 drink a day for women. Follow your doctor's advice about when to have certain tests. These tests can spot problems early. For everyone  · Cholesterol. Have the fat (cholesterol) in your blood tested after age 21. Your doctor will tell you how often to have this done based on your age, family history, or other things that can increase your risk for heart disease. · Blood pressure. Have your blood pressure checked during a routine doctor visit. Your doctor will tell you how often to check your blood pressure based on your age, your blood pressure results, and other factors. · Vision.  Talk

## 2018-12-20 NOTE — PROGRESS NOTES
Allergies    Outpatient Prescriptions Marked as Taking for the 12/20/18 encounter (Office Visit) with Shiraz Epstein MD   Medication Sig Dispense Refill    hydrochlorothiazide (HYDRODIURIL) 25 MG tablet TAKE ONE TABLET BY MOUTH ONE TIME A  tablet 0             Past Medical History:   Diagnosis Date    Hyperlipidemia 12/19/2018    Hyperlipidemia 12/19/2018    Hypertension     Morbid obesity with BMI of 40.0-44.9, adult ([facility] [address] 5/28/2018       Past Surgical History:   Procedure Laterality Date    SC COLPOSCOPY,ENTIRE VAGINA N/A 7/27/2018    CERVICAL COLD KNIFE CONIZATION, COLPOSCOPY UNDER ANESTHESIA performed by Corrie Barfield DO at [place] [address]  2001         Social History     Social History    Marital status: Single     Spouse name: N/A    Number of children: N/A    Years of education: N/A     Occupational History    Not on file. Social History Main Topics    Smoking status: Never Smoker    Smokeless tobacco: Never Used    Alcohol use Yes      Comment: 1-2 drinks per month    Drug use: No    Sexual activity: Yes     Partners: Male      Comment: tubal     Other Topics Concern    Not on file     Social History Narrative    No narrative on file       Family History   Problem Relation Age of Onset    Diabetes Mother     High Blood Pressure Mother     Cancer Father         pancreatic           Review of Systems    Objective     BP (!) 152/88   Pulse 87   Resp 16   Ht 5' (1.524 m)   Wt 210 lb (95.3 kg)   SpO2 99% Comment: RA  BMI 41.01 kg/m²     @LASTSAO2(3)@    Wt Readings from Last 3 Encounters:   12/20/18 210 lb (95.3 kg)   08/20/18 215 lb 12.8 oz (97.9 kg)   07/27/18 217 lb (98.4 kg)       Physical Exam     NAD alert and cooperative  HEENT: Nasal salute. TMs unremarkable. Non-crowded hypopharynx. No anterior cervical adenopathy. Positive proptosis. Lungs are clear. Good LAITH ratio without any wheezes rales or rhonchi.   Cardiovascular exam regular rate and rhythm without any murmur click  Abdomen obese. No point tenderness. No hepatosplenomegaly or ascites. Good range of motion to hips. Minimal crepitus of the knees. Good pulses lower extremities. No pain on palpation of the feet. No synovial swelling. Spider veins in few varicose veins. No cords. Chemistry        Component Value Date/Time     07/22/2017 0300    K 3.5 07/22/2017 0300    CL 98 (L) 07/22/2017 0300    CO2 26 07/22/2017 0300    BUN 18 07/22/2017 0300    CREATININE 0.9 07/22/2017 0300        Component Value Date/Time    CALCIUM 8.6 07/22/2017 0300    ALKPHOS 71 07/22/2017 0300    AST 12 (L) 07/22/2017 0300    ALT 15 07/22/2017 0300    BILITOT <0.2 07/22/2017 0300            Lab Results   Component Value Date    WBC 7.9 07/27/2018    HGB 11.9 (L) 07/27/2018    HCT 36.9 07/27/2018    MCV 79.6 (L) 07/27/2018     07/27/2018     No results found for: LABA1C  No results found for: EAG  No results found for: LABA1C  No components found for: CHLPL  Lab Results   Component Value Date    TRIG 71 05/25/2018    TRIG 117 07/11/2017    TRIG 84 05/25/2017     Lab Results   Component Value Date    HDL 44 05/25/2018    HDL 47 07/11/2017    HDL 42 05/25/2017     Lab Results   Component Value Date    LDLCALC 141 (H) 05/25/2018    LDLCALC 136 (H) 07/11/2017    LDLCALC 122 (H) 05/25/2017     Lab Results   Component Value Date    LABVLDL 23 07/11/2017    LABVLDL 13 11/21/2011       Old labs and records reviewed or requested  Discussed past lab and studies with patient      Diagnosis Orders   1. Hypertension, unspecified type  Basic Metabolic Panel   2. Mixed hyperlipidemia  Lipid Panel   3. BMI 40.0-44.9, adult (HCC)  Hemoglobin A1C   4. Left foot pain  C-REACTIVE PROTEIN   5. Proptosis     6. S/P conization of cervix     7. Menorrhagia with regular cycle     8. Microcytosis  Iron and TIBC   9. FH: pancreatic cancer     10.  Screening mammogram, encounter for  SUSIE DIGITAL SCREEN W CAD BILATERAL   11. Spider

## 2018-12-21 LAB
ESTIMATED AVERAGE GLUCOSE: 114 MG/DL
HBA1C MFR BLD: 5.6 %

## 2019-03-29 ENCOUNTER — OFFICE VISIT (OUTPATIENT)
Dept: OBGYN CLINIC | Age: 39
End: 2019-03-29
Payer: COMMERCIAL

## 2019-03-29 VITALS
BODY MASS INDEX: 41.4 KG/M2 | DIASTOLIC BLOOD PRESSURE: 98 MMHG | SYSTOLIC BLOOD PRESSURE: 174 MMHG | HEART RATE: 82 BPM | WEIGHT: 212 LBS

## 2019-03-29 DIAGNOSIS — D25.2 SUBSEROUS LEIOMYOMA OF UTERUS: ICD-10-CM

## 2019-03-29 DIAGNOSIS — N92.0 MENORRHAGIA WITH REGULAR CYCLE: ICD-10-CM

## 2019-03-29 DIAGNOSIS — N87.9 DYSPLASIA OF CERVIX UTERI: ICD-10-CM

## 2019-03-29 DIAGNOSIS — N87.9 CERVICAL DYSPLASIA: ICD-10-CM

## 2019-03-29 DIAGNOSIS — Z98.890 S/P CONIZATION OF CERVIX: ICD-10-CM

## 2019-03-29 DIAGNOSIS — I10 HYPERTENSION, UNSPECIFIED TYPE: ICD-10-CM

## 2019-03-29 DIAGNOSIS — E66.01 MORBID OBESITY WITH BMI OF 40.0-44.9, ADULT (HCC): ICD-10-CM

## 2019-03-29 DIAGNOSIS — Z01.42 SURVEILLANCE PAP FOLLOWING ABNORMAL VAGINAL PAP: Primary | ICD-10-CM

## 2019-03-29 DIAGNOSIS — R53.83 FATIGUE, UNSPECIFIED TYPE: ICD-10-CM

## 2019-03-29 DIAGNOSIS — N94.6 DYSMENORRHEA: ICD-10-CM

## 2019-03-29 PROCEDURE — 99213 OFFICE O/P EST LOW 20 MIN: CPT | Performed by: OBSTETRICS & GYNECOLOGY

## 2019-03-29 NOTE — LETTER
[facility] SCHEDULING [facility]KSHEET    Patient Name: Trista Quintanilla  Patient : 1980  Patient Sex: female  Patient SSN:   Patient Address: [address] [place] [address]  Patient Home Phone: [phone] (home)  Cell:   Telephone Information:   Mobile [phone]     Patient Insurance: Payor: [employer] / Plan: [address]all  EMP / Product Type: *No Product type* /   Insurance I.D. #: [id number]  Authorization #: [id number]  PCP: Stacy Jeffries MD     Patient Type:  Outpatient with bed available  Anesthesia Type:  general  Surgeon:  Dr. Jessica Robledo  Procedure: DaVinci robotic total laparoscopic hysterectomy with bilateral salpingectomy, possible bilateral oophorectomy  CPT Code(s): 03998  PRE OP Diagnosis:  uterine fibroid, abnormal uterine bleeding, cervical dysplasia  ICD-10 Code(s): D25.2, N93.9, N87.9    Surgery Date:  2019  Surgery Time:  7:30 AM   OR Time Needed:  3 hours  Surgery Location:  [facility]    Allergies: Allergies   Allergen Reactions    Ace Inhibitors      cough     Latex Sensitive? no    Who will do History and Physical?  PCP  Cardiac Clearance Needed?  no  Does patient have pacemaker or implanted cardiac defibrillator? no  Special Equipment:  [address] Name: Savanna Underwood 3/29/2019  FAX Number: [phone]        PRE-SURGICAL PHYSICIAN ORDERS    Height:   Ht Readings from Last 1 Encounters:   18 5' (1.524 m)     Weight:   Wt Readings from Last 1 Encounters:   19 212 lb (96.2 kg)       Pre-surgery testing orders:     *Pre-surgical Anesthesia Orders per Anesthesiologist  CBC and BMP Urine HCG  *Knee Antithrombic Compression Wraps    Past Medical History:   1. Cold/Chronic Cough/Tuberculosis  No  2. Bronchitis/COPD  No  3. Asthma/SOB  Yes as child  4. Rheumatic Fever/Heart Murmur  No  5.  High Blood Pressure/Low Blood Pressure  Yes PRE-SURGICAL PHYSICIAN ORDERS:  GYNECOLOGY SURGERY    Patient Name __Sea Steinberg_____    _1980__    Surgical Procedure___DaVinci robotic total laparoscopic hysterectomy with bilateral salpingectomy, possible bilateral oophorectomy_________________________________________________________      Date of Surgery__2019____________             ? Admit as Inpatient    X Outpatient with bed available    Height_5'_ Weight_212 lb__Allergies____Ace inhibitors__________________________    Pre-surgery Testing Orders:  ? Pre-surgical Anesthesia Orders Per Anesthesiologist ? Type & Screen ? RH ABO ? CBC ? Chem 7   ? Serum HCG quantitative ? Serum HCG qualitative ? CXR _____ Reason ? EKG _____reason                           ? Urine Pregnancy on Day of Surgery for all local anesthesia patients ? Other:     Preop Antibiotic Prophylaxis/Hysterectomy/Lap assisted Hysterectomy/Vaginal Hysterectomy-Day of Surgery     Cefazolin IVPB per weight base protocol  ? Cefazolin 2 grams if <119.9kg  ? Cefazolin 3 grams if ?120kg (?264 lbs.)    ALTERNATIVE:     (Consider for PCN/Cephalosporin allergic pts)                                                                                                                      Metronidazole 500 mg IVPB x 1 and Levofloxacin 500 mg IVPB x1      Metronidazole 500 mg IVPB x 1 and Gentamicin 1.5 mg/kg IVPB x 1                                                                                                                                    Pre-op Antibiotics Prophylaxis: Pubovaginal Sling-Day of Surgery     Cefazolin IVPB per weight base protocol  ? Cefazolin 2 grams if <119.9kg  ?  Cefazolin 3 grams if ?120kg (?264 lbs.)      Ampicillin/Sulbactam 3g IVPB       ALTERNATIVE:    Levofloxacin 500 mg IVPB     ALTERNATIVE: (Consider for PCN/Cephalosporin allergic pts)        Clindamycin 900 mg IVPB + Gentamicin 1.5 mg/kg IVPB x 1 ______________________________________________________________________  ______________________________________________________________________  ______________________________________________________________________    Allergies:  ______________________________________________________________________  Physical Exam:    Vital Signs:   BP______P______T______Resp_____Ht______Wt______BMI_______    General:  HEENT:  Neck:                                                                                             Lymph:  CV:                                                                                                Skin:  Pulmonary:                                                                                   GYN:  Abdomen:                                                                                     Extremities:  Neuro:        Patient Name: _[facility]_ : ______1980______      Impression:                    Plan:                      Perioperative Beta-Blocker therapy should be initiated in at-risk patients undergoing major abdominal or vascular procedures, joint replacement, or major chest procedures. At-risk patients are those with established CAD, DM, or have two or more risk factors  age >71, smoker, HTN or hyperlipidemia. Recommendations:  ? If on Beta-Blocker already, continue current drug and titrate to target  ? If not currently on Beta-Blocker, recommend Atenolol or Metoprolol and titrate to target (target systolic BP <898, Pulse <18)  ? Please begin therapy prior to surgery and titrate to goal  ? Beta-Blocker should be continued for at least 30 days post-operatively as tolerated  ? Prescription and therapy instituted  ?  The patient would benefit from Beta-Blocker therapy, but their use is contraindicated (i.e. reactive airway disease exacerbated by Beta-Blockers, 2nd or 3rd degree heart block, severe aortic stenosis, other______________________________) ? The patient is to continue their current Beta-Blocker therapy        Physician Signature: ________________________________ Date: ___________

## 2019-03-29 NOTE — Clinical Note
PA obtained Ref #[id number]  Patient wanting surgery date for 8/2019    20% pre-payment = $486.60

## 2019-04-02 ENCOUNTER — TELEPHONE (OUTPATIENT)
Dept: OBGYN CLINIC | Age: 39
End: 2019-04-02

## 2019-04-02 DIAGNOSIS — Z98.890 S/P CONIZATION OF CERVIX: ICD-10-CM

## 2019-04-02 NOTE — TELEPHONE ENCOUNTER
Called patient to offer surgery date. Patient wanting to verify with physician if she has to have a hysterectomy or if the fibroid can just be removed. Patient states her hopeful goal to having surgery was to avoid the symptoms she has prior to menses like breast tenderness and body aches. States she was told her ovaries would not be taken and most likely would continue to have pre menstrual symptoms until she goes thru menopause on her own. Patient would like physician to advise. If recommended to follow thru with hysterectomy would like to postpone scheduling until 9/2019 since her daughter will be away at college.

## 2019-04-04 PROBLEM — N94.6 DYSMENORRHEA: Status: ACTIVE | Noted: 2019-04-04

## 2019-04-04 PROBLEM — N92.0 MENORRHAGIA WITH REGULAR CYCLE: Status: ACTIVE | Noted: 2019-04-04

## 2019-04-04 RX ORDER — IBUPROFEN 800 MG/1
800 TABLET ORAL EVERY 8 HOURS PRN
Qty: 40 TABLET | Refills: 0 | Status: ON HOLD | OUTPATIENT
Start: 2019-04-04 | End: 2019-08-30 | Stop reason: SDUPTHER

## 2019-04-04 ASSESSMENT — ENCOUNTER SYMPTOMS
DIARRHEA: 0
ABDOMINAL PAIN: 0
NAUSEA: 0
SHORTNESS OF BREATH: 0
ABDOMINAL DISTENTION: 0
VOMITING: 0
CONSTIPATION: 0

## 2019-04-04 NOTE — TELEPHONE ENCOUNTER
Pt is worried about the pain she is having and no longer want to wait until September for surgery she asked if Bel could call her when she gets back into office

## 2019-04-04 NOTE — PROGRESS NOTES
Subjective:      Patient ID: Howard Dela Cruz is a 34 y.o. female. HPI  35 y/o  female presents for follow up pap smear. Patient is 6 months s/p colposcopy, cervical conization, ECC and endometrial biopsy secondary to biopsy proven CIN3/HGSIL and CIN2/HGSIL. Menarche occurred age 3. Menstrual cycles have worsened over time. Menses occur every month x 5 days, heavy flow, now 10 (previously 5) pads per day, severe cramps and pain. Recently noted increase in fatigue with menstrual cycles. Patient with known uterine fibroid. Sexually active, no problems, monogamous x 1.5 years. Coitarche occurred age 13. Diagnosed with PID at age 13.  < 22 lifetime partners. Monogamous from age [age]. No family history of ovarian or breast cancer. Family history significant for uterine cancer in sister requiring hysterectomy. Review of Systems   Constitutional: Positive for fatigue. Negative for activity change, appetite change, chills, fever and unexpected weight change. Respiratory: Negative for shortness of breath. Cardiovascular: Negative for chest pain. Gastrointestinal: Negative for abdominal distention, abdominal pain, constipation, diarrhea, nausea and vomiting. Endocrine: Negative for cold intolerance and heat intolerance. Genitourinary: Positive for menstrual problem and pelvic pain. Negative for difficulty urinating, dysuria, hematuria, vaginal bleeding, vaginal discharge and vaginal pain. Psychiatric/Behavioral: Negative. Objective:   Physical Exam   Constitutional: She is oriented to person, place, and time. She appears well-developed and well-nourished. No distress. Pulmonary/Chest: Effort normal.   Abdominal: Soft. She exhibits no distension. There is no tenderness. There is no guarding. Genitourinary: No labial fusion. There is no rash, tenderness, lesion or injury on the right labia. There is no rash, tenderness, lesion or injury on the left labia.  Uterus is

## 2019-04-12 NOTE — TELEPHONE ENCOUNTER
Patient recalled, informed we could offer sooner date for surgery. Currently have 5/16/19 available. Patient is going to discuss with her HR dept. for short term disablity and will recall the office if able to proceed.

## 2019-04-26 NOTE — TELEPHONE ENCOUNTER
Left message for patient to recall the office. Need to see if she is still interested in 5/16/19 surgery date? If so, will need to schedule sign consents, pre-op and post op visits.

## 2019-04-30 NOTE — TELEPHONE ENCOUNTER
Spoke to patient, states she is not able to do surgery in May. Requesting to wait until September. Patient to recall once she has set up work leave for September to schedule surgery.

## 2019-06-21 ENCOUNTER — TELEPHONE (OUTPATIENT)
Dept: FAMILY MEDICINE CLINIC | Age: 39
End: 2019-06-21

## 2019-06-24 NOTE — TELEPHONE ENCOUNTER
Spoke to patient, ok to proceed with scheduling surgery for 9/19/19. Patient requesting a letter for employer to be excused for any absences due to heavy bleeding with menses until surgery date. Advised patient to contact her HR dept for FMLA forms and we can fill out for intermittent leave prior to surgery and also leave for surgery. Patient verbalized understanding and will contact her HR dept. Routing to Dr Charles Tanner for review.

## 2019-06-24 NOTE — TELEPHONE ENCOUNTER
Pt returning call. States her waist circumference is 41\". Would like to have a copy of the form faxed to her at [phone]. Would also like to  original. Hoping this can be taken care of today.  Please call Inna back at [phone]

## 2019-06-24 NOTE — TELEPHONE ENCOUNTER
Tried calling patient, number says not reachable. I have sent fax to number provided. Original is in drawer up front.

## 2019-06-25 NOTE — TELEPHONE ENCOUNTER
As above. Please make sure ultrasound is scheduled during preop visit to reevaluate size of fibroids. Thanks.

## 2019-07-17 ENCOUNTER — TELEPHONE (OUTPATIENT)
Dept: OBGYN CLINIC | Age: 39
End: 2019-07-17

## 2019-07-29 ENCOUNTER — TELEPHONE (OUTPATIENT)
Dept: OBGYN CLINIC | Age: 39
End: 2019-07-29

## 2019-08-26 ENCOUNTER — OFFICE VISIT (OUTPATIENT)
Dept: FAMILY MEDICINE CLINIC | Age: 39
End: 2019-08-26
Payer: COMMERCIAL

## 2019-08-26 VITALS
SYSTOLIC BLOOD PRESSURE: 141 MMHG | HEIGHT: 60 IN | DIASTOLIC BLOOD PRESSURE: 85 MMHG | OXYGEN SATURATION: 98 % | HEART RATE: 90 BPM | RESPIRATION RATE: 16 BRPM | WEIGHT: 214 LBS | BODY MASS INDEX: 42.01 KG/M2

## 2019-08-26 DIAGNOSIS — E78.2 MIXED HYPERLIPIDEMIA: ICD-10-CM

## 2019-08-26 DIAGNOSIS — N87.9 DYSPLASIA OF CERVIX UTERI: ICD-10-CM

## 2019-08-26 DIAGNOSIS — E66.01 MORBID OBESITY WITH BMI OF 40.0-44.9, ADULT (HCC): ICD-10-CM

## 2019-08-26 DIAGNOSIS — N94.6 DYSMENORRHEA: ICD-10-CM

## 2019-08-26 DIAGNOSIS — Z01.818 PRE-OP EXAM: Primary | ICD-10-CM

## 2019-08-26 DIAGNOSIS — E61.1 IRON DEFICIENCY: ICD-10-CM

## 2019-08-26 DIAGNOSIS — I10 HYPERTENSION, UNSPECIFIED TYPE: ICD-10-CM

## 2019-08-26 LAB
ANION GAP SERPL CALCULATED.3IONS-SCNC: 14 MMOL/L (ref 3–16)
BUN BLDV-MCNC: 14 MG/DL (ref 7–20)
CALCIUM SERPL-MCNC: 9 MG/DL (ref 8.3–10.6)
CHLORIDE BLD-SCNC: 100 MMOL/L (ref 99–110)
CHOLESTEROL, TOTAL: 188 MG/DL (ref 0–199)
CO2: 25 MMOL/L (ref 21–32)
CREAT SERPL-MCNC: 0.8 MG/DL (ref 0.6–1.1)
GFR AFRICAN AMERICAN: >60
GFR NON-AFRICAN AMERICAN: >60
GLUCOSE BLD-MCNC: 93 MG/DL (ref 70–99)
HCT VFR BLD CALC: 37.7 % (ref 36–48)
HDLC SERPL-MCNC: 46 MG/DL (ref 40–60)
HEMOGLOBIN: 12.2 G/DL (ref 12–16)
LDL CHOLESTEROL CALCULATED: 124 MG/DL
MCH RBC QN AUTO: 26.1 PG (ref 26–34)
MCHC RBC AUTO-ENTMCNC: 32.4 G/DL (ref 31–36)
MCV RBC AUTO: 80.6 FL (ref 80–100)
PDW BLD-RTO: 15.3 % (ref 12.4–15.4)
PLATELET # BLD: 428 K/UL (ref 135–450)
PMV BLD AUTO: 7.1 FL (ref 5–10.5)
POTASSIUM SERPL-SCNC: 4.2 MMOL/L (ref 3.5–5.1)
RBC # BLD: 4.68 M/UL (ref 4–5.2)
SODIUM BLD-SCNC: 139 MMOL/L (ref 136–145)
TRIGL SERPL-MCNC: 89 MG/DL (ref 0–150)
VLDLC SERPL CALC-MCNC: 18 MG/DL
WBC # BLD: 7.2 K/UL (ref 4–11)

## 2019-08-26 PROCEDURE — 99242 OFF/OP CONSLTJ NEW/EST SF 20: CPT | Performed by: INTERNAL MEDICINE

## 2019-08-26 PROCEDURE — 93000 ELECTROCARDIOGRAM COMPLETE: CPT | Performed by: INTERNAL MEDICINE

## 2019-08-26 PROCEDURE — 36415 COLL VENOUS BLD VENIPUNCTURE: CPT | Performed by: INTERNAL MEDICINE

## 2019-08-26 NOTE — LETTER
Preoperative Consultation      Marco Apapo Steinberg  YOB: 1980    Date of Service:  8/26/2019    There were no vitals filed for this visit. Wt Readings from Last 2 Encounters:   03/29/19 212 lb (96.2 kg)   12/20/18 210 lb (95.3 kg)     BP Readings from Last 3 Encounters:   03/29/19 (!) 174/98   12/20/18 (!) 139/90   08/20/18 134/68        Chronic health issues include hypertension, BMI 40, foot pain, miscarriage ×1, LEEP, fibroids and menorrhagia. Allergies   Allergen Reactions    Ace Inhibitors      cough     Outpatient Medications Marked as Taking for the 8/26/19 encounter (Office Visit) with Sabina Mackenzie MD   Medication Sig Dispense Refill    hydrochlorothiazide (HYDRODIURIL) 25 MG tablet TAKE ONE TABLET BY MOUTH ONE TIME A  tablet 0       This patient presents to the office today for a preoperative consultation at the request of surgeon, Neeraj Juan who plans on performing robotic hysterectomy salpingectom on August 29 at [facility] . The current problem began 2008 year ago with abnormal cells. + fibroids, and bleeding  HPV.     Planned anesthesia: general   Known anesthesia problems: None   Bleeding risk: No recent or remote history of abnormal bleeding  Personal or FH of DVT/PE: No    Patient objection to receiving blood products: No    Patient Active Problem List   Diagnosis    Hypertension    Morbid obesity with BMI of 40.0-44.9, adult ([address]    Dysplasia of cervix uteri    Subserous leiomyoma of uterus    S/P conization of cervix    Hidradenitis suppurativa    Hyperlipidemia    Dysmenorrhea    Menorrhagia with regular cycle       Past Medical History:   Diagnosis Date    Arthritis     Hyperlipidemia 12/19/2018    Hyperlipidemia 12/19/2018    Hypertension     Morbid obesity with BMI of 40.0-44.9, adult ([address] 5/28/2018     Past Surgical History:   Procedure Laterality Date    DC COLPOSCOPY,ENTIRE VAGINA N/A 7/27/2018

## 2019-08-26 NOTE — PROGRESS NOTES
Preoperative Consultation      Sea Steinberg  YOB: 1980    Date of Service:  8/26/2019    There were no vitals filed for this visit. Wt Readings from Last 2 Encounters:   03/29/19 212 lb (96.2 kg)   12/20/18 210 lb (95.3 kg)     BP Readings from Last 3 Encounters:   03/29/19 (!) 174/98   12/20/18 (!) 139/90   08/20/18 134/68        Chronic health issues include hypertension, BMI 40, foot pain, miscarriage ×1, LEEP, fibroids and menorrhagia. Allergies   Allergen Reactions    Ace Inhibitors      cough     Outpatient Medications Marked as Taking for the 8/26/19 encounter (Office Visit) with Bernadette Wood MD   Medication Sig Dispense Refill    hydrochlorothiazide (HYDRODIURIL) 25 MG tablet TAKE ONE TABLET BY MOUTH ONE TIME A  tablet 0       This patient presents to the office today for a preoperative consultation at the request of surgeon, Amanda Sánchez who plans on performing robotic hysterectomy salpingectom on August 29 at [facility] . The current problem began 2008 year ago with abnormal cells. + fibroids, and bleeding  HPV.     Planned anesthesia: general   Known anesthesia problems: None   Bleeding risk: No recent or remote history of abnormal bleeding  Personal or FH of DVT/PE: No    Patient objection to receiving blood products: No    Patient Active Problem List   Diagnosis    Hypertension    Morbid obesity with BMI of 40.0-44.9, adult ([address]    Dysplasia of cervix uteri    Subserous leiomyoma of uterus    S/P conization of cervix    Hidradenitis suppurativa    Hyperlipidemia    Dysmenorrhea    Menorrhagia with regular cycle       Past Medical History:   Diagnosis Date    Arthritis     Hyperlipidemia 12/19/2018    Hyperlipidemia 12/19/2018    Hypertension     Morbid obesity with BMI of 40.0-44.9, adult ([address] 5/28/2018     Past Surgical History:   Procedure Laterality Date    ND COLPOSCOPY,ENTIRE VAGINA N/A 7/27/2018    CERVICAL COLD KNIFE CONIZATION, without any wheezes rales or rhonchi. Cardiovascular exam regular rate and rhythm without any murmur or click. Abdomen is benign. No hepatosplenomegaly. Crepitus of the knees bilaterally. No peripheral edema no cyanosis or clubbing. EKG Interpretation:  normal EKG, normal sinus rhythm. Lab Review   No visits with results within 2 Month(s) from this visit. Latest known visit with results is:   Office Visit on 12/20/2018   Component Date Value    Sodium 12/20/2018 142     Potassium 12/20/2018 4.9     Chloride 12/20/2018 102     CO2 12/20/2018 21     Anion Gap 12/20/2018 19*    Glucose 12/20/2018 65*    BUN 12/20/2018 10     CREATININE 12/20/2018 0.8     GFR Non- 12/20/2018 >60     GFR  12/20/2018 >60     Calcium 12/20/2018 9.1     Hemoglobin A1C 12/20/2018 5.6     eAG 12/20/2018 114.0     Cholesterol, Total 12/20/2018 192     Triglycerides 12/20/2018 140     HDL 12/20/2018 36*    LDL Calculated 12/20/2018 128*    VLDL Cholesterol Calcula* 12/20/2018 28     CRP 12/20/2018 22.3*    Iron 12/20/2018 33*    TIBC 12/20/2018 348     Iron Saturation 12/20/2018 9*           Assessment:       34 y.o. patient with planned surgery as above. Known risk factors for perioperative complications: Anemia, Hypertension snoring without known apnea  Current medications which may produce withdrawal symptoms if withheld perioperatively: none      Plan:     1. Preoperative workup as follows: ECG, hemoglobin, hematocrit, electrolytes, creatinine, glucose  2. Change in medication regimen before surgery: Hold all medications on morning of surgery  4. Deep vein thrombosis prophylaxis: regimen to be chosen by surgical team  5.  No contraindications to planned surgery

## 2019-08-27 ENCOUNTER — OFFICE VISIT (OUTPATIENT)
Dept: OBGYN CLINIC | Age: 39
End: 2019-08-27
Payer: COMMERCIAL

## 2019-08-27 VITALS
SYSTOLIC BLOOD PRESSURE: 134 MMHG | WEIGHT: 216 LBS | HEART RATE: 85 BPM | TEMPERATURE: 98.8 F | DIASTOLIC BLOOD PRESSURE: 82 MMHG | BODY MASS INDEX: 42.18 KG/M2

## 2019-08-27 DIAGNOSIS — Z98.890 S/P CONIZATION OF CERVIX: ICD-10-CM

## 2019-08-27 DIAGNOSIS — N92.0 MENORRHAGIA WITH REGULAR CYCLE: ICD-10-CM

## 2019-08-27 DIAGNOSIS — N94.6 DYSMENORRHEA: ICD-10-CM

## 2019-08-27 DIAGNOSIS — E66.01 MORBID OBESITY WITH BMI OF 40.0-44.9, ADULT (HCC): ICD-10-CM

## 2019-08-27 DIAGNOSIS — D25.2 SUBSEROUS LEIOMYOMA OF UTERUS: ICD-10-CM

## 2019-08-27 DIAGNOSIS — N87.9 DYSPLASIA OF CERVIX UTERI: ICD-10-CM

## 2019-08-27 DIAGNOSIS — N92.0 MENORRHAGIA WITH REGULAR CYCLE: Primary | ICD-10-CM

## 2019-08-27 DIAGNOSIS — N94.6 DYSMENORRHEA: Primary | ICD-10-CM

## 2019-08-27 LAB
ESTIMATED AVERAGE GLUCOSE: 108.3 MG/DL
HBA1C MFR BLD: 5.4 %

## 2019-08-27 PROCEDURE — 76856 US EXAM PELVIC COMPLETE: CPT | Performed by: OBSTETRICS & GYNECOLOGY

## 2019-08-27 PROCEDURE — 99213 OFFICE O/P EST LOW 20 MIN: CPT | Performed by: OBSTETRICS & GYNECOLOGY

## 2019-08-28 ENCOUNTER — ANESTHESIA EVENT (OUTPATIENT)
Dept: OPERATING ROOM | Age: 39
End: 2019-08-28
Payer: COMMERCIAL

## 2019-08-29 ENCOUNTER — HOSPITAL ENCOUNTER (OUTPATIENT)
Age: 39
Discharge: HOME | End: 2019-08-30
Attending: OBSTETRICS & GYNECOLOGY | Admitting: OBSTETRICS & GYNECOLOGY
Payer: COMMERCIAL

## 2019-08-29 ENCOUNTER — ANESTHESIA (OUTPATIENT)
Dept: OPERATING ROOM | Age: 39
End: 2019-08-29
Payer: COMMERCIAL

## 2019-08-29 VITALS
DIASTOLIC BLOOD PRESSURE: 96 MMHG | TEMPERATURE: 97.5 F | SYSTOLIC BLOOD PRESSURE: 108 MMHG | OXYGEN SATURATION: 100 % | RESPIRATION RATE: 14 BRPM

## 2019-08-29 DIAGNOSIS — Z98.890 S/P CONIZATION OF CERVIX: ICD-10-CM

## 2019-08-29 DIAGNOSIS — N87.9 CERVICAL DYSPLASIA: ICD-10-CM

## 2019-08-29 DIAGNOSIS — Z98.890 S/P ROBOT-ASSISTED SURGICAL PROCEDURE: Primary | ICD-10-CM

## 2019-08-29 DIAGNOSIS — D25.2 FIBROIDS, SUBSEROUS: ICD-10-CM

## 2019-08-29 DIAGNOSIS — N93.9 ABNORMAL UTERINE BLEEDING: ICD-10-CM

## 2019-08-29 LAB
ABO/RH: NORMAL
ANTIBODY SCREEN: NORMAL
PREGNANCY, URINE: NEGATIVE

## 2019-08-29 PROCEDURE — 6360000002 HC RX W HCPCS: Performed by: OBSTETRICS & GYNECOLOGY

## 2019-08-29 PROCEDURE — 2720000010 HC SURG SUPPLY STERILE: Performed by: OBSTETRICS & GYNECOLOGY

## 2019-08-29 PROCEDURE — 6360000002 HC RX W HCPCS: Performed by: ANESTHESIOLOGY

## 2019-08-29 PROCEDURE — 2580000003 HC RX 258: Performed by: OBSTETRICS & GYNECOLOGY

## 2019-08-29 PROCEDURE — 58554 LAPARO-VAG HYST W/T/O COMPL: CPT | Performed by: OBSTETRICS & GYNECOLOGY

## 2019-08-29 PROCEDURE — 84703 CHORIONIC GONADOTROPIN ASSAY: CPT

## 2019-08-29 PROCEDURE — 6370000000 HC RX 637 (ALT 250 FOR IP): Performed by: ANESTHESIOLOGY

## 2019-08-29 PROCEDURE — 2580000003 HC RX 258: Performed by: ANESTHESIOLOGY

## 2019-08-29 PROCEDURE — 86901 BLOOD TYPING SEROLOGIC RH(D): CPT

## 2019-08-29 PROCEDURE — 2500000003 HC RX 250 WO HCPCS: Performed by: NURSE ANESTHETIST, CERTIFIED REGISTERED

## 2019-08-29 PROCEDURE — 7100000001 HC PACU RECOVERY - ADDTL 15 MIN: Performed by: OBSTETRICS & GYNECOLOGY

## 2019-08-29 PROCEDURE — 86850 RBC ANTIBODY SCREEN: CPT

## 2019-08-29 PROCEDURE — 3600000009 HC SURGERY ROBOT BASE: Performed by: OBSTETRICS & GYNECOLOGY

## 2019-08-29 PROCEDURE — 6370000000 HC RX 637 (ALT 250 FOR IP): Performed by: OBSTETRICS & GYNECOLOGY

## 2019-08-29 PROCEDURE — 2709999900 HC NON-CHARGEABLE SUPPLY: Performed by: OBSTETRICS & GYNECOLOGY

## 2019-08-29 PROCEDURE — 88307 TISSUE EXAM BY PATHOLOGIST: CPT

## 2019-08-29 PROCEDURE — 3600000019 HC SURGERY ROBOT ADDTL 15MIN: Performed by: OBSTETRICS & GYNECOLOGY

## 2019-08-29 PROCEDURE — 3700000000 HC ANESTHESIA ATTENDED CARE: Performed by: OBSTETRICS & GYNECOLOGY

## 2019-08-29 PROCEDURE — 6360000002 HC RX W HCPCS: Performed by: NURSE ANESTHETIST, CERTIFIED REGISTERED

## 2019-08-29 PROCEDURE — 7100000000 HC PACU RECOVERY - FIRST 15 MIN: Performed by: OBSTETRICS & GYNECOLOGY

## 2019-08-29 PROCEDURE — 3700000001 HC ADD 15 MINUTES (ANESTHESIA): Performed by: OBSTETRICS & GYNECOLOGY

## 2019-08-29 PROCEDURE — S2900 ROBOTIC SURGICAL SYSTEM: HCPCS | Performed by: OBSTETRICS & GYNECOLOGY

## 2019-08-29 PROCEDURE — 86900 BLOOD TYPING SEROLOGIC ABO: CPT

## 2019-08-29 RX ORDER — SODIUM CHLORIDE 0.9 % (FLUSH) 0.9 %
SYRINGE (ML) INJECTION
Status: DISPENSED
Start: 2019-08-29 | End: 2019-08-29

## 2019-08-29 RX ORDER — SODIUM CHLORIDE 0.9 % (FLUSH) 0.9 %
10 SYRINGE (ML) INJECTION EVERY 12 HOURS SCHEDULED
Status: DISCONTINUED | OUTPATIENT
Start: 2019-08-29 | End: 2019-08-29 | Stop reason: SDUPTHER

## 2019-08-29 RX ORDER — ONDANSETRON 2 MG/ML
4 INJECTION INTRAMUSCULAR; INTRAVENOUS EVERY 6 HOURS PRN
Status: DISCONTINUED | OUTPATIENT
Start: 2019-08-29 | End: 2019-08-30 | Stop reason: HOSPADM

## 2019-08-29 RX ORDER — SODIUM CHLORIDE 0.9 % (FLUSH) 0.9 %
10 SYRINGE (ML) INJECTION EVERY 12 HOURS SCHEDULED
Status: DISCONTINUED | OUTPATIENT
Start: 2019-08-29 | End: 2019-08-30 | Stop reason: HOSPADM

## 2019-08-29 RX ORDER — SODIUM CHLORIDE 0.9 % (FLUSH) 0.9 %
10 SYRINGE (ML) INJECTION PRN
Status: DISCONTINUED | OUTPATIENT
Start: 2019-08-29 | End: 2019-08-29 | Stop reason: HOSPADM

## 2019-08-29 RX ORDER — SIMETHICONE 80 MG
80 TABLET,CHEWABLE ORAL EVERY 6 HOURS PRN
Status: DISCONTINUED | OUTPATIENT
Start: 2019-08-29 | End: 2019-08-30 | Stop reason: HOSPADM

## 2019-08-29 RX ORDER — ROCURONIUM BROMIDE 10 MG/ML
INJECTION, SOLUTION INTRAVENOUS PRN
Status: DISCONTINUED | OUTPATIENT
Start: 2019-08-29 | End: 2019-08-29 | Stop reason: SDUPTHER

## 2019-08-29 RX ORDER — DOCUSATE SODIUM 100 MG/1
100 CAPSULE, LIQUID FILLED ORAL 2 TIMES DAILY
Status: DISCONTINUED | OUTPATIENT
Start: 2019-08-29 | End: 2019-08-30 | Stop reason: HOSPADM

## 2019-08-29 RX ORDER — LIDOCAINE HYDROCHLORIDE 20 MG/ML
INJECTION, SOLUTION EPIDURAL; INFILTRATION; INTRACAUDAL; PERINEURAL PRN
Status: DISCONTINUED | OUTPATIENT
Start: 2019-08-29 | End: 2019-08-29 | Stop reason: SDUPTHER

## 2019-08-29 RX ORDER — SODIUM CHLORIDE, SODIUM LACTATE, POTASSIUM CHLORIDE, CALCIUM CHLORIDE 600; 310; 30; 20 MG/100ML; MG/100ML; MG/100ML; MG/100ML
INJECTION, SOLUTION INTRAVENOUS CONTINUOUS
Status: DISCONTINUED | OUTPATIENT
Start: 2019-08-29 | End: 2019-08-30 | Stop reason: HOSPADM

## 2019-08-29 RX ORDER — OXYCODONE HYDROCHLORIDE AND ACETAMINOPHEN 5; 325 MG/1; MG/1
2 TABLET ORAL PRN
Status: COMPLETED | OUTPATIENT
Start: 2019-08-29 | End: 2019-08-29

## 2019-08-29 RX ORDER — APREPITANT 40 MG/1
40 CAPSULE ORAL ONCE
Status: COMPLETED | OUTPATIENT
Start: 2019-08-29 | End: 2019-08-29

## 2019-08-29 RX ORDER — SODIUM CHLORIDE 0.9 % (FLUSH) 0.9 %
10 SYRINGE (ML) INJECTION EVERY 12 HOURS SCHEDULED
Status: DISCONTINUED | OUTPATIENT
Start: 2019-08-29 | End: 2019-08-29 | Stop reason: HOSPADM

## 2019-08-29 RX ORDER — HYDROCHLOROTHIAZIDE 25 MG/1
25 TABLET ORAL DAILY
Status: DISCONTINUED | OUTPATIENT
Start: 2019-08-30 | End: 2019-08-30 | Stop reason: HOSPADM

## 2019-08-29 RX ORDER — ONDANSETRON 2 MG/ML
INJECTION INTRAMUSCULAR; INTRAVENOUS PRN
Status: DISCONTINUED | OUTPATIENT
Start: 2019-08-29 | End: 2019-08-29 | Stop reason: SDUPTHER

## 2019-08-29 RX ORDER — DEXAMETHASONE SODIUM PHOSPHATE 4 MG/ML
INJECTION, SOLUTION INTRA-ARTICULAR; INTRALESIONAL; INTRAMUSCULAR; INTRAVENOUS; SOFT TISSUE PRN
Status: DISCONTINUED | OUTPATIENT
Start: 2019-08-29 | End: 2019-08-29 | Stop reason: SDUPTHER

## 2019-08-29 RX ORDER — MIDAZOLAM HYDROCHLORIDE 1 MG/ML
INJECTION INTRAMUSCULAR; INTRAVENOUS PRN
Status: DISCONTINUED | OUTPATIENT
Start: 2019-08-29 | End: 2019-08-29 | Stop reason: SDUPTHER

## 2019-08-29 RX ORDER — HYDROMORPHONE HCL 110MG/55ML
PATIENT CONTROLLED ANALGESIA SYRINGE INTRAVENOUS PRN
Status: DISCONTINUED | OUTPATIENT
Start: 2019-08-29 | End: 2019-08-29 | Stop reason: SDUPTHER

## 2019-08-29 RX ORDER — OXYCODONE HYDROCHLORIDE AND ACETAMINOPHEN 5; 325 MG/1; MG/1
1 TABLET ORAL EVERY 6 HOURS PRN
Status: DISCONTINUED | OUTPATIENT
Start: 2019-08-29 | End: 2019-08-30 | Stop reason: HOSPADM

## 2019-08-29 RX ORDER — OXYCODONE HYDROCHLORIDE AND ACETAMINOPHEN 5; 325 MG/1; MG/1
1 TABLET ORAL PRN
Status: COMPLETED | OUTPATIENT
Start: 2019-08-29 | End: 2019-08-29

## 2019-08-29 RX ORDER — OXYCODONE HYDROCHLORIDE AND ACETAMINOPHEN 5; 325 MG/1; MG/1
2 TABLET ORAL EVERY 6 HOURS PRN
Status: DISCONTINUED | OUTPATIENT
Start: 2019-08-29 | End: 2019-08-30 | Stop reason: HOSPADM

## 2019-08-29 RX ORDER — FENTANYL CITRATE 50 UG/ML
INJECTION, SOLUTION INTRAMUSCULAR; INTRAVENOUS PRN
Status: DISCONTINUED | OUTPATIENT
Start: 2019-08-29 | End: 2019-08-29 | Stop reason: SDUPTHER

## 2019-08-29 RX ORDER — HYDRALAZINE HYDROCHLORIDE 20 MG/ML
5 INJECTION INTRAMUSCULAR; INTRAVENOUS EVERY 10 MIN PRN
Status: DISCONTINUED | OUTPATIENT
Start: 2019-08-29 | End: 2019-08-29 | Stop reason: HOSPADM

## 2019-08-29 RX ORDER — KETOROLAC TROMETHAMINE 30 MG/ML
30 INJECTION, SOLUTION INTRAMUSCULAR; INTRAVENOUS ONCE
Status: COMPLETED | OUTPATIENT
Start: 2019-08-29 | End: 2019-08-29

## 2019-08-29 RX ORDER — SODIUM CHLORIDE, SODIUM LACTATE, POTASSIUM CHLORIDE, AND CALCIUM CHLORIDE .6; .31; .03; .02 G/100ML; G/100ML; G/100ML; G/100ML
IRRIGANT IRRIGATION PRN
Status: DISCONTINUED | OUTPATIENT
Start: 2019-08-29 | End: 2019-08-29 | Stop reason: ALTCHOICE

## 2019-08-29 RX ORDER — MEPERIDINE HYDROCHLORIDE 50 MG/ML
12.5 INJECTION INTRAMUSCULAR; INTRAVENOUS; SUBCUTANEOUS EVERY 5 MIN PRN
Status: DISCONTINUED | OUTPATIENT
Start: 2019-08-29 | End: 2019-08-29 | Stop reason: HOSPADM

## 2019-08-29 RX ORDER — KETOROLAC TROMETHAMINE 30 MG/ML
INJECTION, SOLUTION INTRAMUSCULAR; INTRAVENOUS
Status: DISPENSED
Start: 2019-08-29 | End: 2019-08-30

## 2019-08-29 RX ORDER — LIDOCAINE HYDROCHLORIDE 10 MG/ML
0.3 INJECTION, SOLUTION EPIDURAL; INFILTRATION; INTRACAUDAL; PERINEURAL
Status: DISCONTINUED | OUTPATIENT
Start: 2019-08-29 | End: 2019-08-29 | Stop reason: HOSPADM

## 2019-08-29 RX ORDER — PROPOFOL 10 MG/ML
INJECTION, EMULSION INTRAVENOUS PRN
Status: DISCONTINUED | OUTPATIENT
Start: 2019-08-29 | End: 2019-08-29 | Stop reason: SDUPTHER

## 2019-08-29 RX ORDER — ACETAMINOPHEN 325 MG/1
650 TABLET ORAL EVERY 4 HOURS PRN
Status: DISCONTINUED | OUTPATIENT
Start: 2019-08-29 | End: 2019-08-30 | Stop reason: HOSPADM

## 2019-08-29 RX ORDER — ONDANSETRON 2 MG/ML
4 INJECTION INTRAMUSCULAR; INTRAVENOUS
Status: DISCONTINUED | OUTPATIENT
Start: 2019-08-29 | End: 2019-08-29 | Stop reason: HOSPADM

## 2019-08-29 RX ORDER — KETOROLAC TROMETHAMINE 30 MG/ML
30 INJECTION, SOLUTION INTRAMUSCULAR; INTRAVENOUS EVERY 6 HOURS
Status: DISCONTINUED | OUTPATIENT
Start: 2019-08-29 | End: 2019-08-30 | Stop reason: HOSPADM

## 2019-08-29 RX ORDER — PROMETHAZINE HYDROCHLORIDE 25 MG/ML
6.25 INJECTION, SOLUTION INTRAMUSCULAR; INTRAVENOUS
Status: DISCONTINUED | OUTPATIENT
Start: 2019-08-29 | End: 2019-08-29 | Stop reason: HOSPADM

## 2019-08-29 RX ORDER — FENTANYL CITRATE 50 UG/ML
25 INJECTION, SOLUTION INTRAMUSCULAR; INTRAVENOUS EVERY 5 MIN PRN
Status: DISCONTINUED | OUTPATIENT
Start: 2019-08-29 | End: 2019-08-29 | Stop reason: HOSPADM

## 2019-08-29 RX ORDER — SCOLOPAMINE TRANSDERMAL SYSTEM 1 MG/1
1 PATCH, EXTENDED RELEASE TRANSDERMAL
Status: DISCONTINUED | OUTPATIENT
Start: 2019-08-29 | End: 2019-08-29 | Stop reason: HOSPADM

## 2019-08-29 RX ORDER — HYDROMORPHONE HCL 110MG/55ML
0.5 PATIENT CONTROLLED ANALGESIA SYRINGE INTRAVENOUS
Status: DISCONTINUED | OUTPATIENT
Start: 2019-08-29 | End: 2019-08-30 | Stop reason: HOSPADM

## 2019-08-29 RX ORDER — SODIUM CHLORIDE 0.9 % (FLUSH) 0.9 %
10 SYRINGE (ML) INJECTION PRN
Status: DISCONTINUED | OUTPATIENT
Start: 2019-08-29 | End: 2019-08-30 | Stop reason: HOSPADM

## 2019-08-29 RX ORDER — SODIUM CHLORIDE, SODIUM LACTATE, POTASSIUM CHLORIDE, CALCIUM CHLORIDE 600; 310; 30; 20 MG/100ML; MG/100ML; MG/100ML; MG/100ML
INJECTION, SOLUTION INTRAVENOUS CONTINUOUS
Status: DISCONTINUED | OUTPATIENT
Start: 2019-08-29 | End: 2019-08-29

## 2019-08-29 RX ADMIN — MIDAZOLAM HYDROCHLORIDE 2 MG: 2 INJECTION, SOLUTION INTRAMUSCULAR; INTRAVENOUS at 11:02

## 2019-08-29 RX ADMIN — HYDROMORPHONE HYDROCHLORIDE 0.5 MG: 2 INJECTION INTRAMUSCULAR; INTRAVENOUS; SUBCUTANEOUS at 12:59

## 2019-08-29 RX ADMIN — ROCURONIUM BROMIDE 50 MG: 10 SOLUTION INTRAVENOUS at 11:08

## 2019-08-29 RX ADMIN — HYDROMORPHONE HYDROCHLORIDE 0.5 MG: 2 INJECTION INTRAMUSCULAR; INTRAVENOUS; SUBCUTANEOUS at 12:48

## 2019-08-29 RX ADMIN — DOCUSATE SODIUM 100 MG: 100 CAPSULE, LIQUID FILLED ORAL at 21:50

## 2019-08-29 RX ADMIN — SUGAMMADEX 200 MG: 100 INJECTION, SOLUTION INTRAVENOUS at 13:01

## 2019-08-29 RX ADMIN — KETOROLAC TROMETHAMINE 30 MG: 30 INJECTION, SOLUTION INTRAMUSCULAR at 14:47

## 2019-08-29 RX ADMIN — FENTANYL CITRATE 100 MCG: 50 INJECTION INTRAMUSCULAR; INTRAVENOUS at 11:08

## 2019-08-29 RX ADMIN — ROCURONIUM BROMIDE 10 MG: 10 SOLUTION INTRAVENOUS at 11:45

## 2019-08-29 RX ADMIN — SODIUM CHLORIDE, POTASSIUM CHLORIDE, SODIUM LACTATE AND CALCIUM CHLORIDE: 600; 310; 30; 20 INJECTION, SOLUTION INTRAVENOUS at 13:09

## 2019-08-29 RX ADMIN — APREPITANT 40 MG: 40 CAPSULE ORAL at 10:56

## 2019-08-29 RX ADMIN — OXYCODONE HYDROCHLORIDE AND ACETAMINOPHEN 1 TABLET: 5; 325 TABLET ORAL at 17:46

## 2019-08-29 RX ADMIN — DEXAMETHASONE SODIUM PHOSPHATE 8 MG: 4 INJECTION, SOLUTION INTRAMUSCULAR; INTRAVENOUS at 11:25

## 2019-08-29 RX ADMIN — HYDROMORPHONE HYDROCHLORIDE 0.5 MG: 1 INJECTION, SOLUTION INTRAMUSCULAR; INTRAVENOUS; SUBCUTANEOUS at 13:38

## 2019-08-29 RX ADMIN — SIMETHICONE 80 MG: 80 TABLET, CHEWABLE ORAL at 21:50

## 2019-08-29 RX ADMIN — KETOROLAC TROMETHAMINE 30 MG: 30 INJECTION, SOLUTION INTRAMUSCULAR at 19:56

## 2019-08-29 RX ADMIN — HYDROMORPHONE HYDROCHLORIDE 1 MG: 2 INJECTION INTRAMUSCULAR; INTRAVENOUS; SUBCUTANEOUS at 11:45

## 2019-08-29 RX ADMIN — Medication 2 G: at 11:11

## 2019-08-29 RX ADMIN — SODIUM CHLORIDE, POTASSIUM CHLORIDE, SODIUM LACTATE AND CALCIUM CHLORIDE: 600; 310; 30; 20 INJECTION, SOLUTION INTRAVENOUS at 10:56

## 2019-08-29 RX ADMIN — ONDANSETRON 4 MG: 2 INJECTION INTRAMUSCULAR; INTRAVENOUS at 11:22

## 2019-08-29 RX ADMIN — SODIUM CHLORIDE, POTASSIUM CHLORIDE, SODIUM LACTATE AND CALCIUM CHLORIDE: 600; 310; 30; 20 INJECTION, SOLUTION INTRAVENOUS at 10:30

## 2019-08-29 RX ADMIN — SODIUM CHLORIDE, POTASSIUM CHLORIDE, SODIUM LACTATE AND CALCIUM CHLORIDE: 600; 310; 30; 20 INJECTION, SOLUTION INTRAVENOUS at 19:58

## 2019-08-29 RX ADMIN — PROPOFOL 200 MG: 10 INJECTION, EMULSION INTRAVENOUS at 11:08

## 2019-08-29 RX ADMIN — LIDOCAINE HYDROCHLORIDE 60 MG: 20 INJECTION, SOLUTION EPIDURAL; INFILTRATION; INTRACAUDAL; PERINEURAL at 11:08

## 2019-08-29 RX ADMIN — HYDROMORPHONE HYDROCHLORIDE 0.5 MG: 1 INJECTION, SOLUTION INTRAMUSCULAR; INTRAVENOUS; SUBCUTANEOUS at 13:31

## 2019-08-29 ASSESSMENT — PULMONARY FUNCTION TESTS
PIF_VALUE: 28
PIF_VALUE: 38
PIF_VALUE: 17
PIF_VALUE: 28
PIF_VALUE: 35
PIF_VALUE: 31
PIF_VALUE: 26
PIF_VALUE: 17
PIF_VALUE: 0
PIF_VALUE: 31
PIF_VALUE: 1
PIF_VALUE: 35
PIF_VALUE: 31
PIF_VALUE: 25
PIF_VALUE: 37
PIF_VALUE: 38
PIF_VALUE: 38
PIF_VALUE: 25
PIF_VALUE: 38
PIF_VALUE: 35
PIF_VALUE: 38
PIF_VALUE: 14
PIF_VALUE: 32
PIF_VALUE: 25
PIF_VALUE: 25
PIF_VALUE: 37
PIF_VALUE: 28
PIF_VALUE: 37
PIF_VALUE: 38
PIF_VALUE: 32
PIF_VALUE: 19
PIF_VALUE: 32
PIF_VALUE: 38
PIF_VALUE: 31
PIF_VALUE: 38
PIF_VALUE: 20
PIF_VALUE: 20
PIF_VALUE: 31
PIF_VALUE: 32
PIF_VALUE: 31
PIF_VALUE: 38
PIF_VALUE: 37
PIF_VALUE: 38
PIF_VALUE: 38
PIF_VALUE: 17
PIF_VALUE: 38
PIF_VALUE: 24
PIF_VALUE: 38
PIF_VALUE: 18
PIF_VALUE: 38
PIF_VALUE: 38
PIF_VALUE: 25
PIF_VALUE: 37
PIF_VALUE: 38
PIF_VALUE: 38
PIF_VALUE: 26
PIF_VALUE: 38
PIF_VALUE: 32
PIF_VALUE: 32
PIF_VALUE: 38
PIF_VALUE: 38
PIF_VALUE: 33
PIF_VALUE: 38
PIF_VALUE: 20
PIF_VALUE: 26
PIF_VALUE: 28
PIF_VALUE: 38
PIF_VALUE: 27
PIF_VALUE: 38
PIF_VALUE: 0
PIF_VALUE: 24
PIF_VALUE: 38
PIF_VALUE: 25
PIF_VALUE: 33
PIF_VALUE: 28
PIF_VALUE: 38
PIF_VALUE: 21
PIF_VALUE: 24
PIF_VALUE: 17
PIF_VALUE: 28
PIF_VALUE: 31
PIF_VALUE: 38
PIF_VALUE: 38
PIF_VALUE: 25
PIF_VALUE: 32
PIF_VALUE: 4
PIF_VALUE: 37
PIF_VALUE: 20
PIF_VALUE: 30
PIF_VALUE: 38
PIF_VALUE: 17
PIF_VALUE: 25
PIF_VALUE: 0
PIF_VALUE: 37
PIF_VALUE: 38
PIF_VALUE: 38
PIF_VALUE: 1
PIF_VALUE: 19
PIF_VALUE: 37
PIF_VALUE: 36
PIF_VALUE: 38
PIF_VALUE: 26
PIF_VALUE: 28
PIF_VALUE: 0
PIF_VALUE: 21
PIF_VALUE: 38
PIF_VALUE: 33
PIF_VALUE: 31
PIF_VALUE: 17
PIF_VALUE: 38
PIF_VALUE: 38
PIF_VALUE: 30
PIF_VALUE: 0
PIF_VALUE: 20
PIF_VALUE: 36
PIF_VALUE: 38
PIF_VALUE: 24
PIF_VALUE: 19
PIF_VALUE: 38
PIF_VALUE: 38
PIF_VALUE: 37
PIF_VALUE: 32
PIF_VALUE: 38

## 2019-08-29 ASSESSMENT — PAIN SCALES - GENERAL
PAINLEVEL_OUTOF10: 5
PAINLEVEL_OUTOF10: 7
PAINLEVEL_OUTOF10: 8
PAINLEVEL_OUTOF10: 7
PAINLEVEL_OUTOF10: 5
PAINLEVEL_OUTOF10: 7
PAINLEVEL_OUTOF10: 5
PAINLEVEL_OUTOF10: 5
PAINLEVEL_OUTOF10: 6

## 2019-08-29 ASSESSMENT — PAIN - FUNCTIONAL ASSESSMENT
PAIN_FUNCTIONAL_ASSESSMENT: ACTIVITIES ARE NOT PREVENTED
PAIN_FUNCTIONAL_ASSESSMENT: 0-10

## 2019-08-29 ASSESSMENT — PAIN DESCRIPTION - DESCRIPTORS
DESCRIPTORS: ACHING;BURNING
DESCRIPTORS: ACHING;BURNING;CONSTANT
DESCRIPTORS: ACHING;BURNING
DESCRIPTORS: ACHING
DESCRIPTORS: CRAMPING
DESCRIPTORS: ACHING;BURNING
DESCRIPTORS: ACHING;BURNING
DESCRIPTORS: ACHING
DESCRIPTORS: ACHING
DESCRIPTORS: ACHING;BURNING
DESCRIPTORS: ACHING

## 2019-08-29 ASSESSMENT — PAIN DESCRIPTION - ORIENTATION
ORIENTATION: LOWER

## 2019-08-29 ASSESSMENT — PAIN DESCRIPTION - LOCATION
LOCATION: ABDOMEN

## 2019-08-29 ASSESSMENT — PAIN DESCRIPTION - FREQUENCY
FREQUENCY: CONTINUOUS

## 2019-08-29 ASSESSMENT — PAIN DESCRIPTION - PROGRESSION
CLINICAL_PROGRESSION: GRADUALLY IMPROVING
CLINICAL_PROGRESSION: GRADUALLY IMPROVING
CLINICAL_PROGRESSION: NOT CHANGED
CLINICAL_PROGRESSION: GRADUALLY IMPROVING

## 2019-08-29 ASSESSMENT — PAIN DESCRIPTION - PAIN TYPE
TYPE: SURGICAL PAIN

## 2019-08-29 ASSESSMENT — PAIN DESCRIPTION - ONSET: ONSET: AWAKENED FROM SLEEP

## 2019-08-29 ASSESSMENT — LIFESTYLE VARIABLES: SMOKING_STATUS: 0

## 2019-08-30 VITALS
HEIGHT: 60 IN | WEIGHT: 228.62 LBS | TEMPERATURE: 99.8 F | DIASTOLIC BLOOD PRESSURE: 74 MMHG | SYSTOLIC BLOOD PRESSURE: 118 MMHG | BODY MASS INDEX: 44.88 KG/M2 | OXYGEN SATURATION: 98 % | HEART RATE: 96 BPM | RESPIRATION RATE: 18 BRPM

## 2019-08-30 LAB
BASOPHILS ABSOLUTE: 0 K/UL (ref 0–0.2)
BASOPHILS RELATIVE PERCENT: 0.3 %
EOSINOPHILS ABSOLUTE: 0.1 K/UL (ref 0–0.6)
EOSINOPHILS RELATIVE PERCENT: 0.6 %
HCT VFR BLD CALC: 35.7 % (ref 36–48)
HEMOGLOBIN: 11.3 G/DL (ref 12–16)
LYMPHOCYTES ABSOLUTE: 1.9 K/UL (ref 1–5.1)
LYMPHOCYTES RELATIVE PERCENT: 12.8 %
MCH RBC QN AUTO: 25.8 PG (ref 26–34)
MCHC RBC AUTO-ENTMCNC: 31.7 G/DL (ref 31–36)
MCV RBC AUTO: 81.4 FL (ref 80–100)
MONOCYTES ABSOLUTE: 0.7 K/UL (ref 0–1.3)
MONOCYTES RELATIVE PERCENT: 4.7 %
NEUTROPHILS ABSOLUTE: 12.3 K/UL (ref 1.7–7.7)
NEUTROPHILS RELATIVE PERCENT: 81.6 %
PDW BLD-RTO: 15 % (ref 12.4–15.4)
PLATELET # BLD: 355 K/UL (ref 135–450)
PLATELET SLIDE REVIEW: ADEQUATE
PMV BLD AUTO: 7.1 FL (ref 5–10.5)
RBC # BLD: 4.38 M/UL (ref 4–5.2)
SLIDE REVIEW: ABNORMAL
WBC # BLD: 15.1 K/UL (ref 4–11)

## 2019-08-30 PROCEDURE — 85025 COMPLETE CBC W/AUTO DIFF WBC: CPT

## 2019-08-30 PROCEDURE — 2580000003 HC RX 258: Performed by: OBSTETRICS & GYNECOLOGY

## 2019-08-30 PROCEDURE — 6370000000 HC RX 637 (ALT 250 FOR IP): Performed by: OBSTETRICS & GYNECOLOGY

## 2019-08-30 PROCEDURE — 6360000002 HC RX W HCPCS: Performed by: OBSTETRICS & GYNECOLOGY

## 2019-08-30 PROCEDURE — 36415 COLL VENOUS BLD VENIPUNCTURE: CPT

## 2019-08-30 RX ORDER — PSEUDOEPHEDRINE HCL 30 MG
100 TABLET ORAL 2 TIMES DAILY PRN
Qty: 20 CAPSULE | Refills: 0 | Status: SHIPPED | OUTPATIENT
Start: 2019-08-30

## 2019-08-30 RX ORDER — OXYCODONE HYDROCHLORIDE AND ACETAMINOPHEN 5; 325 MG/1; MG/1
1 TABLET ORAL EVERY 4 HOURS PRN
Qty: 28 TABLET | Refills: 0 | Status: SHIPPED | OUTPATIENT
Start: 2019-08-30 | End: 2019-09-11 | Stop reason: SDUPTHER

## 2019-08-30 RX ORDER — IBUPROFEN 800 MG/1
800 TABLET ORAL EVERY 8 HOURS PRN
Qty: 40 TABLET | Refills: 0 | Status: SHIPPED | OUTPATIENT
Start: 2019-08-30 | End: 2020-03-12

## 2019-08-30 RX ADMIN — Medication 10 ML: at 08:30

## 2019-08-30 RX ADMIN — SODIUM CHLORIDE, POTASSIUM CHLORIDE, SODIUM LACTATE AND CALCIUM CHLORIDE: 600; 310; 30; 20 INJECTION, SOLUTION INTRAVENOUS at 09:52

## 2019-08-30 RX ADMIN — KETOROLAC TROMETHAMINE 30 MG: 30 INJECTION, SOLUTION INTRAMUSCULAR at 01:51

## 2019-08-30 RX ADMIN — HYDROMORPHONE HYDROCHLORIDE 0.5 MG: 2 INJECTION, SOLUTION INTRAMUSCULAR; INTRAVENOUS; SUBCUTANEOUS at 02:51

## 2019-08-30 RX ADMIN — OXYCODONE HYDROCHLORIDE AND ACETAMINOPHEN 2 TABLET: 5; 325 TABLET ORAL at 15:43

## 2019-08-30 RX ADMIN — HYDROCHLOROTHIAZIDE 25 MG: 25 TABLET ORAL at 08:30

## 2019-08-30 RX ADMIN — SODIUM CHLORIDE, POTASSIUM CHLORIDE, SODIUM LACTATE AND CALCIUM CHLORIDE: 600; 310; 30; 20 INJECTION, SOLUTION INTRAVENOUS at 01:55

## 2019-08-30 RX ADMIN — KETOROLAC TROMETHAMINE 30 MG: 30 INJECTION, SOLUTION INTRAMUSCULAR at 08:30

## 2019-08-30 RX ADMIN — DOCUSATE SODIUM 100 MG: 100 CAPSULE, LIQUID FILLED ORAL at 08:40

## 2019-08-30 RX ADMIN — SIMETHICONE 80 MG: 80 TABLET, CHEWABLE ORAL at 15:42

## 2019-08-30 ASSESSMENT — PAIN SCALES - GENERAL
PAINLEVEL_OUTOF10: 5
PAINLEVEL_OUTOF10: 8
PAINLEVEL_OUTOF10: 8
PAINLEVEL_OUTOF10: 5

## 2019-09-04 ENCOUNTER — TELEPHONE (OUTPATIENT)
Dept: OBGYN CLINIC | Age: 39
End: 2019-09-04

## 2019-09-04 DIAGNOSIS — R30.0 DYSURIA: Primary | ICD-10-CM

## 2019-09-04 DIAGNOSIS — R10.9 FLANK PAIN: ICD-10-CM

## 2019-09-04 NOTE — TELEPHONE ENCOUNTER
Order signed. Please have patient continue to monitor for worsening symptoms. RLQ incision was very painful and tender postoperatively while admitted.

## 2019-09-05 LAB
BACTERIA: ABNORMAL /HPF
BILIRUBIN URINE: NEGATIVE
BLOOD, URINE: NEGATIVE
CLARITY: ABNORMAL
COLOR: YELLOW
EPITHELIAL CELLS, UA: 5 /HPF (ref 0–5)
GLUCOSE URINE: NEGATIVE MG/DL
HYALINE CASTS: 1 /LPF (ref 0–8)
KETONES, URINE: NEGATIVE MG/DL
LEUKOCYTE ESTERASE, URINE: ABNORMAL
MICROSCOPIC EXAMINATION: YES
NITRITE, URINE: NEGATIVE
PH UA: 6.5 (ref 5–8)
PROTEIN UA: NEGATIVE MG/DL
RBC UA: 3 /HPF (ref 0–4)
SPECIFIC GRAVITY UA: 1.01 (ref 1–1.03)
UROBILINOGEN, URINE: 0.2 E.U./DL
WBC UA: 5 /HPF (ref 0–5)

## 2019-09-05 ASSESSMENT — ENCOUNTER SYMPTOMS
ABDOMINAL PAIN: 0
VOMITING: 0
NAUSEA: 0
CONSTIPATION: 0
ABDOMINAL DISTENTION: 0
DIARRHEA: 0
SHORTNESS OF BREATH: 0

## 2019-09-05 NOTE — PROGRESS NOTES
Subjective:      Patient ID: Eileen Zhu is a 34 y.o. female. HPI  33 y/o  female presents for ultrasound and follow up secondary to menorrhagia and uterine fibroids. Patient also presents to sign consent for upcoming surgery--Davinci robotic total laparoscopic hysterectomy, bilateral salpingectomy, and possible bilateral oophorectomy. Patient is 9 months s/p colposcopy, cervical conization, ECC and endometrial biopsy secondary to biopsy proven CIN3/HGSIL and CIN2/HGSIL. Menarche occurred age 3. Menstrual cycles have worsened over time. Menses occur every month x 5 days, heavy flow, now 10 (previously 5) pads per day, severe cramps and pain. Recently noted increase in fatigue with menstrual cycles. Patient with known uterine fibroid. Sexually active, no problems, monogamous x 1.5 years. [place] occurred age 13. Diagnosed with PID at age 13.  < 22 lifetime partners. Monogamous from age [age]. No family history of ovarian or breast cancer. Family history significant for uterine cancer in sister requiring hysterectomy. Review of Systems   Constitutional: Positive for fatigue. Negative for activity change, appetite change, chills, fever and unexpected weight change. Respiratory: Negative for shortness of breath. Cardiovascular: Negative for chest pain. Gastrointestinal: Negative for abdominal distention, abdominal pain, constipation, diarrhea, nausea and vomiting. Genitourinary: Positive for menstrual problem. Negative for difficulty urinating, dysuria, genital sores, hematuria, vaginal bleeding, vaginal discharge and vaginal pain. Psychiatric/Behavioral: Negative. Objective:   Physical Exam   Constitutional: She is oriented to person, place, and time. She appears well-developed and well-nourished. No distress. Pulmonary/Chest: Effort normal.   Abdominal: Soft. She exhibits no distension. There is no tenderness. There is no guarding.    Neurological: She is

## 2019-09-06 ENCOUNTER — TELEPHONE (OUTPATIENT)
Dept: OBGYN CLINIC | Age: 39
End: 2019-09-06

## 2019-09-07 LAB
ORGANISM: ABNORMAL
URINE CULTURE, ROUTINE: ABNORMAL
URINE CULTURE, ROUTINE: ABNORMAL

## 2019-09-09 RX ORDER — FLUCONAZOLE 150 MG/1
TABLET ORAL
Qty: 2 TABLET | Refills: 1 | Status: SHIPPED | OUTPATIENT
Start: 2019-09-09

## 2019-09-09 RX ORDER — AMOXICILLIN 500 MG/1
500 CAPSULE ORAL 3 TIMES DAILY
Qty: 21 CAPSULE | Refills: 0 | Status: SHIPPED | OUTPATIENT
Start: 2019-09-09 | End: 2019-09-16

## 2019-09-11 ENCOUNTER — OFFICE VISIT (OUTPATIENT)
Dept: OBGYN CLINIC | Age: 39
End: 2019-09-11

## 2019-09-11 VITALS
SYSTOLIC BLOOD PRESSURE: 132 MMHG | WEIGHT: 211.8 LBS | DIASTOLIC BLOOD PRESSURE: 86 MMHG | TEMPERATURE: 98.9 F | HEART RATE: 72 BPM | BODY MASS INDEX: 41.36 KG/M2

## 2019-09-11 DIAGNOSIS — Z98.890 S/P ROBOT-ASSISTED SURGICAL PROCEDURE: ICD-10-CM

## 2019-09-11 DIAGNOSIS — R30.0 DYSURIA: ICD-10-CM

## 2019-09-11 DIAGNOSIS — E66.01 MORBID OBESITY WITH BMI OF 40.0-44.9, ADULT (HCC): ICD-10-CM

## 2019-09-11 DIAGNOSIS — Z09 POSTOP CHECK: Primary | ICD-10-CM

## 2019-09-11 PROCEDURE — 99024 POSTOP FOLLOW-UP VISIT: CPT | Performed by: OBSTETRICS & GYNECOLOGY

## 2019-09-11 RX ORDER — OXYCODONE HYDROCHLORIDE AND ACETAMINOPHEN 5; 325 MG/1; MG/1
1 TABLET ORAL EVERY 4 HOURS PRN
Qty: 15 TABLET | Refills: 0 | Status: SHIPPED | OUTPATIENT
Start: 2019-09-11 | End: 2019-09-14

## 2019-09-12 LAB
BILIRUBIN URINE: NEGATIVE
BLOOD, URINE: NEGATIVE
CLARITY: CLEAR
COLOR: YELLOW
EPITHELIAL CELLS, UA: 7 /HPF (ref 0–5)
GLUCOSE URINE: NEGATIVE MG/DL
HYALINE CASTS: 4 /LPF (ref 0–8)
KETONES, URINE: NEGATIVE MG/DL
LEUKOCYTE ESTERASE, URINE: NEGATIVE
MICROSCOPIC EXAMINATION: ABNORMAL
NITRITE, URINE: NEGATIVE
PH UA: 6 (ref 5–8)
PROTEIN UA: NEGATIVE MG/DL
RBC UA: 2 /HPF (ref 0–4)
SPECIFIC GRAVITY UA: 1.02 (ref 1–1.03)
UROBILINOGEN, URINE: 0.2 E.U./DL
WBC UA: 3 /HPF (ref 0–5)

## 2019-09-13 LAB — URINE CULTURE, ROUTINE: NORMAL

## 2019-09-22 PROBLEM — D25.2 SUBSEROUS LEIOMYOMA OF UTERUS: Status: RESOLVED | Noted: 2018-07-04 | Resolved: 2019-09-22

## 2019-09-22 PROBLEM — N94.6 DYSMENORRHEA: Status: RESOLVED | Noted: 2019-04-04 | Resolved: 2019-09-22

## 2019-09-22 PROBLEM — N92.0 MENORRHAGIA WITH REGULAR CYCLE: Status: RESOLVED | Noted: 2019-04-04 | Resolved: 2019-09-22

## 2019-09-22 ASSESSMENT — ENCOUNTER SYMPTOMS
NAUSEA: 0
VOMITING: 0
ABDOMINAL PAIN: 1
DIARRHEA: 0
SHORTNESS OF BREATH: 0

## 2019-09-23 ENCOUNTER — TELEPHONE (OUTPATIENT)
Dept: OBGYN CLINIC | Age: 39
End: 2019-09-23

## 2019-10-08 ENCOUNTER — OFFICE VISIT (OUTPATIENT)
Dept: OBGYN CLINIC | Age: 39
End: 2019-10-08

## 2019-10-08 ENCOUNTER — TELEPHONE (OUTPATIENT)
Dept: OBGYN CLINIC | Age: 39
End: 2019-10-08

## 2019-10-08 VITALS
WEIGHT: 207 LBS | DIASTOLIC BLOOD PRESSURE: 74 MMHG | SYSTOLIC BLOOD PRESSURE: 124 MMHG | TEMPERATURE: 98.6 F | HEART RATE: 70 BPM | BODY MASS INDEX: 40.43 KG/M2

## 2019-10-08 DIAGNOSIS — Z09 POSTOP CHECK: Primary | ICD-10-CM

## 2019-10-08 DIAGNOSIS — E66.01 MORBID OBESITY WITH BMI OF 40.0-44.9, ADULT (HCC): ICD-10-CM

## 2019-10-08 DIAGNOSIS — R10.2 CYCLICAL PELVIC PAIN: ICD-10-CM

## 2019-10-08 DIAGNOSIS — Z87.410 HISTORY OF CERVICAL DYSPLASIA: ICD-10-CM

## 2019-10-08 DIAGNOSIS — R10.2 PELVIC PAIN IN FEMALE: ICD-10-CM

## 2019-10-08 DIAGNOSIS — Z98.890 S/P ROBOT-ASSISTED SURGICAL PROCEDURE: ICD-10-CM

## 2019-10-08 PROCEDURE — 99024 POSTOP FOLLOW-UP VISIT: CPT | Performed by: OBSTETRICS & GYNECOLOGY

## 2019-10-08 ASSESSMENT — ENCOUNTER SYMPTOMS
SHORTNESS OF BREATH: 0
ABDOMINAL PAIN: 1
ABDOMINAL DISTENTION: 0
DIARRHEA: 0
VOMITING: 0
NAUSEA: 0
CONSTIPATION: 0

## 2019-10-11 ENCOUNTER — TELEPHONE (OUTPATIENT)
Dept: OBGYN CLINIC | Age: 39
End: 2019-10-11

## 2020-02-26 ENCOUNTER — OFFICE VISIT (OUTPATIENT)
Dept: OBGYN CLINIC | Age: 40
End: 2020-02-26
Payer: COMMERCIAL

## 2020-02-26 VITALS
TEMPERATURE: 99.1 F | WEIGHT: 218 LBS | BODY MASS INDEX: 42.58 KG/M2 | SYSTOLIC BLOOD PRESSURE: 162 MMHG | HEART RATE: 82 BPM | DIASTOLIC BLOOD PRESSURE: 88 MMHG

## 2020-02-26 LAB
BILIRUBIN URINE: NEGATIVE
BLOOD, URINE: NEGATIVE
CLARITY: CLEAR
COLOR: YELLOW
GLUCOSE URINE: NEGATIVE MG/DL
KETONES, URINE: NEGATIVE MG/DL
LEUKOCYTE ESTERASE, URINE: NEGATIVE
MICROSCOPIC EXAMINATION: NORMAL
NITRITE, URINE: NEGATIVE
PH UA: 6 (ref 5–8)
PROTEIN UA: NEGATIVE MG/DL
SPECIFIC GRAVITY UA: 1.02 (ref 1–1.03)
URINE TYPE: NORMAL
UROBILINOGEN, URINE: 0.2 E.U./DL

## 2020-02-26 PROCEDURE — 81003 URINALYSIS AUTO W/O SCOPE: CPT | Performed by: OBSTETRICS & GYNECOLOGY

## 2020-02-26 PROCEDURE — 99213 OFFICE O/P EST LOW 20 MIN: CPT | Performed by: OBSTETRICS & GYNECOLOGY

## 2020-02-27 LAB
C TRACH DNA GENITAL QL NAA+PROBE: NEGATIVE
CANDIDA SPECIES, DNA PROBE: NORMAL
GARDNERELLA VAGINALIS, DNA PROBE: NORMAL
N. GONORRHOEAE DNA: NEGATIVE
TRICHOMONAS VAGINALIS DNA: NORMAL

## 2020-02-28 ENCOUNTER — TELEPHONE (OUTPATIENT)
Dept: OBGYN CLINIC | Age: 40
End: 2020-02-28

## 2020-02-28 LAB — URINE CULTURE, ROUTINE: NORMAL

## 2020-02-29 ASSESSMENT — ENCOUNTER SYMPTOMS
SHORTNESS OF BREATH: 0
DIARRHEA: 0
ABDOMINAL PAIN: 0
NAUSEA: 0
VOMITING: 0
CONSTIPATION: 0

## 2020-02-29 NOTE — PROGRESS NOTES
Subjective:      Patient ID: Lazaro Tellez is a 37 y.o. female. HPI  36 y/o  female presents for evaluation secondary to recent vaginal discharge. Patient requests STD testing as well. Patient is 6 months S/P Da Rajani robotic total laparoscopic hysterectomy  and bilateral salpingectomy secondary to menorrhagia with regular cycle, dysmenorrhea, uterine fibroid, enlarged uterus and history of cervical dysplasia. Denies vaginal bleeding. Continues to note cyclical central abdominal pain similar to menstrual discomfort. Review of Systems   Constitutional: Negative. Negative for activity change, appetite change, chills, fatigue, fever and unexpected weight change. Respiratory: Negative for shortness of breath. Cardiovascular: Negative for chest pain. Gastrointestinal: Negative for abdominal pain, constipation, diarrhea, nausea and vomiting. Genitourinary: Positive for menstrual problem (?) and vaginal discharge. Negative for difficulty urinating, dysuria, hematuria, pelvic pain, vaginal bleeding and vaginal pain. Psychiatric/Behavioral: Negative. Objective:   Physical Exam  Vitals signs and nursing note reviewed. Constitutional:       General: She is not in acute distress. Appearance: She is well-developed. She is not diaphoretic. Pulmonary:      Effort: Pulmonary effort is normal.   Skin:     General: Skin is warm and dry. Neurological:      Mental Status: She is alert and oriented to person, place, and time. Psychiatric:         Behavior: Behavior normal.         Thought Content: Thought content normal.         Judgment: Judgment normal.         Assessment:       Diagnosis Orders   1. Vaginal discharge  C.trachomatis N.gonorrhoeae DNA    Vaginal Pathogens Probes *A   2. Screening for STD (sexually transmitted disease)  C.trachomatis N.gonorrhoeae DNA    Vaginal Pathogens Probes *A   3. Pelvic pain in female  URINALYSIS    URINE CULTURE   4.  S/P robot-assisted surgical procedure     5. Morbid obesity with BMI of 40.0-44.9, adult ([facility]     6. History of cervical dysplasia             Plan:      Orders Placed This Encounter   Procedures    C.trachomatis N.gonorrhoeae DNA    URINE CULTURE    Vaginal Pathogens Probes *A    URINALYSIS     Follow up in 6-8 months for well woman examination and pap smear.           Cora Underwood DO

## 2020-03-12 ENCOUNTER — OFFICE VISIT (OUTPATIENT)
Dept: FAMILY MEDICINE CLINIC | Age: 40
End: 2020-03-12
Payer: COMMERCIAL

## 2020-03-12 VITALS
RESPIRATION RATE: 18 BRPM | SYSTOLIC BLOOD PRESSURE: 140 MMHG | WEIGHT: 218 LBS | BODY MASS INDEX: 42.8 KG/M2 | DIASTOLIC BLOOD PRESSURE: 95 MMHG | OXYGEN SATURATION: 98 % | HEART RATE: 84 BPM | HEIGHT: 60 IN

## 2020-03-12 PROBLEM — Z00.01 ENCOUNTER FOR GENERAL ADULT MEDICAL EXAMINATION WITH ABNORMAL FINDINGS: Status: ACTIVE | Noted: 2020-03-12

## 2020-03-12 LAB
A/G RATIO: 1.1 (ref 1.1–2.2)
ALBUMIN SERPL-MCNC: 4.1 G/DL (ref 3.4–5)
ALP BLD-CCNC: 65 U/L (ref 40–129)
ALT SERPL-CCNC: 14 U/L (ref 10–40)
ANION GAP SERPL CALCULATED.3IONS-SCNC: 14 MMOL/L (ref 3–16)
AST SERPL-CCNC: 14 U/L (ref 15–37)
BACTERIA: ABNORMAL /HPF
BILIRUB SERPL-MCNC: <0.2 MG/DL (ref 0–1)
BILIRUBIN URINE: NEGATIVE
BLOOD, URINE: NEGATIVE
BUN BLDV-MCNC: 16 MG/DL (ref 7–20)
CALCIUM SERPL-MCNC: 9.1 MG/DL (ref 8.3–10.6)
CHLORIDE BLD-SCNC: 103 MMOL/L (ref 99–110)
CHOLESTEROL, TOTAL: 193 MG/DL (ref 0–199)
CLARITY: ABNORMAL
CO2: 22 MMOL/L (ref 21–32)
COLOR: YELLOW
CREAT SERPL-MCNC: 0.7 MG/DL (ref 0.6–1.1)
EPITHELIAL CELLS, UA: 9 /HPF (ref 0–5)
GFR AFRICAN AMERICAN: >60
GFR NON-AFRICAN AMERICAN: >60
GLOBULIN: 3.6 G/DL
GLUCOSE BLD-MCNC: 81 MG/DL (ref 70–99)
GLUCOSE URINE: NEGATIVE MG/DL
HCT VFR BLD CALC: 37.5 % (ref 36–48)
HDLC SERPL-MCNC: 47 MG/DL (ref 40–60)
HEMOGLOBIN: 12.2 G/DL (ref 12–16)
HYALINE CASTS: 1 /LPF (ref 0–8)
KETONES, URINE: NEGATIVE MG/DL
LDL CHOLESTEROL CALCULATED: 126 MG/DL
LEUKOCYTE ESTERASE, URINE: NEGATIVE
MCH RBC QN AUTO: 26.3 PG (ref 26–34)
MCHC RBC AUTO-ENTMCNC: 32.5 G/DL (ref 31–36)
MCV RBC AUTO: 80.8 FL (ref 80–100)
MICROSCOPIC EXAMINATION: YES
NITRITE, URINE: NEGATIVE
PDW BLD-RTO: 15.2 % (ref 12.4–15.4)
PH UA: 6 (ref 5–8)
PLATELET # BLD: 407 K/UL (ref 135–450)
PMV BLD AUTO: 7.4 FL (ref 5–10.5)
POTASSIUM SERPL-SCNC: 4.2 MMOL/L (ref 3.5–5.1)
PROTEIN UA: NEGATIVE MG/DL
RBC # BLD: 4.64 M/UL (ref 4–5.2)
RBC UA: 1 /HPF (ref 0–4)
SODIUM BLD-SCNC: 139 MMOL/L (ref 136–145)
SPECIFIC GRAVITY UA: 1.01 (ref 1–1.03)
TOTAL PROTEIN: 7.7 G/DL (ref 6.4–8.2)
TRIGL SERPL-MCNC: 102 MG/DL (ref 0–150)
URINE TYPE: ABNORMAL
UROBILINOGEN, URINE: 0.2 E.U./DL
VLDLC SERPL CALC-MCNC: 20 MG/DL
WBC # BLD: 8.1 K/UL (ref 4–11)
WBC UA: 1 /HPF (ref 0–5)

## 2020-03-12 PROCEDURE — 36415 COLL VENOUS BLD VENIPUNCTURE: CPT | Performed by: INTERNAL MEDICINE

## 2020-03-12 PROCEDURE — 99396 PREV VISIT EST AGE 40-64: CPT | Performed by: INTERNAL MEDICINE

## 2020-03-12 RX ORDER — HYDROCHLOROTHIAZIDE 25 MG/1
TABLET ORAL
Qty: 90 TABLET | Refills: 1 | Status: SHIPPED | OUTPATIENT
Start: 2020-03-12

## 2020-03-12 RX ORDER — IBUPROFEN 600 MG/1
TABLET ORAL
Qty: 90 TABLET | Refills: 1 | Status: SHIPPED | OUTPATIENT
Start: 2020-03-12 | End: 2021-03-05 | Stop reason: SDUPTHER

## 2020-03-12 ASSESSMENT — PATIENT HEALTH QUESTIONNAIRE - PHQ9
2. FEELING DOWN, DEPRESSED OR HOPELESS: 0
1. LITTLE INTEREST OR PLEASURE IN DOING THINGS: 0
SUM OF ALL RESPONSES TO PHQ QUESTIONS 1-9: 0
SUM OF ALL RESPONSES TO PHQ QUESTIONS 1-9: 0
SUM OF ALL RESPONSES TO PHQ9 QUESTIONS 1 & 2: 0

## 2020-03-12 NOTE — PATIENT INSTRUCTIONS
Patient Education        DASH Diet: Care Instructions  Your Care Instructions    The DASH diet is an eating plan that can help lower your blood pressure. DASH stands for Dietary Approaches to Stop Hypertension. Hypertension is high blood pressure. The DASH diet focuses on eating foods that are high in calcium, potassium, and magnesium. These nutrients can lower blood pressure. The foods that are highest in these nutrients are fruits, vegetables, low-fat dairy products, nuts, seeds, and legumes. But taking calcium, potassium, and magnesium supplements instead of eating foods that are high in those nutrients does not have the same effect. The DASH diet also includes whole grains, fish, and poultry. The DASH diet is one of several lifestyle changes your doctor may recommend to lower your high blood pressure. Your doctor may also want you to decrease the amount of sodium in your diet. Lowering sodium while following the DASH diet can lower blood pressure even further than just the DASH diet alone. Follow-up care is a key part of your treatment and safety. Be sure to make and go to all appointments, and call your doctor if you are having problems. It's also a good idea to know your test results and keep a list of the medicines you take. How can you care for yourself at home? Following the DASH diet  · Eat 4 to 5 servings of fruit each day. A serving is 1 medium-sized piece of fruit, ½ cup chopped or canned fruit, 1/4 cup dried fruit, or 4 ounces (½ cup) of fruit juice. Choose fruit more often than fruit juice. · Eat 4 to 5 servings of vegetables each day. A serving is 1 cup of lettuce or raw leafy vegetables, ½ cup of chopped or cooked vegetables, or 4 ounces (½ cup) of vegetable juice. Choose vegetables more often than vegetable juice. · Get 2 to 3 servings of low-fat and fat-free dairy each day. A serving is 8 ounces of milk, 1 cup of yogurt, or 1 ½ ounces of cheese. · Eat 6 to 8 servings of grains each day. meals using beans and peas. Add garbanzo or kidney beans to salads. Make burritos and tacos with mashed stoner beans or black beans. Where can you learn more? Go to https://anjel.[employer]. org and sign in to your [employer] account. Enter C152 in the [employer] box to learn more about \"DASH Diet: Care Instructions. \"     If you do not have an account, please click on the \"Sign Up Now\" link. Current as of: April 9, 2019  Content Version: 12.3  © 5445-8947 [employer]. Care instructions adapted under license by [email or web address] ([facility]). If you have questions about a medical condition or this instruction, always ask your healthcare professional. [address] any warranty or liability for your use of this information. Patient Education        Learning About the Low FODMAP Diet for Irritable Bowel Syndrome (IBS)  What is the low-FODMAP diet? A low-FODMAP diet is a way to find out what foods give you digestion problems. You stop eating certain high-FODMAP foods for about 2 months. Then you add them back to see how your body reacts. This is called a \"challenge diet. \" A dietitian or doctor can help you follow this diet. FODMAPs are carbohydrates. They are in many types of foods. FODMAP stands for:  · F ermentable. · O ligosaccharides. · D isaccharides. · M onosaccharides. · A nd p olyols. If you have digestive problems, some of these foods can make your symptoms worse. When you are on this diet, you can still eat certain fruits and vegetables. You can also eat certain grains, meats, fish, and lactose-free milks. What is it used for? If you have irritable bowel syndrome (IBS), you can ease your symptoms by not eating some types of foods. Some people also use this diet for inflammatory bowel disease (IBD) or some food intolerances. High-FODMAP foods can be hard to digest. They pull more fluid into your intestines. They are also easily fermented.  This can lead Bananas, blueberries, cantaloupe, coconut, grapes, and honeydew. Kiwi, karina, limes, oranges, passion fruit, papaya, and pineapple are also okay. You can eat plantain, raspberries, rhubarb, star fruit, strawberries, tangelo, and tangerine. You can also have small amounts of dried banana chips (up to 10 chips), dried cranberries (1 Tbsp), and shredded coconut (up to ¼ cup). Avoid: Apples, applesauce, apricots, avocados, blackberries, boysenberries, and cherries. Also avoid dates, figs, grapefruit, guava, lychee, and mangoes. Don't eat nectarines, peaches, pears, persimmon, plums, prunes, tamarillo, or watermelon. And limit most canned and dried fruits. Oils, spices, condiments, and sweeteners  Okay to eat: Vegetable oils (including garlic infused), butter, ghee, lard, and margarine (no trans fat). You can have most fresh herbs like basil, chives, coriander, kenji, parsley, rosemary and thyme. You can have salt, jams made from low-FODMAP fruits, mayonnaise, and mustard. Soy sauce, hot sauce (no garlic), tamari, and vinegar are also okay. Sweeteners that are okay include sugar (sucrose), powdered (confectioner's) sugar, brown sugar, glucose, and maple syrup. You can also have some artificial sweeteners like aspartame, saccharine, and stevia. Avoid: Chutneys, hummus, jellies, garlic sauces, and gravies made with onion or garlic. Avoid pickles, relish, some salad dressings and soup stocks, salsa, and tomato paste. And avoid sauces and other foods with high fructose corn syrup, honey, molasses, and agave. Avoid artificial sweeteners (isomalt, mannitol, malitol, sorbitol, and xylitol). Avoid corn syrup solids, fructose, fruit juice concentrate, and polydextrose. Other foods and drinks  Okay to have: Water, soda water, tonic, soft drinks sweetened with sugar, ½ cup of low-FODMAP fruit juice, and most teas and alcohols. You can also eat foods made with baking powder and soda, cocoa, and gelatin.   Avoid: Juices from high-FODMAP fruits and vegetables. And avoid fortified beto, chamomile and fennel teas, chicory-based drinks and coffee substitutes, and bouillon cubes. Follow-up care is a key part of your treatment and safety. Be sure to make and go to all appointments, and call your doctor if you are having problems. It's also a good idea to know your test results and keep a list of the medicines you take. Where can you learn more? Go to https://[employer]. org and sign in to your [employer] account. Enter L235 in the [facility] box to learn more about \"Learning About the Low FODMAP Diet for Irritable Bowel Syndrome (IBS). \"     If you do not have an account, please click on the \"Sign Up Now\" link. Current as of: August 21, 2019  Content Version: 12.3  © 9362-7224 Healthwise, [employer]. Care instructions adapted under license by [facility] ([facility]). If you have questions about a medical condition or this instruction, always ask your healthcare professional. [address] any warranty or liability for your use of this information. daily water pill  Goal B{P 130/80  Continue nice exercise  Try irritable bowel diet  Call nutritionist if interest  If interest in sugery attend free class  BP check in 2 weeks on medication.  If high will recommend a change in medication

## 2020-03-12 NOTE — PROGRESS NOTES
apnea. Denies any wheezing. No chest pain palpitations. Hypertension lower extremity edema. Rare GE reflux no bloody stools. Positive bloating and gas. Right knee pain.   Intermittent lower extremity edema.       Wt Readings from Last 3 Encounters:   02/26/20 218 lb (98.9 kg)   10/08/19 207 lb (93.9 kg)   09/11/19 211 lb 12.8 oz (96.1 kg)     BP Readings from Last 3 Encounters:   02/26/20 (!) 162/88   10/08/19 124/74   09/11/19 132/86       Patient Active Problem List   Diagnosis    Hypertension    Morbid obesity with BMI of 40.0-44.9, adult ([facility]    History of cervical dysplasia    Hidradenitis suppurativa    Hyperlipidemia    Iron deficiency    S/P robot-assisted surgical procedure       Preventive Care:  Health Maintenance   Topic Date Due    Flu vaccine (1) Has had     Potassium monitoring  08/26/2020    Creatinine monitoring  08/26/2020    Lipid screen  08/26/2024    DTaP/Tdap/Td vaccine (3 - Td) 03/31/2026    Shingles Vaccine (1 of 2) 12/26/2028    HIV screen  Completed    Hepatitis A vaccine  Aged Out    Hepatitis B vaccine  Aged Out    Hib vaccine  Aged Out    Meningococcal (ACWY) vaccine  Aged Out    Pneumococcal 0-64 years Vaccine  Aged Out      Hx abnormal PAP: hysterectomy  Sexual activity:yes  Self-breast exams: sometimes  Previous DEXA scan:  Last eye exam: 2019 normal   l\"Exercise: ellipical  3 times weekly 60   Seatbelt use: yes  Lipid panel:   Lab Results   Component Value Date    CHOL 188 08/26/2019    TRIG 89 08/26/2019    HDL 46 08/26/2019    LDLCALC 124 (H) 08/26/2019        Living will:[id number]}    Immunization History   Administered Date(s) Administered    Hepatitis B 04/05/2016, 05/06/2016    Influenza Virus Vaccine 12/05/2018    PPD Test 03/30/2015, 04/07/2015, 03/21/2016, 03/29/2016    Tdap (Boostrix, Adacel) 07/14/2009, 03/31/2016       Allergies   Allergen Reactions    Ace Inhibitors      cough     Outpatient Medications Marked as Taking for the 3/12/20 encounter (Office Visit) with Rahel Whitlock MD   Medication Sig Dispense Refill    hydroCHLOROthiazide (HYDRODIURIL) 25 MG tablet TAKE ONE TABLET BY MOUTH ONE TIME A DAY 90 tablet 1       Past Medical History:   Diagnosis Date    Arthritis     Hyperlipidemia 12/19/2018    Hypertension     Iron deficiency 8/26/2019    Morbid obesity with BMI of 40.0-44.9, adult ([address] 5/28/2018     Past Surgical History:   Procedure Laterality Date    HYSTERECTOMY ABDOMINAL Bilateral 8/29/2019    DAVINCI ROBOTIC TOTAL LAPAROSCOPIC HYSTERECTOMY WITH BILATERAL SALPINGECTOMY performed by Mena Jacobson DO at [address] N/A 7/27/2018    CERVICAL COLD KNIFE CONIZATION, COLPOSCOPY UNDER ANESTHESIA performed by Mena Jacobson DO at [address] [facility]      TUBAL LIGATION  2001     Family History   Problem Relation Age of Onset    Diabetes Mother     High Blood Pressure Mother     Cancer Father         pancreatic         Physical Exam:   Vitals:    03/12/20 1534   BP: (!) 161/77   Pulse: 84   Resp: 18   SpO2: 98%   Weight: 218 lb (98.9 kg)   Height: 5' (1.524 m)     Body mass index is 42.58 kg/m². Constitutional: She is oriented to person, place, and time. She appears well-developed and well-nourished. No distress. HEENT: Proptosis. Good dentition. Mildly crowded hypopharynx. TMs unremarkable. Good upstroke of the carotids. Full neck. No adenopathy. Lungs are clear. Good LAITH ratio without any wheezes rales or rhonchi. Cardiovascular exam 1 out of 6 murmur left upper sternal border no radiation to the axilla. Regular rate and rhythm. No gallop or rub. Abdomen is obese. Well-healed surgical scars. No hepatosplenomegaly noted. Macromastia. No mass. No discharge. Tender diffusely. No erythema or warmth. Good range of motion of the hips. Crepitus of right knee. Trace lower extremity. Good pulses feet. Sensation intact.   No suspicious skin lesions or

## 2020-03-13 LAB
HIV AG/AB: NORMAL
HIV ANTIGEN: NORMAL
HIV-1 ANTIBODY: NORMAL
HIV-2 AB: NORMAL
TOTAL SYPHILLIS IGG/IGM: NORMAL

## 2020-04-07 PROBLEM — E61.1 IRON DEFICIENCY: Status: RESOLVED | Noted: 2019-08-26 | Resolved: 2020-04-07

## 2020-04-11 PROBLEM — Z00.01 ENCOUNTER FOR GENERAL ADULT MEDICAL EXAMINATION WITH ABNORMAL FINDINGS: Status: RESOLVED | Noted: 2020-03-12 | Resolved: 2020-04-11

## 2020-05-04 ENCOUNTER — OFFICE VISIT (OUTPATIENT)
Dept: FAMILY MEDICINE CLINIC | Age: 40
End: 2020-05-04
Payer: COMMERCIAL

## 2020-05-04 VITALS
BODY MASS INDEX: 43.16 KG/M2 | RESPIRATION RATE: 16 BRPM | SYSTOLIC BLOOD PRESSURE: 179 MMHG | WEIGHT: 221 LBS | HEART RATE: 80 BPM | DIASTOLIC BLOOD PRESSURE: 120 MMHG

## 2020-05-04 LAB
BILIRUBIN, POC: ABNORMAL
BLOOD URINE, POC: ABNORMAL
CLARITY, POC: ABNORMAL
COLOR, POC: YELLOW
GLUCOSE URINE, POC: ABNORMAL
KETONES, POC: ABNORMAL
LEUKOCYTE EST, POC: ABNORMAL
NITRITE, POC: ABNORMAL
PH, POC: 6
PROTEIN, POC: ABNORMAL
SPECIFIC GRAVITY, POC: 1.02
UROBILINOGEN, POC: 0.2

## 2020-05-04 PROCEDURE — 99214 OFFICE O/P EST MOD 30 MIN: CPT | Performed by: INTERNAL MEDICINE

## 2020-05-04 PROCEDURE — 81002 URINALYSIS NONAUTO W/O SCOPE: CPT | Performed by: INTERNAL MEDICINE

## 2020-05-04 RX ORDER — SULFAMETHOXAZOLE AND TRIMETHOPRIM 800; 160 MG/1; MG/1
1 TABLET ORAL 2 TIMES DAILY
Qty: 6 TABLET | Refills: 0 | Status: SHIPPED | OUTPATIENT
Start: 2020-05-04 | End: 2020-05-07

## 2020-05-04 ASSESSMENT — ENCOUNTER SYMPTOMS
ABDOMINAL PAIN: 0
VOMITING: 0
NAUSEA: 0

## 2020-05-04 NOTE — PROGRESS NOTES
Vitals:    05/04/20 1033 05/04/20 1116   BP: (!) 165/100 (!) 179/120  Comment: Pt has not taken medication yet   Pulse: 80 80   Resp: 16    Weight: 221 lb (100.2 kg)      Estimated body mass index is 43.16 kg/m² as calculated from the following:    Height as of 3/12/20: 5' (1.524 m). Weight as of this encounter: 221 lb (100.2 kg). Physical Exam  Constitutional:       Appearance: Normal appearance. Abdominal:      General: Abdomen is flat. There is no distension. Palpations: Abdomen is soft. There is no mass. Tenderness: There is no abdominal tenderness. There is no right CVA tenderness, left CVA tenderness, guarding or rebound. Neurological:      Mental Status: She is alert. ASSESSMENT/PLAN:  1. Dysuria  We will treat with antibiotics. She will call if symptoms do not improve or worsen in any way  - Neisseria gonorrhoeae DNA, Urine  - Chlamydia trachomatis DNA, Urine  - Culture, Urine  - POCT Urinalysis no Micro    2. Routine screening for STI (sexually transmitted infection)  Per patient request  - Neisseria gonorrhoeae DNA, Urine  - Chlamydia trachomatis DNA, Urine    3. Hypertension, unspecified type  She is advised to take her blood pressure medication today. She will check her blood pressure readings at home with notify if still elevated, she may need additional therapy      Return if symptoms worsen or fail to improve. An electronic signature was used to authenticate this note.     --Yeyo Burks MD on 5/4/2020 at 11:40 AM

## 2020-05-05 LAB
C. TRACHOMATIS DNA ,URINE: NEGATIVE
N. GONORRHOEAE DNA, URINE: NEGATIVE

## 2020-05-06 LAB
ORGANISM: ABNORMAL
URINE CULTURE, ROUTINE: ABNORMAL

## 2020-12-22 ENCOUNTER — TELEPHONE (OUTPATIENT)
Dept: FAMILY MEDICINE CLINIC | Age: 40
End: 2020-12-22

## 2020-12-22 NOTE — TELEPHONE ENCOUNTER
Left vm for pt to return call regarding message.
Needs ov apt. 420 tomorrow fine ( 40 min).  Ok to use heat, ice tylenol in the meantime
Expert  click yes below to proceed with [employer] As Usual   Scheduling)?  Yes

## 2021-01-27 ENCOUNTER — TELEPHONE (OUTPATIENT)
Dept: FAMILY MEDICINE CLINIC | Age: 41
End: 2021-01-27

## 2021-03-05 ENCOUNTER — TELEPHONE (OUTPATIENT)
Dept: OBGYN CLINIC | Age: 41
End: 2021-03-05

## 2021-03-05 DIAGNOSIS — Z98.890 S/P CONIZATION OF CERVIX: ICD-10-CM

## 2021-03-05 RX ORDER — IBUPROFEN 600 MG/1
TABLET ORAL
Qty: 90 TABLET | Refills: 1 | Status: SHIPPED | OUTPATIENT
Start: 2021-03-05 | End: 2021-08-18 | Stop reason: SDUPTHER

## 2021-03-05 NOTE — TELEPHONE ENCOUNTER
Called ibuprofen script to [facility] [phone] as patient is not using [facility] out patient pharmacy.

## 2021-03-05 NOTE — TELEPHONE ENCOUNTER
Patient is requesting a refill of ibprofren 800's she said that shes been very achy lately and Dr. Levon Shen has prescribed these in the past.  I informed patient that I would send Dr. Levon Shen a message to see if she was able to refill them and will call her back.

## 2021-04-19 ENCOUNTER — OFFICE VISIT (OUTPATIENT)
Dept: OBGYN CLINIC | Age: 41
End: 2021-04-19
Payer: COMMERCIAL

## 2021-04-19 VITALS
HEART RATE: 68 BPM | BODY MASS INDEX: 42.21 KG/M2 | TEMPERATURE: 97.3 F | HEIGHT: 60 IN | SYSTOLIC BLOOD PRESSURE: 136 MMHG | WEIGHT: 215 LBS | DIASTOLIC BLOOD PRESSURE: 88 MMHG

## 2021-04-19 DIAGNOSIS — Z12.31 ENCOUNTER FOR SCREENING MAMMOGRAM FOR MALIGNANT NEOPLASM OF BREAST: ICD-10-CM

## 2021-04-19 DIAGNOSIS — E34.9 HORMONE DISTURBANCE: ICD-10-CM

## 2021-04-19 DIAGNOSIS — E89.41 SURGICAL MENOPAUSE, SYMPTOMATIC: ICD-10-CM

## 2021-04-19 DIAGNOSIS — H05.20 EXOPHTHALMOS: ICD-10-CM

## 2021-04-19 DIAGNOSIS — Z11.3 SCREENING EXAMINATION FOR STD (SEXUALLY TRANSMITTED DISEASE): ICD-10-CM

## 2021-04-19 DIAGNOSIS — Z87.410 HISTORY OF CERVICAL DYSPLASIA: ICD-10-CM

## 2021-04-19 DIAGNOSIS — Z01.419 WOMEN'S ANNUAL ROUTINE GYNECOLOGICAL EXAMINATION: Primary | ICD-10-CM

## 2021-04-19 PROCEDURE — 99396 PREV VISIT EST AGE 40-64: CPT | Performed by: OBSTETRICS & GYNECOLOGY

## 2021-04-19 PROCEDURE — 36415 COLL VENOUS BLD VENIPUNCTURE: CPT | Performed by: OBSTETRICS & GYNECOLOGY

## 2021-04-20 ENCOUNTER — TELEPHONE (OUTPATIENT)
Dept: OBGYN CLINIC | Age: 41
End: 2021-04-20

## 2021-04-20 DIAGNOSIS — Z11.3 SCREEN FOR STD (SEXUALLY TRANSMITTED DISEASE): Primary | ICD-10-CM

## 2021-04-20 DIAGNOSIS — E34.9 HORMONE DISTURBANCE: ICD-10-CM

## 2021-04-20 LAB
FOLLICLE STIMULATING HORMONE: 6 MIU/ML
HIV AG/AB: NORMAL
HIV ANTIGEN: NORMAL
HIV-1 ANTIBODY: NORMAL
HIV-2 AB: NORMAL
REASON FOR REJECTION: NORMAL
REJECTED TEST: NORMAL
T4 FREE: 1.3 NG/DL (ref 0.9–1.8)
TOTAL SYPHILLIS IGG/IGM: NORMAL
TSH SERPL DL<=0.05 MIU/L-ACNC: 1.74 UIU/ML (ref 0.27–4.2)

## 2021-04-20 NOTE — TELEPHONE ENCOUNTER
[facility] calling. Estrogen and Hep C test are rejected due to insufficient quantity. 1.5ml of serum was needed. Tset not done.

## 2021-04-21 LAB
SEX HORMONE BINDING GLOBULIN: 65 NMOL/L (ref 30–135)
TESTOSTERONE FREE-NONMALE: 2.4 PG/ML (ref 1.1–5.8)
TESTOSTERONE TOTAL: 21 NG/DL (ref 20–70)

## 2021-04-21 ASSESSMENT — ENCOUNTER SYMPTOMS
CONSTIPATION: 0
DIARRHEA: 0
ABDOMINAL PAIN: 1
NAUSEA: 0
SHORTNESS OF BREATH: 0
ABDOMINAL DISTENTION: 0
VOMITING: 0

## 2021-04-21 NOTE — PROGRESS NOTES
Subjective:      Patient ID: Jake Jerry is a 41 y.o. female. HPI  40 y/o  female presents for well woman examination. Last pap smear was 3/29/19--normal.  History is positive for cervcial dysplasia. Had cervical conization, ECC and endometrial biopsy on 18 secondary to RAFFY 3/HGSIL and RAFFY 2/HGSIL. In addition, history is positive for Davinci robotic total laparoscopic hysterectomy, bilateral salpingectomy on 19 secondary to menorrhagia, dysmenorrhea, uterine fibroid, enlarged uterus and history of cervical dysplasia. Denies vaginal bleeding. Admits to cyclical diffuse abdominal pain. Pain is achy and lasts 7 days. Denies cramps. Pain is associated with breast tenderness and is similar to menstrual symptoms in the past. This month has been better. Filled ibuprofen 800 mg in March to treat pain. Sexually active, no problems, monogamous. Menarche occurred age 3. [place] occurred age 13. Diagnosed with PID at age 13.  < 22 lifetime partners. Monogamous from age [age]. No family history of ovarian or breast cancer. Family history significant for uterine cancer in sister requiring hysterectomy. Review of Systems   Constitutional: Negative for activity change, appetite change, chills, fatigue, fever and unexpected weight change. Respiratory: Negative for shortness of breath. Cardiovascular: Negative for chest pain. Gastrointestinal: Positive for abdominal pain. Negative for abdominal distention, constipation, diarrhea, nausea and vomiting. Endocrine: Negative for cold intolerance and heat intolerance. Genitourinary: Positive for pelvic pain. Negative for difficulty urinating, dyspareunia, dysuria, frequency, hematuria, urgency, vaginal bleeding, vaginal discharge and vaginal pain. Skin: Negative for rash. Neurological: Negative for headaches. Hematological: Does not bruise/bleed easily.        Objective:   Physical Exam  Vitals signs and nursing note reviewed. Exam conducted with a chaperone present. Constitutional:       General: She is not in acute distress. Appearance: Normal appearance. She is well-developed. She is not ill-appearing or toxic-appearing. HENT:      Head: Normocephalic and atraumatic. Eyes:      Extraocular Movements: Extraocular movements intact. Comments: exopthalmos   Neck:      Musculoskeletal: Neck supple. Thyroid: No thyromegaly. Trachea: Trachea normal.   Cardiovascular:      Rate and Rhythm: Normal rate and regular rhythm. Heart sounds: Normal heart sounds, S1 normal and S2 normal.   Pulmonary:      Effort: Pulmonary effort is normal. No respiratory distress. Breath sounds: Normal breath sounds. Chest:      Breasts: Breasts are symmetrical.         Right: No inverted nipple, mass, nipple discharge, skin change or tenderness. Left: No inverted nipple, mass, nipple discharge, skin change or tenderness. Abdominal:      General: Bowel sounds are normal. There is no distension. Palpations: Abdomen is soft. Abdomen is not rigid. There is no mass. Tenderness: There is no abdominal tenderness. There is no guarding or rebound. Genitourinary:     General: Normal vulva. Exam position: Lithotomy position. Labia:         Right: No rash, tenderness, lesion or injury. Left: No rash, tenderness, lesion or injury. Vagina: No signs of injury. No vaginal discharge, erythema, tenderness or bleeding. Uterus: Absent. Adnexa:         Right: No mass, tenderness or fullness. Left: No mass, tenderness or fullness. Musculoskeletal: Normal range of motion. Skin:     General: Skin is warm and dry. Findings: No erythema or rash. Nails: There is no clubbing. Neurological:      Mental Status: She is alert and oriented to person, place, and time.    Psychiatric:         Speech: Speech normal.         Behavior: Behavior normal. Behavior is cooperative. Thought Content: Thought content normal.         Judgment: Judgment normal.         Assessment:       Diagnosis Orders   1. Women's annual routine gynecological examination  PAP SMEAR    HIV Screen   2. History of cervical dysplasia  PAP SMEAR   3. Hormone disturbance  FOLLICLE STIMULATING HORMONE    Testosterone, free, total    T4, FREE    TSH without Reflex   4. Surgical menopause, symptomatic  FOLLICLE STIMULATING HORMONE    Testosterone, free, total    T4, FREE    TSH without Reflex   5. Screening examination for STD (sexually transmitted disease)  HIV Screen   6. Exophthalmos  T4, FREE    TSH without Reflex           Plan:      Orders Placed This Encounter   Procedures    SUSIE DIGITAL SCREEN W OR WO CAD BILATERAL    PAP SMEAR    FOLLICLE STIMULATING HORMONE    Testosterone, free, total    HIV Screen    T4, FREE    TSH without Reflex    SPECIMEN REJECTION    Syphilis Antibody Cascading Reflex     Pap smear performed secondary to history of severe dysplasia and conization prior to hysterectomy. Await lab results.    Follow up humberto Landon DO

## 2021-04-21 NOTE — TELEPHONE ENCOUNTER
Pt was called and is aware. She would like order placed to have test done at closer [facility]. Pended. Please sign or advise.

## 2021-07-13 ENCOUNTER — TELEPHONE (OUTPATIENT)
Dept: FAMILY MEDICINE CLINIC | Age: 41
End: 2021-07-13

## 2021-07-13 RX ORDER — SULFAMETHOXAZOLE AND TRIMETHOPRIM 800; 160 MG/1; MG/1
1 TABLET ORAL 2 TIMES DAILY
Qty: 10 TABLET | Refills: 0 | Status: SHIPPED | OUTPATIENT
Start: 2021-07-13 | End: 2021-07-13 | Stop reason: SDUPTHER

## 2021-07-13 RX ORDER — SULFAMETHOXAZOLE AND TRIMETHOPRIM 800; 160 MG/1; MG/1
1 TABLET ORAL 2 TIMES DAILY
Qty: 10 TABLET | Refills: 0 | Status: SHIPPED | OUTPATIENT
Start: 2021-07-13 | End: 2021-07-18

## 2021-07-13 NOTE — TELEPHONE ENCOUNTER
Not seen greater than 1 year. Schedule apt 40 min.   Have ordered antibiotic, if not imiproved needs follow up

## 2021-07-13 NOTE — TELEPHONE ENCOUNTER
Poss bladder inf or UTI. Has frequent urination, pelvic pain, lower back pain, no burning, no fever, a little painful when urinating  x 3 days. Wanting to know if will call something in for her, or if needs to be seen. States she gets UTIs often.  Please call Sea back at  [phone]

## 2021-07-20 ENCOUNTER — OFFICE VISIT (OUTPATIENT)
Dept: FAMILY MEDICINE CLINIC | Age: 41
End: 2021-07-20
Payer: COMMERCIAL

## 2021-07-20 VITALS
DIASTOLIC BLOOD PRESSURE: 94 MMHG | BODY MASS INDEX: 42.8 KG/M2 | HEIGHT: 60 IN | SYSTOLIC BLOOD PRESSURE: 137 MMHG | HEART RATE: 103 BPM | OXYGEN SATURATION: 97 % | WEIGHT: 218 LBS | RESPIRATION RATE: 12 BRPM

## 2021-07-20 DIAGNOSIS — R10.30 LOWER ABDOMINAL PAIN: Primary | ICD-10-CM

## 2021-07-20 DIAGNOSIS — R35.0 URINE FREQUENCY: ICD-10-CM

## 2021-07-20 DIAGNOSIS — Z98.890 S/P ROBOT-ASSISTED SURGICAL PROCEDURE: ICD-10-CM

## 2021-07-20 LAB
BASOPHILS ABSOLUTE: 0 K/UL (ref 0–0.2)
BASOPHILS RELATIVE PERCENT: 0.5 %
BILIRUBIN URINE: NEGATIVE
BLOOD, URINE: NEGATIVE
CLARITY: ABNORMAL
COLOR: YELLOW
EOSINOPHILS ABSOLUTE: 0.4 K/UL (ref 0–0.6)
EOSINOPHILS RELATIVE PERCENT: 4.9 %
EPITHELIAL CELLS, UA: 6 /HPF (ref 0–5)
GLUCOSE URINE: NEGATIVE MG/DL
HCT VFR BLD CALC: 43.2 % (ref 36–48)
HEMOGLOBIN: 13.7 G/DL (ref 12–16)
HYALINE CASTS: 1 /LPF (ref 0–8)
KETONES, URINE: NEGATIVE MG/DL
LEUKOCYTE ESTERASE, URINE: NEGATIVE
LYMPHOCYTES ABSOLUTE: 3.4 K/UL (ref 1–5.1)
LYMPHOCYTES RELATIVE PERCENT: 45.2 %
MCH RBC QN AUTO: 27.2 PG (ref 26–34)
MCHC RBC AUTO-ENTMCNC: 31.6 G/DL (ref 31–36)
MCV RBC AUTO: 86.1 FL (ref 80–100)
MICROSCOPIC EXAMINATION: YES
MONOCYTES ABSOLUTE: 0.5 K/UL (ref 0–1.3)
MONOCYTES RELATIVE PERCENT: 5.9 %
NEUTROPHILS ABSOLUTE: 3.3 K/UL (ref 1.7–7.7)
NEUTROPHILS RELATIVE PERCENT: 43.5 %
NITRITE, URINE: NEGATIVE
PDW BLD-RTO: 14.7 % (ref 12.4–15.4)
PH UA: 6.5 (ref 5–8)
PLATELET # BLD: 174 K/UL (ref 135–450)
PMV BLD AUTO: 8 FL (ref 5–10.5)
PROTEIN UA: NEGATIVE MG/DL
RBC # BLD: 5.02 M/UL (ref 4–5.2)
RBC UA: 1 /HPF (ref 0–4)
SPECIFIC GRAVITY UA: 1.01 (ref 1–1.03)
URINE TYPE: ABNORMAL
UROBILINOGEN, URINE: 0.2 E.U./DL
WBC # BLD: 7.6 K/UL (ref 4–11)
WBC UA: 2 /HPF (ref 0–5)

## 2021-07-20 PROCEDURE — 99214 OFFICE O/P EST MOD 30 MIN: CPT | Performed by: INTERNAL MEDICINE

## 2021-07-20 NOTE — PATIENT INSTRUCTIONS
Continue with Gas-X. We should have your laboratories back tomorrow  If continued symptoms may consider have imaging.   Your liver ultrasound in 2010 was negative for gallstones although this was long ago

## 2021-07-20 NOTE — PROGRESS NOTES
HPI: Sea Steinberg presents for frequent urination, bloating, suprapubic pain, and epigastric discomfort. Chronic health issues include include hypertension, obesity, foot pain, macromastia recent    Frequent urination 1 week. Every 2 hours. Empty nocturia x 3. bactrium no change julianne change in stool  + suprapubic pain. Increased no leakage. Increased gas and bloating. Low-grade fever. No vomiting. No change in diet had crepitus. Ultrasound right upper quadrant  with homogeneous liver kidney and gallbladder. No GI testing or CT scans in the past no known kidneys stones. Is status post laparoscopic pelvic surgery with total hysterectomy bilateral salpingoectomy for cervical cancer and uterine fibroid. Saw gynecologist for  and felt to be doing normally. Frequent urination pelvic pain treated empirically with Bactrim. No improvement. No increase fluid intake. States no diarrhea. May feel better after having stools. Has been eating Grippo[employer] and other nonbland foods. No abdominal imaging since surgery. Concern with tongue bumps and yeast in the past    Elevated blood pressure transiently use hydrochlorothiazide discontinued . BMI 42. Denies vomiting. Positive knee pain.        Chronic foot pain. Positive rheumatoid factor and elevated CRP. Using no medicine. No hot swollen joints. Sr.  of lupus. Miscarriage ×1. No Bactrim allergy. Has microcytic anemia. No proteinuria. No sun poisoning.  Stiffness less than an hour. Plain film 2017 unremarkable. Positive knee pain without evidence of arthritis on films. Robotic total hysterectomy bilateral seen back to me 2019 secondary to menorrhagia, dysmenorrhea, uterine fibroid, and cervical dysplasia. Chronic abdominal pain intermittently. Using Motrin per GYN. Normal FSH     Macromastia.  Back pain.  Breast tenderness.  Intertriginous rash on occasion.     Daughter  of DKA.     FH  pancreatic cancer.  Ultrasound abdomen  with normal-appearing pancreas. Right upper quadrant      PMH    Tubal ligation       . First trimester     Hysterectomy oophorectomy.        SH:  Rare alcohol. No tobacco. No recreational drugs. Sexually active. STD concerns.       FH: 2 siblings    + pancreatic cancer, HTN, DM, lupus      - colon cancer, no mental illness     ROS: Up-to-date with dentist and eye exams.  No concussions or seizures.  Positive postnasal drip.  Snoring no known apnea.  Denies any wheezing.  No chest pain palpitations.  Hypertension lower extremity edema.  Rare GE reflux no bloody stools.  Positive bloating and gas.  Right knee pain.  Intermittent lower extremity edema.     Constitutional, ent, CV, respiratory, GI, , joint, skin, allergic and psychiatric ROS reviewed and negative except for above    Allergies   Allergen Reactions    Ace Inhibitors      cough       Outpatient Medications Marked as Taking for the 21 encounter (Office Visit) with Darrick Funez MD   Medication Sig Dispense Refill    ibuprofen (ADVIL;MOTRIN) 600 MG tablet 1 po up to tid.  90 tablet 1   Gas-X          Past Medical History:   Diagnosis Date    Abnormal Pap smear of cervix     Arthritis     Hyperlipidemia 2018    Hypertension     Iron deficiency 2019    Morbid obesity with BMI of 40.0-44.9, adult ([facility] [address] 2018       Past Surgical History:   Procedure Laterality Date    HYSTERECTOMY ABDOMINAL Bilateral 2019    DAVINCI ROBOTIC TOTAL LAPAROSCOPIC HYSTERECTOMY WITH BILATERAL SALPINGECTOMY performed by Tila Mcguire DO at [address] N/A 2018    CERVICAL COLD KNIFE CONIZATION, COLPOSCOPY UNDER ANESTHESIA performed by Tila Mcguire DO at [facility] 40               Family History   Problem Relation Age of Onset    Diabetes Mother     High Blood Pressure Mother     Cancer Father         pancreatic             Objective     BP (!) 137/94 Pulse 103   Resp 12   Ht 5' (1.524 m)   Wt 218 lb (98.9 kg)   LMP 08/16/2019   SpO2 97% Comment: RA  BMI 42.58 kg/m²     @LASTSAO2(3)@    Wt Readings from Last 3 Encounters:   04/19/21 215 lb (97.5 kg)   05/04/20 221 lb (100.2 kg)   03/12/20 218 lb (98.9 kg)       Physical Exam     NAD alert and cooperative  HEENT: Throat is clear. Pink conjunctive a. Lungs are clear no wheezes rales or rhonchi. Cardiovascular exam regular rate and rhythm no murmur click no tachycardia. No bowel sounds. No rushes or tinkles. No right upper quadrant mass. Positive tenderness left upper quadrant. No left lower quadrant pain. No rebound. No suprapubic pain to palpation  Is appropriate. Well-groomed. Normal affect.     Chemistry        Component Value Date/Time     03/12/2020 1619    K 4.2 03/12/2020 1619     03/12/2020 1619    CO2 22 03/12/2020 1619    BUN 16 03/12/2020 1619    CREATININE 0.7 03/12/2020 1619        Component Value Date/Time    CALCIUM 9.1 03/12/2020 1619    ALKPHOS 65 03/12/2020 1619    AST 14 (L) 03/12/2020 1619    ALT 14 03/12/2020 1619    BILITOT <0.2 03/12/2020 1619            Lab Results   Component Value Date    WBC 8.1 03/12/2020    HGB 12.2 03/12/2020    HCT 37.5 03/12/2020    MCV 80.8 03/12/2020     03/12/2020     Lab Results   Component Value Date    LABA1C 5.4 08/26/2019     Lab Results   Component Value Date    .3 08/26/2019     Lab Results   Component Value Date    LABA1C 5.4 08/26/2019     No components found for: CHLPL  Lab Results   Component Value Date    TRIG 102 03/12/2020    TRIG 89 08/26/2019    TRIG 140 12/20/2018     Lab Results   Component Value Date    HDL 47 03/12/2020    HDL 46 08/26/2019    HDL 36 (L) 12/20/2018     Lab Results   Component Value Date    LDLCALC 126 (H) 03/12/2020    LDLCALC 124 (H) 08/26/2019    LDLCALC 128 (H) 12/20/2018     Lab Results   Component Value Date    LABVLDL 20 03/12/2020    LABVLDL 18 08/26/2019    LABVLDL 28

## 2021-07-21 ENCOUNTER — TELEPHONE (OUTPATIENT)
Dept: FAMILY MEDICINE CLINIC | Age: 41
End: 2021-07-21

## 2021-07-21 DIAGNOSIS — R10.12 LEFT UPPER QUADRANT ABDOMINAL PAIN: Primary | ICD-10-CM

## 2021-07-21 LAB
A/G RATIO: 1.2 (ref 1.1–2.2)
ALBUMIN SERPL-MCNC: 4.2 G/DL (ref 3.4–5)
ALP BLD-CCNC: 66 U/L (ref 40–129)
ALT SERPL-CCNC: 15 U/L (ref 10–40)
ANION GAP SERPL CALCULATED.3IONS-SCNC: 15 MMOL/L (ref 3–16)
AST SERPL-CCNC: 18 U/L (ref 15–37)
BILIRUB SERPL-MCNC: <0.2 MG/DL (ref 0–1)
BUN BLDV-MCNC: 13 MG/DL (ref 7–20)
CALCIUM SERPL-MCNC: 9.6 MG/DL (ref 8.3–10.6)
CHLORIDE BLD-SCNC: 104 MMOL/L (ref 99–110)
CO2: 20 MMOL/L (ref 21–32)
CREAT SERPL-MCNC: 0.8 MG/DL (ref 0.6–1.1)
GFR AFRICAN AMERICAN: >60
GFR NON-AFRICAN AMERICAN: >60
GLOBULIN: 3.6 G/DL
GLUCOSE BLD-MCNC: 69 MG/DL (ref 70–99)
POTASSIUM SERPL-SCNC: 5 MMOL/L (ref 3.5–5.1)
SODIUM BLD-SCNC: 139 MMOL/L (ref 136–145)
TOTAL PROTEIN: 7.8 G/DL (ref 6.4–8.2)

## 2021-07-22 LAB — URINE CULTURE, ROUTINE: NORMAL

## 2021-07-23 ENCOUNTER — TELEPHONE (OUTPATIENT)
Dept: OBGYN CLINIC | Age: 41
End: 2021-07-23

## 2021-07-23 DIAGNOSIS — R10.9 ABDOMINAL PAIN, UNSPECIFIED ABDOMINAL LOCATION: Primary | ICD-10-CM

## 2021-07-23 NOTE — TELEPHONE ENCOUNTER
Patient called in and stated she is having pain in her lower abdomen and sometimes in pelvis region, freqeunt burning when urinating, was seen by PCP, had blood work done, urine tests and is about to have an Xray which all came back negative. Denies fevers, no abnormal discharge or odors. Is asking if maybe a pelvic exam should be needed. Please advise.      Routing to Dr. Laz Chung

## 2021-07-28 NOTE — TELEPHONE ENCOUNTER
Placed call to patient, attempted to schedule an appt. Patient stated \" I want to chat with her, not make an appt\" . Again offered appt to come in and discuss issues. Patient stated \"I want to talk with her on the phone, that is all\"     No appts made. Please advise.      Routing to Dr. Lamonte Cooks

## 2021-07-29 NOTE — TELEPHONE ENCOUNTER
Pain discussed with patient. Pain is primarily upper abdomen and occurs after eating. Patient has seen primary care. She states lab work has been negative. Has not had imaging performed yet. Recommended that patient have imaging performed and follow up with primary care. Will order urine GC/Chlamydia for her to have testing performed when she goes for imaging.

## 2021-08-18 ENCOUNTER — OFFICE VISIT (OUTPATIENT)
Dept: FAMILY MEDICINE CLINIC | Age: 41
End: 2021-08-18
Payer: COMMERCIAL

## 2021-08-18 VITALS
HEART RATE: 84 BPM | HEIGHT: 61 IN | DIASTOLIC BLOOD PRESSURE: 98 MMHG | RESPIRATION RATE: 16 BRPM | BODY MASS INDEX: 41.16 KG/M2 | OXYGEN SATURATION: 97 % | WEIGHT: 218 LBS | SYSTOLIC BLOOD PRESSURE: 142 MMHG

## 2021-08-18 DIAGNOSIS — E78.2 MIXED HYPERLIPIDEMIA: ICD-10-CM

## 2021-08-18 DIAGNOSIS — Z98.890 S/P ROBOT-ASSISTED SURGICAL PROCEDURE: ICD-10-CM

## 2021-08-18 DIAGNOSIS — Z00.01 ENCOUNTER FOR GENERAL ADULT MEDICAL EXAMINATION WITH ABNORMAL FINDINGS: Primary | ICD-10-CM

## 2021-08-18 DIAGNOSIS — I10 HYPERTENSION, UNSPECIFIED TYPE: ICD-10-CM

## 2021-08-18 DIAGNOSIS — Z87.410 HISTORY OF CERVICAL DYSPLASIA: ICD-10-CM

## 2021-08-18 DIAGNOSIS — R10.9 ABDOMINAL PAIN, UNSPECIFIED ABDOMINAL LOCATION: ICD-10-CM

## 2021-08-18 DIAGNOSIS — M94.20 CHONDROMALACIA: ICD-10-CM

## 2021-08-18 PROCEDURE — 99396 PREV VISIT EST AGE 40-64: CPT | Performed by: INTERNAL MEDICINE

## 2021-08-18 RX ORDER — IBUPROFEN 600 MG/1
TABLET ORAL
Qty: 90 TABLET | Refills: 0 | Status: SHIPPED | OUTPATIENT
Start: 2021-08-18 | End: 2022-04-06 | Stop reason: SDUPTHER

## 2021-08-18 RX ORDER — IBUPROFEN 600 MG/1
TABLET ORAL
Qty: 90 TABLET | Refills: 1 | Status: CANCELLED | OUTPATIENT
Start: 2021-08-18

## 2021-08-18 SDOH — ECONOMIC STABILITY: FOOD INSECURITY: WITHIN THE PAST 12 MONTHS, YOU WORRIED THAT YOUR FOOD WOULD RUN OUT BEFORE YOU GOT MONEY TO BUY MORE.: NEVER TRUE

## 2021-08-18 SDOH — ECONOMIC STABILITY: FOOD INSECURITY: WITHIN THE PAST 12 MONTHS, THE FOOD YOU BOUGHT JUST DIDN'T LAST AND YOU DIDN'T HAVE MONEY TO GET MORE.: NEVER TRUE

## 2021-08-18 ASSESSMENT — PATIENT HEALTH QUESTIONNAIRE - PHQ9
2. FEELING DOWN, DEPRESSED OR HOPELESS: 0
SUM OF ALL RESPONSES TO PHQ QUESTIONS 1-9: 0
1. LITTLE INTEREST OR PLEASURE IN DOING THINGS: 0
SUM OF ALL RESPONSES TO PHQ QUESTIONS 1-9: 0
SUM OF ALL RESPONSES TO PHQ9 QUESTIONS 1 & 2: 0
SUM OF ALL RESPONSES TO PHQ QUESTIONS 1-9: 0

## 2021-08-18 ASSESSMENT — SOCIAL DETERMINANTS OF HEALTH (SDOH): HOW HARD IS IT FOR YOU TO PAY FOR THE VERY BASICS LIKE FOOD, HOUSING, MEDICAL CARE, AND HEATING?: NOT HARD AT ALL

## 2021-08-18 NOTE — PATIENT INSTRUCTIONS
Your urine test will be sent to your gynecologist.  Continuing your current medications. There are some knee exercises that may be helpful. This will not take away the cracking sound. At some point we should get your fasting cholesterol. We do not need to do that today. Follow-up with your gynecologist after your urine results  Let me know if you are interested in the weight loss clinic after you get situation. Patient Education        Patellofemoral Pain Syndrome (Runner's Knee): Exercises  Introduction  Here are some examples of exercises for you to try. The exercises may be suggested for a condition or for rehabilitation. Start each exercise slowly. Ease off the exercises if you start to have pain. You will be told when to start these exercises and which ones will work best for you. How to do the exercises  Calf wall stretch   1. Stand facing a wall with your hands on the wall at about eye level. Put your affected leg about a step behind your other leg. 2. Keeping your back leg straight and your back heel on the floor, bend your front knee and gently bring your hip and chest toward the wall until you feel a stretch in the calf of your back leg. 3. Hold the stretch for at least 15 to 30 seconds. 4. Repeat 2 to 4 times. 5. Repeat steps 1 through 4, but this time keep your back knee bent. Quadriceps stretch   1. If you are not steady on your feet, hold on to a chair, counter, or wall. 2. Bend your affected leg, and reach behind you to grab the front of your foot or ankle with the hand on the same side. For example, if you are stretching your right leg, use your right hand. 3. Keeping your knees next to each other, pull your foot toward your buttock until you feel a gentle stretch across the front of your hip and down the front of your thigh. Your knee should be pointed directly to the ground, and not out to the side. 4. Hold the stretch for at least 15 to 30 seconds. 5. Repeat 2 to 4 times. Hamstring wall stretch   1. Lie on your back in a doorway, with your good leg through the open door. 2. Slide your affected leg up the wall to straighten your knee. You should feel a gentle stretch down the back of your leg. 3. Hold the stretch for at least 1 minute. Then over time, try to lengthen the time you hold the stretch to as long as 6 minutes. 4. Repeat 2 to 4 times. 5. If you do not have a place to do this exercise in a doorway, there is another way to do it:  6. Lie on your back, and bend your affected leg. 7. Loop a towel under the ball and toes of that foot, and hold the ends of the towel in your hands. 8. Straighten your knee, and slowly pull back on the towel. You should feel a gentle stretch down the back of your leg. 9. Hold the stretch for at least 15 to 30 seconds. Or even better, hold the stretch for 1 minute if you can. 10. Repeat 2 to 4 times. 1. Do not arch your back. 2. Do not bend either knee. 3. Keep one heel touching the floor and the other heel touching the wall. Do not point your toes. Quad sets   1. Sit with your affected leg straight and supported on the floor or a firm bed. Place a small, rolled-up towel under your affected knee. Your other leg should be bent, with that foot flat on the floor. 2. Tighten the thigh muscles of your affected leg by pressing the back of your knee down into the towel. 3. Hold for about 6 seconds, then rest for up to 10 seconds. 4. Repeat 8 to 12 times. Straight-leg raises to the front   1. Lie on your back with your good knee bent so that your foot rests flat on the floor. Your affected leg should be straight. Make sure that your low back has a normal curve. You should be able to slip your hand in between the floor and the small of your back, with your palm touching the floor and your back touching the back of your hand. 2. Tighten the thigh muscles in your affected leg by pressing the back of your knee flat down to the floor.  Hold your knee straight. 3. Keeping the thigh muscles tight and your leg straight, lift your affected leg up so that your heel is about 12 inches off the floor. 4. Hold for about 6 seconds, then lower your leg slowly. Rest for up to 10 seconds between repetitions. 5. Repeat 8 to 12 times. Straight-leg raises to the back   1. Lie on your stomach, and lift your leg straight up behind you (toward the ceiling). 2. Lift your toes about 6 inches off the floor, hold for about 6 seconds, then lower slowly. 3. Do 8 to 12 repetitions. Wall slide with ball squeeze   1. Stand with your back against a wall and with your feet about shoulder-width apart. Your feet should be about 12 inches away from the wall. 2. Put a ball about the size of a soccer ball between your knees. Then slowly slide down the wall until your knees are bent about 20 to 30 degrees. 3. Tighten your thigh muscles by squeezing the ball between your knees. Hold that position for about 10 seconds, then stop squeezing. Rest for up to 10 seconds between repetitions. 4. Repeat 8 to 12 times. Follow-up care is a key part of your treatment and safety. Be sure to make and go to all appointments, and call your doctor if you are having problems. It's also a good idea to know your test results and keep a list of the medicines you take. Where can you learn more? Go to https://Exhibiavickieb.Matter.io. org and sign in to your sMedio account. Enter A404 in the [facility] box to learn more about \"Patellofemoral Pain Syndrome (Runner's Knee): Exercises. \"     If you do not have an account, please click on the \"Sign Up Now\" link. Current as of: November 16, 2020               Content Version: 12.9  © 2006-2021 Healthwise, Incorporated. Care instructions adapted under license by [facility] ([facility]).  If you have questions about a medical condition or this instruction, always ask your healthcare professional. Ebenezer Nolan any warranty or liability for your use of this information. Patient Education        Well Visit, Ages 25 to 48: Care Instructions  Overview     Well visits can help you stay healthy. Your doctor has checked your overall health and may have suggested ways to take good care of yourself. Your doctor also may have recommended tests. At home, you can help prevent illness with healthy eating, regular exercise, and other steps. Follow-up care is a key part of your treatment and safety. Be sure to make and go to all appointments, and call your doctor if you are having problems. It's also a good idea to know your test results and keep a list of the medicines you take. How can you care for yourself at home? · Get screening tests that you and your doctor decide on. Screening helps find diseases before any symptoms appear. · Eat healthy foods. Choose fruits, vegetables, whole grains, protein, and low-fat dairy foods. Limit fat, especially saturated fat. Reduce salt in your diet. · Limit alcohol. If you are a man, have no more than 2 drinks a day or 14 drinks a week. If you are a woman, have no more than 1 drink a day or 7 drinks a week. · Get at least 30 minutes of physical activity on most days of the week. Walking is a good choice. You also may want to do other activities, such as running, swimming, cycling, or playing tennis or team sports. Discuss any changes in your exercise program with your doctor. · Reach and stay at a healthy weight. This will lower your risk for many problems, such as obesity, diabetes, heart disease, and high blood pressure. · Do not smoke or allow others to smoke around you. If you need help quitting, talk to your doctor about stop-smoking programs and medicines. These can increase your chances of quitting for good. · Care for your mental health. It is easy to get weighed down by worry and stress.  Learn strategies to manage stress, like deep breathing and mindfulness, and stay connected with your family and community. If you find you often feel sad or hopeless, talk with your doctor. Treatment can help. · Talk to your doctor about whether you have any risk factors for sexually transmitted infections (STIs). You can help prevent STIs if you wait to have sex with a new partner (or partners) until you've each been tested for STIs. It also helps if you use condoms (male or female condoms) and if you limit your sex partners to one person who only has sex with you. Vaccines are available for some STIs, such as HPV. · Use birth control if it's important to you to prevent pregnancy. Talk with your doctor about the choices available and what might be best for you. · If you think you may have a problem with alcohol or drug use, talk to your doctor. This includes prescription medicines (such as amphetamines and opioids) and illegal drugs (such as cocaine and methamphetamine). Your doctor can help you figure out what type of treatment is best for you. · Protect your skin from too much sun. When you're outdoors from 10 a.m. to 4 p.m., stay in the shade or cover up with clothing and a hat with a wide brim. Wear sunglasses that block UV rays. Even when it's cloudy, put broad-spectrum sunscreen (SPF 30 or higher) on any exposed skin. · See a dentist one or two times a year for checkups and to have your teeth cleaned. · Wear a seat belt in the car. When should you call for help? Watch closely for changes in your health, and be sure to contact your doctor if you have any problems or symptoms that concern you. Where can you learn more? Go to https://anjel.healthBlue Cod Technologies. org and sign in to your MEMSIC account. Enter P072 in the [facility] box to learn more about \"Well Visit, Ages 25 to 48: Care Instructions. \"     If you do not have an account, please click on the \"Sign Up Now\" link. Current as of: May 27, 2020               Content Version: 12.9  © 4675-9220 Healthwise, [employer]ufind. Care instructions adapted under license by [facility] ([facility]). If you have questions about a medical condition or this instruction, always ask your healthcare professional. [address] any warranty or liability for your use of this information.

## 2021-08-18 NOTE — PROGRESS NOTES
History and Physical      Preventive Care:  Health Maintenance   Topic Date Due    COVID-19 Vaccine (1) Never done    Flu vaccine (1) 2021    Cervical cancer screen  10/19/2021    Potassium monitoring  2022    Creatinine monitoring  2022    Lipid screen  2025    DTaP/Tdap/Td vaccine (3 - Td or Tdap) 2026    Hepatitis C screen  Completed    HIV screen  Completed    Hepatitis A vaccine  Aged Out    Hepatitis B vaccine  Aged Out    Hib vaccine  Aged Out    Meningococcal (ACWY) vaccine  Aged Out    Pneumococcal 0-64 years Vaccine  Aged Out        Lipid panel:   Lab Results   Component Value Date    CHOL 193 2020    TRIG 102 2020    HDL 47 2020    LDLCALC 126 (H) 2020        Living will:  Homemade living will. HPI: Carmen Sapp presents for annual    Chronic health issues include include hypertension, obesity, foot pain, macromastia recent    Wants motrin, annual,chronic knee pain. 2021 with frequent urination abdominal discomfort. Perirectal irritation. Gas bloating. Urinalysis negative culture. Bactrim begun prior to results. Diflucan for irritation. She is status post laparoscopic pelvic surgery and total hysterectomy and bilateral salpingectomy for cervical cancer. Has a gynecologist who is recommended pelvic ultrasound GC and Chlamydia tests for pelvic pain. She has not done so yet. Denies any burning with urination currently. Feels like she is she is having some unusual sensation with intercourse since surgery. Plans to follow back up at a later date. Is on hydrochlorothiazide for elevated blood pressure. Back on. . Not fasting today. BMI 42. Denies GE reflux,  apnea. Knee pain. Normal knee films 2017. No locking swelling or falling       Chronic foot pain improved. Katalina Coreas Positive rheumatoid factor and elevated CRP. Using no medicine. No hot swollen joints. Sr.  of lupus. Miscarriage ×1.  No Bactrim allergy. Has microcytic anemia. No proteinuria. No sun poisoning.  Stiffness less than an hour. Plain film 2017 unremarkable. Positive knee pain without evidence of arthritis on films 2017. .     Robotic total hysterectomy bilateral salpingoectomy 2019 secondary to menorrhagia, dysmenorrhea, uterine fibroid, and cervical dysplasia. Chronic abdominal pain intermittently. Using Motrin per GYN. Normal FSH. Ovaries still present     Macromastia.  Back pain.  Breast tenderness.  Intertriginous rash on occasion.     Daughter  of DKA. Is getting ready to move to [place] this month    , dysplasia.      FH   + pancreatic cancer, DM HTN,    - colon,.       PMH    Tubal ligation       . First trimester     Hysterectomy bilateral salpingoectomy.        SH:  corona whiskaudelia,  No tobacco. No recreational drugs. Sexually active. STD concerns.  work from home [employer]      FH: 2 siblings    + pancreatic cancer, HTN, DM, lupus      - colon cancer, no mental illness     ROS: Up-to-date with dentist and eye exams.  No concussions or seizures.  Positive postnasal drip.  Snoring no known apnea.  Denies any wheezing.  No chest pain palpitations.  Hypertension lower extremity edema.  Rare GE reflux no bloody stools.  Positive bloating and gas.  Right knee pain.  Intermittent lower extremity edema.     Constitutional, ent, CV, respiratory, GI, , joint, skin, allergic and psychiatric ROS reviewed and negative except for above    Allergies   Allergen Reactions    Ace Inhibitors      cough       Outpatient Medications Marked as Taking for the 21 encounter (Office Visit) with Javy Amrbosio MD   Medication Sig Dispense Refill    ibuprofen (ADVIL;MOTRIN) 600 MG tablet 1 po up to tid for pain. Not a daily medication.  90 tablet 0    hydroCHLOROthiazide (HYDRODIURIL) 25 MG tablet TAKE ONE TABLET BY MOUTH ONE TIME A DAY 90 tablet 1             Past Medical History:   Diagnosis Date    Abnormal Pap smear of cervix     Arthritis     Hyperlipidemia 12/19/2018    Hypertension     Iron deficiency 8/26/2019    Morbid obesity with BMI of 40.0-44.9, adult ([address] 5/28/2018       Past Surgical History:   Procedure Laterality Date    HYSTERECTOMY ABDOMINAL Bilateral 8/29/2019    DAVINCI ROBOTIC TOTAL LAPAROSCOPIC HYSTERECTOMY WITH BILATERAL SALPINGECTOMY performed by Keith Lesch, DO at [address] [place]/A 7/27/2018    CERVICAL COLD KNIFE CONIZATION, COLPOSCOPY UNDER ANESTHESIA performed by Keith Lesch, DO at [place] [address]  2001             Family History   Problem Relation Age of Onset    Diabetes Mother     High Blood Pressure Mother     Cancer Father         pancreatic               Objective     BP (!) 142/98 (Site: Right Upper Arm, Position: Sitting, Cuff Size: Large Adult)   Pulse 84   Resp 16   Ht 5' 1\" (1.549 m)   Wt 218 lb (98.9 kg)   LMP 08/16/2019   SpO2 97%   BMI 41.19 kg/m²     @LASTSAO2(3)@    Wt Readings from Last 3 Encounters:   07/20/21 218 lb (98.9 kg)   04/19/21 215 lb (97.5 kg)   05/04/20 221 lb (100.2 kg)       Physical Exam     NAD alert and cooperative  HEENT: Crowded hypopharynx. TMs unremarkable. Good dentition. Good eye contact. Positive proptosis. Full neck. No adenopathy. Lungs are clear no wheezes rales or rhonchi. Cardiovascular exam no murmur click. Abdomen obese abdomen without any point tenderness rebound or mass noted. Crepitus of the knees right greater than left. No edema. Multiple tattoos. No suspicious skin lesions  Affect is normal.  Good eye contact. Well-groomed.     Chemistry        Component Value Date/Time     07/20/2021 1631    K 5.0 07/20/2021 1631     07/20/2021 1631    CO2 20 (L) 07/20/2021 1631    BUN 13 07/20/2021 1631    CREATININE 0.8 07/20/2021 1631        Component Value Date/Time    CALCIUM 9.6 07/20/2021 1631    ALKPHOS 66 07/20/2021 1631    AST 18 07/20/2021 1631 ALT 15 07/20/2021 1631    BILITOT <0.2 07/20/2021 1631            Lab Results   Component Value Date    WBC 7.6 07/20/2021    HGB 13.7 07/20/2021    HCT 43.2 07/20/2021    MCV 86.1 07/20/2021     07/20/2021     Lab Results   Component Value Date    LABA1C 5.4 08/26/2019     Lab Results   Component Value Date    .3 08/26/2019     Lab Results   Component Value Date    LABA1C 5.4 08/26/2019     No components found for: CHLPL  Lab Results   Component Value Date    TRIG 102 03/12/2020    TRIG 89 08/26/2019    TRIG 140 12/20/2018     Lab Results   Component Value Date    HDL 47 03/12/2020    HDL 46 08/26/2019    HDL 36 (L) 12/20/2018     Lab Results   Component Value Date    LDLCALC 126 (H) 03/12/2020    LDLCALC 124 (H) 08/26/2019    LDLCALC 128 (H) 12/20/2018     Lab Results   Component Value Date    LABVLDL 20 03/12/2020    LABVLDL 18 08/26/2019    LABVLDL 28 12/20/2018       Old labs and records reviewed or requested  Discussed past lab and studies with patient    Diagnosis Orders   1. Encounter for general adult medical examination with abnormal findings     2. Hypertension, unspecified type     3. Mixed hyperlipidemia     4. History of cervical dysplasia  Chlamydia trachomatis DNA, Urine    Neisseria gonorrhoeae DNA, Urine   5. Chondromalacia     6. Abdominal pain, unspecified abdominal location     7. S/P robot-assisted surgical procedure       Obesity. Discussed comorbidities. Weight loss clinic offered. She will contact me when she gets back from [place]. Hypertension good control continue current medication. Hyperlipidemia fasting lipid profile in future. History of cervical dysplasia with hysterectomy and bilateral salpingectomy. Is to have STD screening per GYN. Urinalysis obtained. Transient abdominal pain improved. Chondromalacia right knee. Exercises given. No follow-ups on file. Diagnosis and treatment discussed.   Possible side effects of medication reviewed  Patients questions answered  Follow up understood  Pt aware if they are not contacted about any test results , this does not mean they are normal.  They should call

## 2021-08-19 LAB
C. TRACHOMATIS DNA ,URINE: NEGATIVE
N. GONORRHOEAE DNA, URINE: NEGATIVE

## 2022-01-20 ENCOUNTER — TELEPHONE (OUTPATIENT)
Dept: FAMILY MEDICINE CLINIC | Age: 42
End: 2022-01-20

## 2022-04-06 RX ORDER — IBUPROFEN 600 MG/1
TABLET ORAL
Qty: 90 TABLET | Refills: 0 | Status: SHIPPED | OUTPATIENT
Start: 2022-04-06

## 2022-04-06 NOTE — TELEPHONE ENCOUNTER
Pt requesting a refill of motrin for her breast tenderness. Pap scheduled for 6/2022. Please sign or advise.

## 2022-09-07 NOTE — TELEPHONE ENCOUNTER
Medication:   Requested Prescriptions     Pending Prescriptions Disp Refills    triamcinolone (KENALOG) 0.1 % ointment [Pharmacy Med Name: TRIAMCINOLONE 0.1% OINTMENT 454GM] 454 g 0     Sig: APPLY TOPICALLY TO THE AFFECTED AREA TWICE DAILY FOR 7 DAYS        Last Filled:  3/21/2022    Patient Phone Number: [phone] (home)     Last appt: 8/18/2021   Next appt: Visit date not found    Last OARRS: No flowsheet data found.

## 2022-10-27 ENCOUNTER — TELEPHONE (OUTPATIENT)
Dept: FAMILY MEDICINE CLINIC | Age: 42
End: 2022-10-27

## 2022-10-27 NOTE — TELEPHONE ENCOUNTER
OLYA del cid was calling for refill on inhaler. Advised that Dr Johnny Augustin was retired, that I could schedule an appt for her with out [facility] office. She stated she got the inhaler from a [facility] doctor and she only has 20 puffs left. Advised I again could schedule her an appointment to see a physician at [place] office, and then once scheduled someone here would be able to call in refill on inhaler. Pt ended up hanging up.

## 2023-01-11 ENCOUNTER — OFFICE VISIT (OUTPATIENT)
Dept: INTERNAL MEDICINE CLINIC | Age: 43
End: 2023-01-11
Payer: COMMERCIAL

## 2023-01-11 ENCOUNTER — APPOINTMENT (OUTPATIENT)
Dept: GENERAL RADIOLOGY | Age: 43
End: 2023-01-11
Payer: COMMERCIAL

## 2023-01-11 ENCOUNTER — HOSPITAL ENCOUNTER (EMERGENCY)
Age: 43
Discharge: HOME | End: 2023-01-11
Payer: COMMERCIAL

## 2023-01-11 VITALS
SYSTOLIC BLOOD PRESSURE: 204 MMHG | TEMPERATURE: 98.2 F | DIASTOLIC BLOOD PRESSURE: 96 MMHG | OXYGEN SATURATION: 99 % | WEIGHT: 238.32 LBS | HEIGHT: 60 IN | BODY MASS INDEX: 46.79 KG/M2 | HEART RATE: 73 BPM | RESPIRATION RATE: 18 BRPM

## 2023-01-11 VITALS
DIASTOLIC BLOOD PRESSURE: 103 MMHG | SYSTOLIC BLOOD PRESSURE: 182 MMHG | OXYGEN SATURATION: 98 % | BODY MASS INDEX: 46.57 KG/M2 | HEART RATE: 90 BPM | WEIGHT: 237.2 LBS | TEMPERATURE: 97.3 F | HEIGHT: 60 IN

## 2023-01-11 DIAGNOSIS — M79.89 PAIN AND SWELLING OF LOWER EXTREMITY, RIGHT: ICD-10-CM

## 2023-01-11 DIAGNOSIS — I10 PRIMARY HYPERTENSION: ICD-10-CM

## 2023-01-11 DIAGNOSIS — M25.561 ACUTE PAIN OF RIGHT KNEE: Primary | ICD-10-CM

## 2023-01-11 DIAGNOSIS — M79.604 PAIN AND SWELLING OF LOWER EXTREMITY, RIGHT: ICD-10-CM

## 2023-01-11 DIAGNOSIS — M79.671 RIGHT FOOT PAIN: ICD-10-CM

## 2023-01-11 DIAGNOSIS — I10 UNCONTROLLED HYPERTENSION: ICD-10-CM

## 2023-01-11 DIAGNOSIS — M79.89 RIGHT LEG SWELLING: ICD-10-CM

## 2023-01-11 DIAGNOSIS — M17.11 OSTEOARTHRITIS OF RIGHT KNEE, UNSPECIFIED OSTEOARTHRITIS TYPE: ICD-10-CM

## 2023-01-11 DIAGNOSIS — M25.461 EFFUSION OF RIGHT KNEE: ICD-10-CM

## 2023-01-11 LAB
ANION GAP SERPL CALCULATED.3IONS-SCNC: 10 MMOL/L (ref 3–16)
ANION GAP SERPL CALCULATED.3IONS-SCNC: 9 MMOL/L (ref 3–16)
BASOPHILS ABSOLUTE: 0.1 K/UL (ref 0–0.2)
BASOPHILS RELATIVE PERCENT: 0.6 %
BUN BLDV-MCNC: 11 MG/DL (ref 7–20)
BUN BLDV-MCNC: 14 MG/DL (ref 7–20)
CALCIUM SERPL-MCNC: 9.1 MG/DL (ref 8.3–10.6)
CALCIUM SERPL-MCNC: 9.2 MG/DL (ref 8.3–10.6)
CHLORIDE BLD-SCNC: 101 MMOL/L (ref 99–110)
CHLORIDE BLD-SCNC: 102 MMOL/L (ref 99–110)
CO2: 25 MMOL/L (ref 21–32)
CO2: 25 MMOL/L (ref 21–32)
CREAT SERPL-MCNC: 0.7 MG/DL (ref 0.6–1.1)
CREAT SERPL-MCNC: 0.8 MG/DL (ref 0.6–1.1)
D DIMER: 0.58 UG/ML FEU (ref 0–0.6)
EOSINOPHILS ABSOLUTE: 0.3 K/UL (ref 0–0.6)
EOSINOPHILS RELATIVE PERCENT: 2.9 %
GFR SERPL CREATININE-BSD FRML MDRD: >60 ML/MIN/{1.73_M2}
GFR SERPL CREATININE-BSD FRML MDRD: >60 ML/MIN/{1.73_M2}
GLUCOSE BLD-MCNC: 81 MG/DL (ref 70–99)
GLUCOSE BLD-MCNC: 93 MG/DL (ref 70–99)
HCT VFR BLD CALC: 37.5 % (ref 36–48)
HEMOGLOBIN: 11.9 G/DL (ref 12–16)
INR BLD: 1.01 (ref 0.87–1.14)
LYMPHOCYTES ABSOLUTE: 2.7 K/UL (ref 1–5.1)
LYMPHOCYTES RELATIVE PERCENT: 25.7 %
MCH RBC QN AUTO: 26.1 PG (ref 26–34)
MCHC RBC AUTO-ENTMCNC: 31.8 G/DL (ref 31–36)
MCV RBC AUTO: 82 FL (ref 80–100)
MONOCYTES ABSOLUTE: 0.6 K/UL (ref 0–1.3)
MONOCYTES RELATIVE PERCENT: 5.3 %
NEUTROPHILS ABSOLUTE: 6.9 K/UL (ref 1.7–7.7)
NEUTROPHILS RELATIVE PERCENT: 65.5 %
PDW BLD-RTO: 14 % (ref 12.4–15.4)
PLATELET # BLD: 399 K/UL (ref 135–450)
PMV BLD AUTO: 7 FL (ref 5–10.5)
POTASSIUM REFLEX MAGNESIUM: 4.2 MMOL/L (ref 3.5–5.1)
POTASSIUM SERPL-SCNC: 3.9 MMOL/L (ref 3.5–5.1)
PROTHROMBIN TIME: 13.2 SEC (ref 11.7–14.5)
RBC # BLD: 4.57 M/UL (ref 4–5.2)
SODIUM BLD-SCNC: 135 MMOL/L (ref 136–145)
SODIUM BLD-SCNC: 137 MMOL/L (ref 136–145)
WBC # BLD: 10.5 K/UL (ref 4–11)

## 2023-01-11 PROCEDURE — 85610 PROTHROMBIN TIME: CPT

## 2023-01-11 PROCEDURE — 80048 BASIC METABOLIC PNL TOTAL CA: CPT

## 2023-01-11 PROCEDURE — 3078F DIAST BP <80 MM HG: CPT | Performed by: STUDENT IN AN ORGANIZED HEALTH CARE EDUCATION/TRAINING PROGRAM

## 2023-01-11 PROCEDURE — 99284 EMERGENCY DEPT VISIT MOD MDM: CPT

## 2023-01-11 PROCEDURE — 3074F SYST BP LT 130 MM HG: CPT | Performed by: STUDENT IN AN ORGANIZED HEALTH CARE EDUCATION/TRAINING PROGRAM

## 2023-01-11 PROCEDURE — 73630 X-RAY EXAM OF FOOT: CPT

## 2023-01-11 PROCEDURE — 6370000000 HC RX 637 (ALT 250 FOR IP): Performed by: NURSE PRACTITIONER

## 2023-01-11 PROCEDURE — 85379 FIBRIN DEGRADATION QUANT: CPT

## 2023-01-11 PROCEDURE — 85025 COMPLETE CBC W/AUTO DIFF WBC: CPT

## 2023-01-11 PROCEDURE — 73560 X-RAY EXAM OF KNEE 1 OR 2: CPT

## 2023-01-11 PROCEDURE — 99204 OFFICE O/P NEW MOD 45 MIN: CPT | Performed by: STUDENT IN AN ORGANIZED HEALTH CARE EDUCATION/TRAINING PROGRAM

## 2023-01-11 RX ORDER — LOSARTAN POTASSIUM AND HYDROCHLOROTHIAZIDE 12.5; 5 MG/1; MG/1
1 TABLET ORAL DAILY
Qty: 90 TABLET | Refills: 1 | Status: SHIPPED | OUTPATIENT
Start: 2023-01-11

## 2023-01-11 RX ORDER — AMLODIPINE BESYLATE 5 MG/1
10 TABLET ORAL ONCE
Status: COMPLETED | OUTPATIENT
Start: 2023-01-11 | End: 2023-01-11

## 2023-01-11 RX ADMIN — AMLODIPINE BESYLATE 10 MG: 5 TABLET ORAL at 23:19

## 2023-01-11 SDOH — ECONOMIC STABILITY: FOOD INSECURITY: WITHIN THE PAST 12 MONTHS, YOU WORRIED THAT YOUR FOOD WOULD RUN OUT BEFORE YOU GOT MONEY TO BUY MORE.: NEVER TRUE

## 2023-01-11 SDOH — ECONOMIC STABILITY: FOOD INSECURITY: WITHIN THE PAST 12 MONTHS, THE FOOD YOU BOUGHT JUST DIDN'T LAST AND YOU DIDN'T HAVE MONEY TO GET MORE.: NEVER TRUE

## 2023-01-11 ASSESSMENT — PAIN - FUNCTIONAL ASSESSMENT: PAIN_FUNCTIONAL_ASSESSMENT: NONE - DENIES PAIN

## 2023-01-11 ASSESSMENT — PATIENT HEALTH QUESTIONNAIRE - PHQ9
SUM OF ALL RESPONSES TO PHQ QUESTIONS 1-9: 0
SUM OF ALL RESPONSES TO PHQ QUESTIONS 1-9: 0
SUM OF ALL RESPONSES TO PHQ9 QUESTIONS 1 & 2: 0
2. FEELING DOWN, DEPRESSED OR HOPELESS: 0
SUM OF ALL RESPONSES TO PHQ QUESTIONS 1-9: 0
SUM OF ALL RESPONSES TO PHQ QUESTIONS 1-9: 0
1. LITTLE INTEREST OR PLEASURE IN DOING THINGS: 0

## 2023-01-11 ASSESSMENT — SOCIAL DETERMINANTS OF HEALTH (SDOH): HOW HARD IS IT FOR YOU TO PAY FOR THE VERY BASICS LIKE FOOD, HOUSING, MEDICAL CARE, AND HEATING?: NOT HARD AT ALL

## 2023-01-11 NOTE — PROGRESS NOTES
Avi Bartlett (:  1980) is a 38 y.o. female,New patient, here for evaluation of the following chief complaint(s): Foot Swelling (In the right knee moving to foot now pt would a referral. Pt foot has been swollen x 1 week and is pain  )    Chronic health issues include include hypertension, obesity, foot pain, macromastia recent       ASSESSMENT/PLAN:  1. Acute pain of right knee-no reported history of trauma. Likely in the setting of arthritis, will get x-ray of her right knee. -     XR KNEE RIGHT (1-2 VIEWS); Future  2. Right foot pain-new associated with swelling. Was on hydrochlorothiazide in the past but not taking any medication. -     XR FOOT RIGHT (MIN 3 VIEWS); Future  -     US DUP LOWER EXTREMITY RIGHT GEORGE; Future  3. Primary hypertension-poorly controlled. Was on HCTZ in the past.  Started her on Hyzaar daily. Low-salt diet and exercise counseling provided. BP Readings from Last 3 Encounters:   23 (!) 204/96   23 (!) 182/103   21 (!) 142/98       -     Basic Metabolic Panel; Future  -     losartan-hydroCHLOROthiazide (HYZAAR) 50-12.5 MG per tablet; Take 1 tablet by mouth daily, Disp-90 tablet, R-1Normal  4. Right leg swelling-follow-up with her foot and shaft. Reports she is inactive most of the time. We will get an ultrasound of the right lower extremity to rule out DVT for  -     US DUP LOWER EXTREMITY RIGHT GEORGE; Future    No follow-ups on file. Subjective   SUBJECTIVE/OBJECTIVE:  For 1 week, right knee swelling,   Known history of arthritis. Saturday - urgent care, ibuprofen       Foot Swelling   The pain is present in the right ankle and right knee. This is a recurrent problem. There has been no history of extremity trauma. The problem occurs daily. The problem has been gradually improving. The quality of the pain is described as aching. The pain is at a severity of 5/10. The pain is moderate.  Pertinent negatives include no fever, stiffness or tingling. She has tried NSAIDS for the symptoms. The treatment provided moderate relief. Review of Systems   Constitutional:  Negative for chills, fatigue and fever. HENT:  Negative for congestion, ear pain, rhinorrhea and sore throat. Eyes:  Negative for discharge, redness and visual disturbance. Respiratory:  Negative for cough and chest tightness. Cardiovascular:  Negative for chest pain, palpitations and leg swelling. Gastrointestinal:  Negative for abdominal pain, nausea and vomiting. Endocrine: Negative. Genitourinary:  Negative for dysuria. Musculoskeletal:  Negative for back pain, gait problem and stiffness. Skin: Negative. Neurological:  Negative for tingling, syncope, facial asymmetry, speech difficulty, light-headedness and headaches. Objective   Physical Exam  Vitals reviewed. Constitutional:       Appearance: Normal appearance. HENT:      Head: Normocephalic. Eyes:      Extraocular Movements: Extraocular movements intact. Pupils: Pupils are equal, round, and reactive to light. Cardiovascular:      Rate and Rhythm: Normal rate. Heart sounds: Normal heart sounds. No murmur heard. Pulmonary:      Effort: Pulmonary effort is normal.      Breath sounds: Normal breath sounds. Abdominal:      General: Bowel sounds are normal.      Palpations: Abdomen is soft. Musculoskeletal:         General: Normal range of motion. Right lower le+ Edema present. Left lower leg: No edema. Feet:      Right foot:      Skin integrity: No ulcer, blister or skin breakdown. Left foot:      Skin integrity: No ulcer, blister, skin breakdown or erythema. Skin:     General: Skin is warm. Neurological:      General: No focal deficit present. Mental Status: She is alert and oriented to person, place, and time. Mental status is at baseline.    Psychiatric:         Mood and Affect: Mood normal.              Please note that this chart was generated using dragon dictation software. Although every effort was made to ensure the accuracy of this automated transcription, some errors in transcription may have occurred. An electronic signature was used to authenticate this note.     --Valeri Ledesma MD

## 2023-01-12 ENCOUNTER — TELEPHONE (OUTPATIENT)
Dept: INTERNAL MEDICINE CLINIC | Age: 43
End: 2023-01-12

## 2023-01-12 DIAGNOSIS — M79.89 RIGHT LEG SWELLING: ICD-10-CM

## 2023-01-12 DIAGNOSIS — M25.561 ACUTE PAIN OF RIGHT KNEE: Primary | ICD-10-CM

## 2023-01-12 RX ORDER — IBUPROFEN 600 MG/1
TABLET ORAL
Qty: 90 TABLET | OUTPATIENT
Start: 2023-01-12

## 2023-01-12 ASSESSMENT — ENCOUNTER SYMPTOMS
EYE DISCHARGE: 0
NAUSEA: 0
VOMITING: 0
ABDOMINAL PAIN: 0
COUGH: 0
CHEST TIGHTNESS: 0
RHINORRHEA: 0
EYE REDNESS: 0
BACK PAIN: 0
SORE THROAT: 0

## 2023-01-12 NOTE — ED PROVIDER NOTES
[address] [address] [place] [facility] [address]  Dept: [phone]  Loc: [phone]  eMERGENCYdEPARTMENT eNCOUnter      Pt Name: Zoe Ahumada  MRN: [id number]  [place] 1980  Date of evaluation: 1/11/2023  Provider:MELLY Caldwell CNP    Independently evaluated by the advanced practice provider    CHIEF COMPLAINT       Chief Complaint   Patient presents with    Leg Swelling     R leg swelling that began a week ago; states it began with her knee swelling and then progressed to her foot and ankle; NKI; went to Pcp today and they gave her a refill on her BP meds and took bloodwork         CRITICAL CARE TIME       HISTORY OF PRESENT ILLNESS  (Location/Symptom, Timing/Onset, Context/Setting, Quality, Duration,Modifying Factors, Severity.)   Zoe Ahumada is a 38 y.o. female who presents to the emergency department PMHx: Hypertension, obesity, arthritis  SHX: Hysterectomy    Employed  Medically insured  Lives at home  No anticoagulation therapy    HPI provided by the patient    Patient presents emergency department complaining of nontraumatic right lower extremity swelling. Started around the knee, spread to the calf and to the foot. Started about a week ago. Seen her PCP. PCP seen her today, gave her prescriptions for outpatient right knee x-ray, right foot x-ray and an outpatient venous Doppler study. Patient left the doctor's office around 3:00 went to work. She has a sitting type of job, and noticed that the swelling in the right leg especially the lower leg and foot became more. So she came to the emergency department. Patient denies any recent trauma. She denies any recent travel. She also denies any known family history of thromboembolic or clotting disorders.   She denies any personal history of thromboembolic or clotting disorders    Patient denies smoking  Patient denies HRT therapy      Denies chest pain, shortness of breath. Self endorses that her blood pressure is high and she was started on losartan today by her PCP. Nursing Notes were reviewedand agreed with or any disagreements were addressed in the HPI. REVIEW OF SYSTEMS    (2-9 systems for level 4, 10 or more for level 5)     Review of Systems   Constitutional: Negative. Respiratory:  Negative for cough, chest tightness and shortness of breath. Cardiovascular:  Positive for leg swelling. Negative for chest pain. Gastrointestinal: Negative. Genitourinary: Negative. Musculoskeletal:  Positive for arthralgias. Except as noted above the remainder of the review of systems was reviewed and negative. PAST MEDICAL HISTORY         Diagnosis Date    Abnormal Pap smear of cervix     Arthritis     Hyperlipidemia 2018    Hypertension     Iron deficiency 2019    Morbid obesity with BMI of 40.0-44.9, adult ([facility] [address] 2018       SURGICAL HISTORY           Procedure Laterality Date    HYSTERECTOMY ABDOMINAL Bilateral 2019    DAVINCI ROBOTIC TOTAL LAPAROSCOPIC HYSTERECTOMY WITH BILATERAL SALPINGECTOMY performed by Anusha Jensen DO at [address] W/CERVIX IF PRESENT N/A 2018    CERVICAL COLD KNIFE CONIZATION, COLPOSCOPY UNDER ANESTHESIA performed by Anusha Jensen DO at [address]       CURRENT MEDICATIONS     [unfilled]    ALLERGIES     Ace inhibitors    FAMILY HISTORY           Problem Relation Age of Onset    Diabetes Mother     High Blood Pressure Mother     Cancer Father         pancreatic     Family Status   Relation Name Status    Mother      Father          SOCIAL HISTORY      reports that she has never smoked. She has never used smokeless tobacco. She reports current alcohol use. She reports that she does not use drugs.     PHYSICAL EXAM    (up to 7 for level 4, 8 or more for level 5)     ED Triage Vitals Enc Vitals Group      BP       Pulse       Resp       Temp       Temp src       SpO2       Weight       Height       Head Circumference       Peak Flow       Pain Score       Pain Loc       Pain Edu? Excl. in [address]? Physical Exam  Vitals and nursing note reviewed. Constitutional:       Appearance: She is obese. Cardiovascular:      Rate and Rhythm: Normal rate and regular rhythm. Pulses: Normal pulses. Heart sounds: Normal heart sounds. Pulmonary:      Effort: Pulmonary effort is normal.      Breath sounds: Normal breath sounds. Musculoskeletal:         General: Swelling and tenderness present. Right lower leg: No edema. Left lower leg: No edema. Skin:     General: Skin is warm and dry. Capillary Refill: Capillary refill takes less than 2 seconds. Neurological:      General: No focal deficit present. Mental Status: She is alert and oriented to person, place, and time. DIAGNOSTIC RESULTS     EKG: All EKG's are interpreted by the Emergency Department Physician who either signs or Co-signs this chart in the absence of a cardiologist.    RADIOLOGY:   Non-plain film images such as CT, Ultrasound and MRI are read by the radiologist. Plain radiographic images are visualized and preliminarilyinterpreted by the emergency physician with the below findings:    Interpretation per the Radiologist below,if available at the time of this note:    XR FOOT RIGHT (MIN 3 VIEWS)   Final Result   Mild forefoot swelling. No acute osseous abnormality identified. XR KNEE RIGHT (1-2 VIEWS)   Final Result   Mild osteoarthritic changes in the knee and a small suprapatellar effusion   with no acute bony abnormality.                LABS:  Labs Reviewed   BASIC METABOLIC PANEL W/ REFLEX TO MG FOR LOW K - Abnormal; Notable for the following components:       Result Value    Sodium 135 (*)     All other components within normal limits   CBC WITH AUTO DIFFERENTIAL - Abnormal; Notable for the following components:    Hemoglobin 11.9 (*)     All other components within normal limits   PROTIME-INR   D-DIMER, QUANTITATIVE       All other labs were within normal range or not returned as of this dictation. EMERGENCY DEPARTMENT COURSE and DIFFERENTIAL DIAGNOSIS/MDM:   Vitals:    Vitals:    01/11/23 2100 01/11/23 2315 01/11/23 2322   BP: (!) 201/93 (!) 204/96    Pulse: 73     Resp: 18  18   Temp: 98.2 °F (36.8 °C)     TempSrc: Oral     SpO2: 99%  99%   Weight: 238 lb 5.1 oz (108.1 kg)     Height: 5' (1.524 m)         MDM  Patient was seen and evaluated per myself. Dr. Carie Qureshi present available for consultation as needed      DDx: Knee effusion associated from arthritis, DVT    Clinical exam: Patient is hypertensive. However there is no encephalopathy. Respirations are easy regular nonlabored. Lung sounds are clear. Heart tones unremarkable. Right lower extremity is larger and swollen in comparison to the left. However this patient is morbidly obese. There is no evidence of pitting edema. She had no popliteal tenderness, no calf tenderness. There is some swelling of the right foot without erythema pain or tenderness. Cap refills less than 2 seconds. No evidence of vascular compromise or ischemic limb. I did not see any evidence of varicosities, erythema, roping, ecchymosis. X-rays of the right knee, right foot will be obtained. Laboratory studies will be obtained including a D-dimer. If D-dimer is elevated patient will be started on anticoagulation therapy per protocol and she can continue to get her outpatient DVT study. Blood pressure retaken per myself 204/96. Patient's not having chest pain, shortness of breath. No evidence consistent with hypertensive emergency or urgency. Laboratory studies are unremarkable. I did not see any clinical indication that warranted a stat CTA of the chest.    Patient will be given a dose of amlodipine.   She has her hydrochlorothiazide as well as her losartan to continue taking. SDM: X-ray of the right foot is unremarkable. X-ray of the right knee indicating an effusion with osteoarthritis. Her D-dimer is not elevated. Patient already has a prescription to get an outpatient right lower extremity venous Doppler study tomorrow. I did discuss all this with her. She is fine with getting the Doppler study getting her results, and following up with her primary care if she needs anticoagulation therapy. CONSULTS:  None    PROCEDURES:  Procedures    FINAL IMPRESSION      1. Acute pain of right knee    2. Pain and swelling of lower extremity, right    3. Osteoarthritis of right knee, unspecified osteoarthritis type    4. Effusion of right knee    5. Uncontrolled hypertension          DISPOSITION/PLAN   [unfilled]    PATIENT REFERRED TO:  Verona Smith MD  [address] [place] [address] [place]    Schedule an appointment as soon as possible for a visit       DISCHARGE MEDICATIONS:  Discharge Medication List as of 1/11/2023 11:19 PM          (Please note that portions of this note were completed with a voice recognition program.  Efforts were made to edit the dictations but occasionally words are mis-transcribed.)    Miguel Handler MELLY Rodriguez CNP      This dictation was performed with a verbal recognition program (DRAGON) and it was checked for errors. It is possible that there are still dictated errors within this office note. If so, please bring any errors to my attention for an addendum. All efforts were made to ensure that this office note is accurate.

## 2023-01-12 NOTE — ED NOTES
.Pt discharged at this time. Discharge instructions and medications reviewed,  Questions were answered. PT verbalized understanding. VSS, Afebrile. Follow up appointments were discussed.          Randy Baker RN  01/11/23 [id number]

## 2023-01-12 NOTE — TELEPHONE ENCOUNTER
Last office visit 1/11/23          Future Appointments   Date Time Provider Lola Motta   1/17/2023  2:00 PM Michelle Shea MD [facility] [place] - [place]   6/28/2023  8:10 AM DO [facility]

## 2023-01-12 NOTE — DISCHARGE INSTRUCTIONS
Please make sure that you get your right lower extremity, right lower leg venous Doppler study scheduled tomorrow. You have the outpatient requisition and the phone number provided to you. Ice and elevate the right knee and lower leg.

## 2023-01-12 NOTE — TELEPHONE ENCOUNTER
Pt was scheduled for vascular study right venous. It needs to say VL, not US. Order was put ion as ultrasound. PLease change.

## 2023-01-13 ASSESSMENT — ENCOUNTER SYMPTOMS
CHEST TIGHTNESS: 0
SHORTNESS OF BREATH: 0
COUGH: 0
GASTROINTESTINAL NEGATIVE: 1

## 2023-01-16 NOTE — PROGRESS NOTES
Petra Marmolejo (:  1980) is a 38 y.o. female,Established patient, here for evaluation of the following chief complaint(s):  New Patient    Chronic health issues include include hypertension, obesity, foot pain, macromastia. She was evaluated on  for acute pain in the right knee and right foot pain. She also had a poorly controlled blood pressure, was advised to take Hyzaar     ASSESSMENT/PLAN:  1. Acute pain of right knee-   Persistent pain. X-ray of knee 2022 Mild osteoarthritic changes in the knee and a small suprapatellar effusion. Conservative management with support with knee brace, physical therapy, ibuprofen as needed for pain management. Patient advised to use sparingly and use Tylenol first.  Orthopedics referral made. -     Elastic Bandages & Supports (KNEE BRACE) MISC; DAILY Starting Tue 2023, Disp-1 each, R-3, Normal  -     ibuprofen (ADVIL;MOTRIN) 800 MG tablet; Take 1 tablet by mouth 3 times daily as needed for Pain, Disp-30 tablet, R-0Normal  -     [facility] - [place] MD Ketty, Orthopedic Surgery (Hip; Knee; Shoulder; Elbow), [facility]  2. Primary hypertension-poorly controlled, will start her on amlodipine 5 mg along with Hyzaar 50/12.5 mg daily. Low-salt diet advised for the patient. -     losartan-hydroCHLOROthiazide (HYZAAR) 50-12.5 MG per tablet; Take 1 tablet by mouth daily, Disp-90 tablet, R-1Normal  -     amLODIPine (NORVASC) 5 MG tablet; Take 1 tablet by mouth daily, Disp-30 tablet, R-5Normal  3. Sleep apnea, unspecified type-concern for apneic episode at night. She also has uncontrolled hypertension and intermittent palpitation. Will benefit with sleep study. -     Germain Hoyt MD, Sleep Medicine, [facility]  4. Mixed hyperlipidemia-on diet control, will repeat her labs. Continue with dietary modification.    -     Lipid, Fasting;  Future    Return in about 3 months (around 4/17/2023). Subjective   SUBJECTIVE/OBJECTIVE:  HPI    Review of Systems       Objective   Physical Exam  Vitals reviewed. Constitutional:       Appearance: Normal appearance. HENT:      Head: Normocephalic. Eyes:      Extraocular Movements: Extraocular movements intact. Pupils: Pupils are equal, round, and reactive to light. Cardiovascular:      Rate and Rhythm: Normal rate. Heart sounds: Murmur heard. Pulmonary:      Effort: Pulmonary effort is normal.      Breath sounds: Normal breath sounds. Abdominal:      General: Bowel sounds are normal.      Palpations: Abdomen is soft. Musculoskeletal:         General: Normal range of motion. Skin:     General: Skin is warm. Neurological:      General: No focal deficit present. Mental Status: She is alert and oriented to person, place, and time. Mental status is at baseline. Psychiatric:         Mood and Affect: Mood normal.            Please note that this chart was generated using dragon dictation software. Although every effort was made to ensure the accuracy of this automated transcription, some errors in transcription may have occurred. An electronic signature was used to authenticate this note.     --Luis Eduardo Narayanan MD

## 2023-01-17 ENCOUNTER — OFFICE VISIT (OUTPATIENT)
Dept: INTERNAL MEDICINE CLINIC | Age: 43
End: 2023-01-17
Payer: COMMERCIAL

## 2023-01-17 VITALS
TEMPERATURE: 98.8 F | SYSTOLIC BLOOD PRESSURE: 185 MMHG | DIASTOLIC BLOOD PRESSURE: 100 MMHG | OXYGEN SATURATION: 97 % | WEIGHT: 232.8 LBS | BODY MASS INDEX: 45.47 KG/M2 | HEART RATE: 93 BPM

## 2023-01-17 DIAGNOSIS — G47.30 SLEEP APNEA, UNSPECIFIED TYPE: ICD-10-CM

## 2023-01-17 DIAGNOSIS — I10 PRIMARY HYPERTENSION: ICD-10-CM

## 2023-01-17 DIAGNOSIS — E78.2 MIXED HYPERLIPIDEMIA: ICD-10-CM

## 2023-01-17 DIAGNOSIS — M25.561 ACUTE PAIN OF RIGHT KNEE: Primary | ICD-10-CM

## 2023-01-17 PROCEDURE — 3080F DIAST BP >= 90 MM HG: CPT | Performed by: STUDENT IN AN ORGANIZED HEALTH CARE EDUCATION/TRAINING PROGRAM

## 2023-01-17 PROCEDURE — 99214 OFFICE O/P EST MOD 30 MIN: CPT | Performed by: STUDENT IN AN ORGANIZED HEALTH CARE EDUCATION/TRAINING PROGRAM

## 2023-01-17 PROCEDURE — 3077F SYST BP >= 140 MM HG: CPT | Performed by: STUDENT IN AN ORGANIZED HEALTH CARE EDUCATION/TRAINING PROGRAM

## 2023-01-17 RX ORDER — LOSARTAN POTASSIUM AND HYDROCHLOROTHIAZIDE 12.5; 5 MG/1; MG/1
1 TABLET ORAL DAILY
Qty: 90 TABLET | Refills: 1 | Status: SHIPPED | OUTPATIENT
Start: 2023-01-17

## 2023-01-17 RX ORDER — AMLODIPINE BESYLATE 5 MG/1
5 TABLET ORAL DAILY
Qty: 30 TABLET | Refills: 5 | Status: SHIPPED | OUTPATIENT
Start: 2023-01-17 | End: 2023-01-17

## 2023-01-17 RX ORDER — IBUPROFEN 800 MG/1
800 TABLET ORAL 3 TIMES DAILY PRN
Qty: 30 TABLET | Refills: 0 | Status: SHIPPED | OUTPATIENT
Start: 2023-01-17

## 2023-01-17 RX ORDER — AMLODIPINE BESYLATE 5 MG/1
5 TABLET ORAL DAILY
Qty: 90 TABLET | Refills: 1 | Status: SHIPPED | OUTPATIENT
Start: 2023-01-17

## 2023-01-17 NOTE — TELEPHONE ENCOUNTER
Last ov 01 17 23  Future Appointments   Date Time Provider Lola Motta   1/26/2023  2:30 PM SCHEDULE, [facility] 2 [facility]   6/28/2023  8:10 AM DO SOLIS An [facility] RAMIREZ

## 2023-01-18 ENCOUNTER — OFFICE VISIT (OUTPATIENT)
Dept: ORTHOPEDIC SURGERY | Age: 43
End: 2023-01-18

## 2023-01-18 ENCOUNTER — TELEPHONE (OUTPATIENT)
Dept: ORTHOPEDIC SURGERY | Age: 43
End: 2023-01-18

## 2023-01-18 VITALS — HEIGHT: 60 IN | WEIGHT: 233 LBS | BODY MASS INDEX: 45.75 KG/M2

## 2023-01-18 DIAGNOSIS — M17.0 PRIMARY OSTEOARTHRITIS OF BOTH KNEES: Primary | ICD-10-CM

## 2023-01-18 RX ORDER — TRIAMCINOLONE ACETONIDE 40 MG/ML
80 INJECTION, SUSPENSION INTRA-ARTICULAR; INTRAMUSCULAR ONCE
Status: COMPLETED | OUTPATIENT
Start: 2023-01-18 | End: 2023-01-18

## 2023-01-18 RX ORDER — BUPIVACAINE HYDROCHLORIDE 2.5 MG/ML
3 INJECTION, SOLUTION INFILTRATION; PERINEURAL ONCE
Status: COMPLETED | OUTPATIENT
Start: 2023-01-18 | End: 2023-01-18

## 2023-01-18 RX ADMIN — BUPIVACAINE HYDROCHLORIDE 7.5 MG: 2.5 INJECTION, SOLUTION INFILTRATION; PERINEURAL at 10:29

## 2023-01-18 RX ADMIN — TRIAMCINOLONE ACETONIDE 80 MG: 40 INJECTION, SUSPENSION INTRA-ARTICULAR; INTRAMUSCULAR at 10:29

## 2023-01-18 NOTE — PROGRESS NOTES
Peewee Lemons  [id number]  January 18, 2023    Chief Complaint   Patient presents with    Knee Pain     Bilateral       History: The patient is a 58-year-old female who is here for evaluation of both knees. Her right knee seems to bother her more than the left. The right knee began to bother her approximately 3 to 4 weeks ago. She cannot recall a specific injury. She has been having swelling. 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 She has difficulty sitting for an extended period of time. The left knee began to bother her over the past few days. She has been taking over-the-counter ibuprofen without much relief. Patient does report injuring the left knee back in 2021 when her knee hit a pallet. She did see an orthopedist at that time and the issue ultimately resolved with therapy. This is a consult from Lillie Ramírez MD for bilateral knee pain. The patient's  past medical history, medications, allergies,  family history, social history, and have been reviewed, and dated and are recorded in the chart. Pertinent items are noted in HPI. Review of systems reviewed from Pertinent History Form dated on 1/18 and available in the patient's chart under the Media tab. Vitals:  Ht 5' (1.524 m)   Wt 233 lb (105.7 kg)   LMP 08/16/2019   BMI 45.50 kg/m²     Physical: Ms. Peewee Lemons appears well, she is in no apparent distress, she demonstrates appropriate mood & affect. She is alert and oriented to person, place and time. Examination of the bilateral lower extremity reveals no pain with range of motion of the hips. She has generalized tenderness to palpation about the joint line of the bilateral knee. Range of motion is from 0 degrees to 115 degrees bilaterally. Strength is 4+ to 5/5 for all muscle groups about the bilateral knee. There is patellofemoral crepitus with range of motion of the bilateral knee.  Varus and valgus stressing of the knees reveals no evidence of instability bilaterally. There is a medium effusion in the right knee. Anterior drawer and Lachman are negative bilaterally. Examination of the skin reveals no rashes, ulceration, or lesion, bilaterally in the lower extremities. Sensation to both lower extremities is grossly intact. Exam of both feet reveals pedal pulses intact and brisk cap refill. Patient is able to dorsiflex and wiggle all toes. Deep tendon reflexes of the lower extremities are normal and symmetric. X-rays: 3 views of the right knee obtained in the office today were extensively reviewed. 2 views of the right knee obtained earlier this month were extensively reviewed. There is moderate osteoarthritis involving the medial compartment and patellofemoral compartment. 4 views of the left knee obtained in the office today were extensively reviewed. There is moderate osteoarthritis. Again the changes are most significant medially and within the patellofemoral compartment. Impression: Bilateral knee osteoarthritis    Plan: The patient was explained the risks, benefits and alternatives to injection. The patient understood the risks and benefits and agreed to proceed. At this time, the right knee was aspirated under sterile conditions. After a Betadine prep of the joint, the right knee was aspirated. We obtained approximately 25 mL of yellowish clear fluid consistent with arthritis. Using the same needle, we then injected 3 cc of 0.25% Marcaine and 2 cc of 40 mg Kenalog into the knee. The patient tolerated the injection rather well. The patient was instructed to follow up for any signs of infection. Natural history and expected course discussed. Questions answered. Reduction in offending activity. OTC analgesics as needed. The patient will follow up with me in 6 weeks. The patient does have a prescription of ibuprofen 800 mg at the pharmacy. She will start taking the ibuprofen as directed.   If she continues to have the left knee pain, we will consider a left knee injection. She was encouraged on a weight loss program.  We instructed the patient on a home exercise program with specific attention to quadricep strengthening. Orders Placed This Encounter   Procedures    XR KNEE RIGHT (3 VIEWS)     Rm 24.  Has Lateral. 3V Rt Knee AP Standing     Standing Status:   Future     Number of Occurrences:   1     Standing Expiration Date:   1/18/2024     Order Specific Question:   Reason for exam:     Answer:   Knee Pain    XR KNEE LEFT (MIN 4 VIEWS)     Rm 24     Standing Status:   Future     Number of Occurrences:   1     Standing Expiration Date:   2/18/2023    OK ARTHROCENTESIS ASPIR&/INJ MAJOR JT/BURSA W/O US

## 2023-01-25 ENCOUNTER — TELEPHONE (OUTPATIENT)
Dept: INTERNAL MEDICINE CLINIC | Age: 43
End: 2023-01-25

## 2023-01-25 NOTE — TELEPHONE ENCOUNTER
Pt has seen Dr Katina Freedman. Had high BP, she was put on BP meds. Has always had a \"flutter\" but heart is beating fast and has chest tightness. Only happens when it rains. Thinks her arthritis may be a factor. What should she do?     Please call her cell [phone]

## 2023-02-15 DIAGNOSIS — M25.561 ACUTE PAIN OF RIGHT KNEE: ICD-10-CM

## 2023-02-16 RX ORDER — IBUPROFEN 800 MG/1
TABLET ORAL
Qty: 30 TABLET | Refills: 0 | Status: SHIPPED | OUTPATIENT
Start: 2023-02-16

## 2023-03-28 ENCOUNTER — OFFICE VISIT (OUTPATIENT)
Dept: ORTHOPEDIC SURGERY | Age: 43
End: 2023-03-28

## 2023-03-28 VITALS — BODY MASS INDEX: 45.75 KG/M2 | WEIGHT: 233 LBS | HEIGHT: 60 IN

## 2023-03-28 DIAGNOSIS — M17.11 PRIMARY OSTEOARTHRITIS OF RIGHT KNEE: Primary | ICD-10-CM

## 2023-03-28 DIAGNOSIS — M17.0 PRIMARY OSTEOARTHRITIS OF BOTH KNEES: ICD-10-CM

## 2023-03-28 RX ORDER — TRIAMCINOLONE ACETONIDE 40 MG/ML
80 INJECTION, SUSPENSION INTRA-ARTICULAR; INTRAMUSCULAR ONCE
Status: COMPLETED | OUTPATIENT
Start: 2023-03-28 | End: 2023-03-28

## 2023-03-28 RX ORDER — BUPIVACAINE HYDROCHLORIDE 2.5 MG/ML
3 INJECTION, SOLUTION INFILTRATION; PERINEURAL ONCE
Status: COMPLETED | OUTPATIENT
Start: 2023-03-28 | End: 2023-03-28

## 2023-03-28 RX ADMIN — TRIAMCINOLONE ACETONIDE 80 MG: 40 INJECTION, SUSPENSION INTRA-ARTICULAR; INTRAMUSCULAR at 15:37

## 2023-03-28 RX ADMIN — BUPIVACAINE HYDROCHLORIDE 7.5 MG: 2.5 INJECTION, SOLUTION INFILTRATION; PERINEURAL at 15:37

## 2023-03-28 RX ADMIN — TRIAMCINOLONE ACETONIDE 80 MG: 40 INJECTION, SUSPENSION INTRA-ARTICULAR; INTRAMUSCULAR at 15:38

## 2023-03-28 NOTE — PROGRESS NOTES
Exam of both feet reveals pedal pulses intact and brisk cap refill. Patient is able to dorsiflex and wiggle all toes. Deep tendon reflexes of the lower extremities are normal and symmetric. X-rays: 3 views of the right knee obtained in the office at last visit were extensively reviewed. 2 views of the right knee obtained earlier this month were extensively reviewed. There is moderate osteoarthritis involving the medial compartment and patellofemoral compartment. 4 views of the left knee obtained in the office at last visit were extensively reviewed. There is moderate osteoarthritis. Again the changes are most significant medially and within the patellofemoral compartment. Impression: Bilateral knee osteoarthritis    Plan: The patient was explained the risks, benefits and alternatives to injection. The patient understood the risks and benefits and agreed to proceed. At this time, the bilateral knees were injected under sterile conditions. After a Betadine prep of the joints, 3 cc of 0.25% Marcaine and 2 cc of 40 mg Kenalog were injected into the knees. The patient tolerated the injections rather well. The patient was instructed to follow up for any signs of infection. Natural history and expected course discussed. Questions answered. Reduction in offending activity. OTC analgesics as needed. The patient will follow up with me in 3 months. The patient was given an economy hinged knee brace for her right knee. She was given a prescription for ibuprofen 800 mg twice a day with food. She will continue to work on weight loss. If the patient continues to have pain, we will consider Visco supplement injections. We will also consider repeat injections. She was encouraged to stop doing leg presses and squatting. We instructed her on a home exercise program to work on range of motion and strengthening. No orders of the defined types were placed in this encounter.

## 2023-04-03 ENCOUNTER — OFFICE VISIT (OUTPATIENT)
Dept: INTERNAL MEDICINE CLINIC | Age: 43
End: 2023-04-03
Payer: COMMERCIAL

## 2023-04-03 VITALS
TEMPERATURE: 98.2 F | HEART RATE: 74 BPM | DIASTOLIC BLOOD PRESSURE: 96 MMHG | SYSTOLIC BLOOD PRESSURE: 178 MMHG | BODY MASS INDEX: 46.48 KG/M2 | OXYGEN SATURATION: 99 % | WEIGHT: 238 LBS

## 2023-04-03 DIAGNOSIS — G89.29 CHRONIC PAIN OF BOTH KNEES: Primary | ICD-10-CM

## 2023-04-03 DIAGNOSIS — M25.562 CHRONIC PAIN OF BOTH KNEES: Primary | ICD-10-CM

## 2023-04-03 DIAGNOSIS — M25.561 CHRONIC PAIN OF BOTH KNEES: Primary | ICD-10-CM

## 2023-04-03 DIAGNOSIS — I10 PRIMARY HYPERTENSION: ICD-10-CM

## 2023-04-03 PROCEDURE — 99213 OFFICE O/P EST LOW 20 MIN: CPT | Performed by: NURSE PRACTITIONER

## 2023-04-03 PROCEDURE — 3080F DIAST BP >= 90 MM HG: CPT | Performed by: NURSE PRACTITIONER

## 2023-04-03 PROCEDURE — 3077F SYST BP >= 140 MM HG: CPT | Performed by: NURSE PRACTITIONER

## 2023-04-03 SDOH — ECONOMIC STABILITY: FOOD INSECURITY: WITHIN THE PAST 12 MONTHS, THE FOOD YOU BOUGHT JUST DIDN'T LAST AND YOU DIDN'T HAVE MONEY TO GET MORE.: NEVER TRUE

## 2023-04-03 SDOH — ECONOMIC STABILITY: HOUSING INSECURITY
IN THE LAST 12 MONTHS, WAS THERE A TIME WHEN YOU DID NOT HAVE A STEADY PLACE TO SLEEP OR SLEPT IN A SHELTER (INCLUDING NOW)?: NO

## 2023-04-03 SDOH — ECONOMIC STABILITY: INCOME INSECURITY: HOW HARD IS IT FOR YOU TO PAY FOR THE VERY BASICS LIKE FOOD, HOUSING, MEDICAL CARE, AND HEATING?: NOT VERY HARD

## 2023-04-03 SDOH — ECONOMIC STABILITY: FOOD INSECURITY: WITHIN THE PAST 12 MONTHS, YOU WORRIED THAT YOUR FOOD WOULD RUN OUT BEFORE YOU GOT MONEY TO BUY MORE.: NEVER TRUE

## 2023-04-03 NOTE — PROGRESS NOTES
mmol/L Final    Potassium reflex Magnesium 01/11/2023 4.2  3.5 - 5.1 mmol/L Final    Chloride 01/11/2023 101  99 - 110 mmol/L Final    CO2 01/11/2023 25  21 - 32 mmol/L Final    Anion Gap 01/11/2023 9  3 - 16 Final    Glucose 01/11/2023 81  70 - 99 mg/dL Final    BUN 01/11/2023 14  7 - 20 mg/dL Final    Creatinine 01/11/2023 0.7  0.6 - 1.1 mg/dL Final    Est, Glom Filt Rate 01/11/2023 >60  >60 Final    Comment: Pediatric calculator link  Tammi.at. org/professionals/kdoqi/gfr_calculatorped  Effective Oct 3, 2022  These results are not intended for use in patients  <25years of age. eGFR results are calculated without  a race factor using the 2021 CKD-EPI equation. Careful  clinical correlation is recommended, particularly when  comparing to results calculated using previous equations. The CKD-EPI equation is less accurate in patients with  extremes of muscle mass, extra-renal metabolism of  creatinine, excessive creatinine ingestion, or following  therapy that affects renal tubular secretion.       Calcium 01/11/2023 9.2  8.3 - 10.6 mg/dL Final    WBC 01/11/2023 10.5  4.0 - 11.0 K/uL Final    RBC 01/11/2023 4.57  4.00 - 5.20 M/uL Final    Hemoglobin 01/11/2023 11.9 (L)  12.0 - 16.0 g/dL Final    Hematocrit 01/11/2023 37.5  36.0 - 48.0 % Final    MCV 01/11/2023 82.0  80.0 - 100.0 fL Final    MCH 01/11/2023 26.1  26.0 - 34.0 pg Final    MCHC 01/11/2023 31.8  31.0 - 36.0 g/dL Final    RDW 01/11/2023 14.0  12.4 - 15.4 % Final    Platelets 29/09/5557 399  135 - 450 K/uL Final    MPV 01/11/2023 7.0  5.0 - 10.5 fL Final    Neutrophils % 01/11/2023 65.5  % Final    Lymphocytes % 01/11/2023 25.7  % Final    Monocytes % 01/11/2023 5.3  % Final    Eosinophils % 01/11/2023 2.9  % Final    Basophils % 01/11/2023 0.6  % Final    Neutrophils Absolute 01/11/2023 6.9  1.7 - 7.7 K/uL Final    Lymphocytes Absolute 01/11/2023 2.7  1.0 - 5.1 K/uL Final    Monocytes Absolute 01/11/2023 0.6  0.0 - 1.3 K/uL Final    Eosinophils

## 2023-04-03 NOTE — PATIENT INSTRUCTIONS
See orthopedics  Follow up with PCP for BP around 4/17/2023 (per last visit), take BP medications at least one hour prior to that visit.

## 2023-05-01 ENCOUNTER — TELEPHONE (OUTPATIENT)
Dept: INTERNAL MEDICINE CLINIC | Age: 43
End: 2023-05-01

## 2023-05-19 ENCOUNTER — TELEPHONE (OUTPATIENT)
Dept: INTERNAL MEDICINE CLINIC | Age: 43
End: 2023-05-19

## 2023-05-19 DIAGNOSIS — M05.9 RHEUMATOID ARTHRITIS WITH POSITIVE RHEUMATOID FACTOR, INVOLVING UNSPECIFIED SITE (HCC): Primary | ICD-10-CM

## 2023-06-13 NOTE — PROGRESS NOTES
Bowel prep instructions for surgery reviewed with patient and patient given copy of instruction sheet. Patient has chosen magnesium citrate bowel prep. Bel Hernandez    Instructions for Pre-Operative Bowel Preparation    On the day before your procedure:     Begin a clear liquid diet when you wake up. You may have:  - Water  - Clear chicken or beef broth  - Tea or coffee (without milk or non-dairy creamer)  - Gatorade or PowerAde  - Clear sodas  - Jell-O (without added fruits or toppings)  - Popsicles  - Strained fruit juices without pulp (apple, white grape, lemonade)  - [place] ices    You may use salt, pepper and sugar as needed. DO NOT eat food, drink milk, coffee creamer, cream soups, oatmeal, orange or tomato juice. ? Do not eat or drink anything by mouth, smoke or chew gum after midnight. Take only medications that were approved by your physician. Choose one of the following to be completed the day before surgery:    o Magnesium Citrate (No prescription necessary)  Noon: 240 mL of magnesium citrate  8 pm: begin saline enemas until return is clear    o GoLytely  Noon: Drink 4 liters of GoLytely by mouth, 8 oz. every 10 to 15 minutes until gone or bowel movements are watery and clear. Mix powder with 3 liters of water and 1 liter of apple juice if desired for flavor. (This may take 3 to 4 hours to complete)    Note:     You will need to finish all of the prep.  It is best to be home when you begin the prep. Bowel movements may occur 30 minutes to 6 hours after beginning the prep.  Bowel movements may be frequent and watery.  Do not take any other medications within 1 hour of starting the prep.  Abdominal bloating or distention may occur before the first bowel movement. If your abdominal distention or discomfort continues while you are drinking the solution, stop drinking the solution temporarily or drink each portion at longer intervals until your symptoms disappear.    If you have anal Known hx of pulmonary arterial hypertension  · CTA PE:  Dilatation of the main pulmonary artery noted suggestive of pulmonary arterial hypertension  discomfort, A&D ointment, Desitin, or petroleum jelly can be applied to the affected area. Non-alcohol containing wipes (baby wipes, wet wipes) can be used for cleansing.

## 2023-06-28 ENCOUNTER — OFFICE VISIT (OUTPATIENT)
Dept: OBGYN CLINIC | Age: 43
End: 2023-06-28
Payer: COMMERCIAL

## 2023-06-28 ENCOUNTER — HOSPITAL ENCOUNTER (OUTPATIENT)
Age: 43
Discharge: HOME | End: 2023-06-28
Payer: COMMERCIAL

## 2023-06-28 VITALS
BODY MASS INDEX: 47.38 KG/M2 | DIASTOLIC BLOOD PRESSURE: 78 MMHG | WEIGHT: 242.6 LBS | TEMPERATURE: 97.7 F | HEART RATE: 97 BPM | SYSTOLIC BLOOD PRESSURE: 128 MMHG

## 2023-06-28 DIAGNOSIS — Z98.890 S/P ROBOT-ASSISTED SURGICAL PROCEDURE: ICD-10-CM

## 2023-06-28 DIAGNOSIS — Z11.3 SCREEN FOR STD (SEXUALLY TRANSMITTED DISEASE): ICD-10-CM

## 2023-06-28 DIAGNOSIS — Z53.8 PAP SMEAR OF CERVIX NOT NEEDED: ICD-10-CM

## 2023-06-28 DIAGNOSIS — Z01.419 WOMEN'S ANNUAL ROUTINE GYNECOLOGICAL EXAMINATION: ICD-10-CM

## 2023-06-28 DIAGNOSIS — Z90.710 HISTORY OF ROBOT-ASSISTED LAPAROSCOPIC HYSTERECTOMY: ICD-10-CM

## 2023-06-28 DIAGNOSIS — Z87.410 HISTORY OF CERVICAL DYSPLASIA: ICD-10-CM

## 2023-06-28 DIAGNOSIS — Z01.419 WOMEN'S ANNUAL ROUTINE GYNECOLOGICAL EXAMINATION: Primary | ICD-10-CM

## 2023-06-28 LAB — HCV AB SERPL QL IA: NORMAL

## 2023-06-28 PROCEDURE — 86702 HIV-2 ANTIBODY: CPT

## 2023-06-28 PROCEDURE — 86701 HIV-1ANTIBODY: CPT

## 2023-06-28 PROCEDURE — 99396 PREV VISIT EST AGE 40-64: CPT | Performed by: OBSTETRICS & GYNECOLOGY

## 2023-06-28 PROCEDURE — 36415 COLL VENOUS BLD VENIPUNCTURE: CPT | Performed by: OBSTETRICS & GYNECOLOGY

## 2023-06-28 PROCEDURE — 86780 TREPONEMA PALLIDUM: CPT

## 2023-06-28 PROCEDURE — 3078F DIAST BP <80 MM HG: CPT | Performed by: OBSTETRICS & GYNECOLOGY

## 2023-06-28 PROCEDURE — 87390 HIV-1 AG IA: CPT

## 2023-06-28 PROCEDURE — 86803 HEPATITIS C AB TEST: CPT

## 2023-06-28 PROCEDURE — 3074F SYST BP LT 130 MM HG: CPT | Performed by: OBSTETRICS & GYNECOLOGY

## 2023-06-28 ASSESSMENT — ENCOUNTER SYMPTOMS
ABDOMINAL DISTENTION: 0
SHORTNESS OF BREATH: 0
ABDOMINAL PAIN: 0
CONSTIPATION: 0
DIARRHEA: 0
VOMITING: 0
NAUSEA: 0

## 2023-06-29 LAB
CANDIDA DNA VAG QL NAA+PROBE: ABNORMAL
G VAGINALIS DNA SPEC QL NAA+PROBE: ABNORMAL
HIV 1+2 AB+HIV1 P24 AG SERPL QL IA: NORMAL
HIV 2 AB SERPL QL IA: NORMAL
HIV1 AB SERPL QL IA: NORMAL
HIV1 P24 AG SERPL QL IA: NORMAL
REAGIN+T PALLIDUM IGG+IGM SERPL-IMP: NORMAL
T VAGINALIS DNA VAG QL NAA+PROBE: ABNORMAL

## 2023-06-30 LAB
C TRACH DNA CVX QL NAA+PROBE: NEGATIVE
N GONORRHOEA DNA CERV MUCUS QL NAA+PROBE: NEGATIVE

## 2023-07-03 ENCOUNTER — TELEPHONE (OUTPATIENT)
Dept: OBGYN CLINIC | Age: 43
End: 2023-07-03

## 2023-07-04 RX ORDER — METRONIDAZOLE 500 MG/1
500 TABLET ORAL 2 TIMES DAILY
Qty: 14 TABLET | Refills: 0 | OUTPATIENT
Start: 2023-07-04 | End: 2023-07-11

## 2023-09-25 ENCOUNTER — HOSPITAL ENCOUNTER (EMERGENCY)
Age: 43
Discharge: HOME | End: 2023-09-25
Attending: STUDENT IN AN ORGANIZED HEALTH CARE EDUCATION/TRAINING PROGRAM
Payer: COMMERCIAL

## 2023-09-25 ENCOUNTER — APPOINTMENT (OUTPATIENT)
Dept: GENERAL RADIOLOGY | Age: 43
End: 2023-09-25
Payer: COMMERCIAL

## 2023-09-25 VITALS
BODY MASS INDEX: 49.43 KG/M2 | OXYGEN SATURATION: 99 % | SYSTOLIC BLOOD PRESSURE: 180 MMHG | RESPIRATION RATE: 18 BRPM | TEMPERATURE: 98.5 F | HEIGHT: 60 IN | DIASTOLIC BLOOD PRESSURE: 97 MMHG | WEIGHT: 251.77 LBS | HEART RATE: 83 BPM

## 2023-09-25 DIAGNOSIS — R60.0 BILATERAL LOWER EXTREMITY EDEMA: ICD-10-CM

## 2023-09-25 DIAGNOSIS — R07.89 RIGHT-SIDED CHEST WALL PAIN: Primary | ICD-10-CM

## 2023-09-25 DIAGNOSIS — R00.2 PALPITATIONS: ICD-10-CM

## 2023-09-25 LAB
ALBUMIN SERPL-MCNC: 3.4 G/DL (ref 3.4–5)
ALBUMIN/GLOB SERPL: 1 {RATIO} (ref 1.1–2.2)
ALP SERPL-CCNC: 75 U/L (ref 40–129)
ALT SERPL-CCNC: 12 U/L (ref 10–40)
ANION GAP SERPL CALCULATED.3IONS-SCNC: 11 MMOL/L (ref 3–16)
AST SERPL-CCNC: 13 U/L (ref 15–37)
BASE EXCESS BLDV CALC-SCNC: -2.2 MMOL/L
BASOPHILS # BLD: 0.1 K/UL (ref 0–0.2)
BASOPHILS NFR BLD: 1.2 %
BILIRUB SERPL-MCNC: <0.2 MG/DL (ref 0–1)
BUN SERPL-MCNC: 12 MG/DL (ref 7–20)
CALCIUM SERPL-MCNC: 8 MG/DL (ref 8.3–10.6)
CHLORIDE SERPL-SCNC: 104 MMOL/L (ref 99–110)
CO2 BLDV-SCNC: 25 MMOL/L
CO2 SERPL-SCNC: 21 MMOL/L (ref 21–32)
COHGB MFR BLDV: 2.7 %
CREAT SERPL-MCNC: 0.8 MG/DL (ref 0.6–1.1)
DEPRECATED RDW RBC AUTO: 14.5 % (ref 12.4–15.4)
EKG ATRIAL RATE: 70 BPM
EKG ATRIAL RATE: 85 BPM
EKG DIAGNOSIS: NORMAL
EKG DIAGNOSIS: NORMAL
EKG P AXIS: 39 DEGREES
EKG P-R INTERVAL: 166 MS
EKG P-R INTERVAL: 170 MS
EKG Q-T INTERVAL: 370 MS
EKG Q-T INTERVAL: 402 MS
EKG QRS DURATION: 82 MS
EKG QRS DURATION: 84 MS
EKG QTC CALCULATION (BAZETT): 434 MS
EKG QTC CALCULATION (BAZETT): 440 MS
EKG R AXIS: 15 DEGREES
EKG R AXIS: 168 DEGREES
EKG T AXIS: 146 DEGREES
EKG T AXIS: 31 DEGREES
EKG VENTRICULAR RATE: 70 BPM
EKG VENTRICULAR RATE: 85 BPM
EOSINOPHIL # BLD: 0.4 K/UL (ref 0–0.6)
EOSINOPHIL NFR BLD: 4.5 %
GFR SERPLBLD CREATININE-BSD FMLA CKD-EPI: >60 ML/MIN/{1.73_M2}
GLUCOSE SERPL-MCNC: 122 MG/DL (ref 70–99)
HCO3 BLDV-SCNC: 23 MMOL/L (ref 23–29)
HCT VFR BLD AUTO: 34.1 % (ref 36–48)
HGB BLD-MCNC: 11.5 G/DL (ref 12–16)
LYMPHOCYTES # BLD: 2.4 K/UL (ref 1–5.1)
LYMPHOCYTES NFR BLD: 30.3 %
MCH RBC QN AUTO: 27.7 PG (ref 26–34)
MCHC RBC AUTO-ENTMCNC: 33.6 G/DL (ref 31–36)
MCV RBC AUTO: 82.3 FL (ref 80–100)
METHGB MFR BLDV: 1.5 %
MONOCYTES # BLD: 0.4 K/UL (ref 0–1.3)
MONOCYTES NFR BLD: 4.8 %
NEUTROPHILS # BLD: 4.6 K/UL (ref 1.7–7.7)
NEUTROPHILS NFR BLD: 59.2 %
NT-PROBNP SERPL-MCNC: 39 PG/ML (ref 0–124)
O2 THERAPY: NORMAL
PCO2 BLDV: 41.2 MMHG (ref 40–50)
PH BLDV: 7.36 [PH] (ref 7.35–7.45)
PLATELET # BLD AUTO: 342 K/UL (ref 135–450)
PMV BLD AUTO: 6.9 FL (ref 5–10.5)
PO2 BLDV: 58 MMHG
POTASSIUM SERPL-SCNC: 3.8 MMOL/L (ref 3.5–5.1)
PROT SERPL-MCNC: 6.9 G/DL (ref 6.4–8.2)
RBC # BLD AUTO: 4.14 M/UL (ref 4–5.2)
SAO2 % BLDV: 88 %
SODIUM SERPL-SCNC: 136 MMOL/L (ref 136–145)
TROPONIN, HIGH SENSITIVITY: 8 NG/L (ref 0–14)
WBC # BLD AUTO: 7.8 K/UL (ref 4–11)

## 2023-09-25 PROCEDURE — 93010 ELECTROCARDIOGRAM REPORT: CPT | Performed by: INTERNAL MEDICINE

## 2023-09-25 PROCEDURE — 93005 ELECTROCARDIOGRAM TRACING: CPT | Performed by: STUDENT IN AN ORGANIZED HEALTH CARE EDUCATION/TRAINING PROGRAM

## 2023-09-25 PROCEDURE — 84484 ASSAY OF TROPONIN QUANT: CPT

## 2023-09-25 PROCEDURE — 82803 BLOOD GASES ANY COMBINATION: CPT

## 2023-09-25 PROCEDURE — 6360000002 HC RX W HCPCS: Performed by: STUDENT IN AN ORGANIZED HEALTH CARE EDUCATION/TRAINING PROGRAM

## 2023-09-25 PROCEDURE — 71045 X-RAY EXAM CHEST 1 VIEW: CPT

## 2023-09-25 PROCEDURE — 96374 THER/PROPH/DIAG INJ IV PUSH: CPT

## 2023-09-25 PROCEDURE — 80053 COMPREHEN METABOLIC PANEL: CPT

## 2023-09-25 PROCEDURE — 99285 EMERGENCY DEPT VISIT HI MDM: CPT

## 2023-09-25 PROCEDURE — 83880 ASSAY OF NATRIURETIC PEPTIDE: CPT

## 2023-09-25 PROCEDURE — 85025 COMPLETE CBC W/AUTO DIFF WBC: CPT

## 2023-09-25 RX ORDER — KETOROLAC TROMETHAMINE 30 MG/ML
15 INJECTION, SOLUTION INTRAMUSCULAR; INTRAVENOUS ONCE
Status: COMPLETED | OUTPATIENT
Start: 2023-09-25 | End: 2023-09-25

## 2023-09-25 RX ADMIN — KETOROLAC TROMETHAMINE 15 MG: 30 INJECTION, SOLUTION INTRAMUSCULAR; INTRAVENOUS at 05:50

## 2023-09-25 ASSESSMENT — PAIN - FUNCTIONAL ASSESSMENT
PAIN_FUNCTIONAL_ASSESSMENT: 0-10
PAIN_FUNCTIONAL_ASSESSMENT: NONE - DENIES PAIN

## 2023-09-25 ASSESSMENT — PAIN SCALES - GENERAL
PAINLEVEL_OUTOF10: 0
PAINLEVEL_OUTOF10: 7

## 2023-09-25 ASSESSMENT — PAIN DESCRIPTION - DESCRIPTORS: DESCRIPTORS: HEAVINESS;PINS AND NEEDLES

## 2023-09-25 ASSESSMENT — PAIN DESCRIPTION - LOCATION: LOCATION: CHEST;LEG

## 2023-09-25 NOTE — ED PROVIDER NOTES
making discussion with Conner Sanchez.    I estimate there is LOW risk for (including but not limited to) ACUTE CORONARY SYNDROME, PULMONARY EMBOLISM, PNEUMOTHORAX, RUPTURED ESOPHAGUS OR THORACIC AORTIC DISSECTION, thus I consider the discharge disposition reasonable. Celeste Laura (or their surrogate) and I have discussed the diagnosis and risks, and we agree with discharging home with close follow-up. We also discussed returning to the Emergency Department immediately if new or worsening symptoms occur. We have discussed the symptoms which are most concerning that necessitate immediate return. Appropriate for outpatient management        I am the Primary Clinician of Record. FINAL IMPRESSION      1. Right-sided chest wall pain    2. Bilateral lower extremity edema    3. Palpitations          DISPOSITION/PLAN     DISPOSITION    Discharged      PATIENT REFERRED TO:  Kalyani Meehan MD  [address] [facility] [address]  [address] [facility] [address] [facility]    Schedule an appointment as soon as possible for a visit       [facility] Emergency Department  [address]  [phone]    If symptoms worsen    [facility] Sleep and  [address]   [address]   Schedule an appointment as soon as possible for a visit            (Please note that portions of this note were completed with a voice recognition program.  Efforts were made to edit the dictations but occasionally words are mis-transcribed.)    Debbi Winter MD (electronically signed)          Debbi Winter MD  09/25/23 0838

## 2023-09-25 NOTE — ED NOTES
In room to draw repeat troponin, discussed with attending, states will cancel order.       Jaspreet Figueredo RN  09/25/23 [id number]

## 2023-09-25 NOTE — ED TRIAGE NOTES
Pt presents to ED via self from home with a c/o SOB, lower extremity weakness, and dizziness. Pt states her s/s has been going on since \"the beginning of the year\"; but pt states the edema in her legs has got super bad lately to the point where she can't sleep. Pt has hx of rheumatoid arthritis, HTN, high cholesterol. Pt rates pain on legs a 7/10 and describes it as \"pins and needles\" and \"heaviness\". Pt denies hx of CVA and MI. Pt states she was taking amlodipine but stopped taking it. /111 in triage. No acute distress noted.

## 2023-09-26 NOTE — PROGRESS NOTES
Adriana Cabrales (:  1980) is a 38 y.o. female,Established patient, here for evaluation of the following chief complaint(s):  Edema (Never did have her duplex scan)    Chronic health issues include include hypertension, obesity, foot pain, macromastia. She was evaluated on  for acute pain in the right knee and right foot pain. She also had a poorly controlled blood pressure, was advised to take Hyzaar      [place] the cruise and feet and leg swelling since at least January. Saw orthopedics and diagnosed with RA. Now on methotrate but not doing good on this. Went to ER on  for palpitation, b/l lower extremity edema, palpitation. Labs compression stocking present. Worried about her heart constant breathing. ASSESSMENT/PLAN:    1. Primary hypertension-not under control, patient has been off of her blood pressure medication. She reports being allergic to losartan, could not say exactly reaction type. She stopped taking her amlodipine given the leg swelling. We will start her on nifedipine and continue with hydrochlorothiazide. Recommended to get a sleep study, need to rule out sleep apnea. -     hydroCHLOROthiazide (MICROZIDE) 12.5 MG capsule; Take 1 capsule by mouth every morning, Disp-90 capsule, R-1Normal  -     NIFEdipine (PROCARDIA XL) 60 MG extended release tablet; Take 1 tablet by mouth daily, Disp-30 tablet, R-5Normal  2. Localized swelling of lower leg-in bilateral lower extremity tenderness, likely lymphedema. Additional labs ordered. Educated on low-salt diet. Stop her amlodipine and start on nifedipine. -     VL DUP LOWER EXTREMITY VENOUS BILATERAL; Future      Return in about 3 months (around 2023) for HTN. Subjective   SUBJECTIVE/OBJECTIVE:  HPI    Review of Systems       Objective   Physical Exam  Vitals reviewed. Constitutional:       Appearance: Normal appearance. HENT:      Head: Normocephalic.    Eyes:      Extraocular Movements: Extraocular

## 2023-09-27 ENCOUNTER — OFFICE VISIT (OUTPATIENT)
Dept: INTERNAL MEDICINE CLINIC | Age: 43
End: 2023-09-27
Payer: COMMERCIAL

## 2023-09-27 VITALS
SYSTOLIC BLOOD PRESSURE: 142 MMHG | OXYGEN SATURATION: 98 % | BODY MASS INDEX: 48.24 KG/M2 | WEIGHT: 247 LBS | DIASTOLIC BLOOD PRESSURE: 86 MMHG | HEART RATE: 80 BPM | TEMPERATURE: 99 F

## 2023-09-27 DIAGNOSIS — R22.40 LOCALIZED SWELLING OF LOWER LEG: ICD-10-CM

## 2023-09-27 DIAGNOSIS — I10 PRIMARY HYPERTENSION: ICD-10-CM

## 2023-09-27 DIAGNOSIS — I10 PRIMARY HYPERTENSION: Primary | ICD-10-CM

## 2023-09-27 PROCEDURE — 3074F SYST BP LT 130 MM HG: CPT | Performed by: STUDENT IN AN ORGANIZED HEALTH CARE EDUCATION/TRAINING PROGRAM

## 2023-09-27 PROCEDURE — 3078F DIAST BP <80 MM HG: CPT | Performed by: STUDENT IN AN ORGANIZED HEALTH CARE EDUCATION/TRAINING PROGRAM

## 2023-09-27 PROCEDURE — 99214 OFFICE O/P EST MOD 30 MIN: CPT | Performed by: STUDENT IN AN ORGANIZED HEALTH CARE EDUCATION/TRAINING PROGRAM

## 2023-09-27 RX ORDER — HYDROCHLOROTHIAZIDE 12.5 MG/1
12.5 CAPSULE, GELATIN COATED ORAL EVERY MORNING
Qty: 90 CAPSULE | Refills: 1 | Status: SHIPPED | OUTPATIENT
Start: 2023-09-27

## 2023-09-27 RX ORDER — NIFEDIPINE 60 MG/1
60 TABLET, EXTENDED RELEASE ORAL DAILY
Qty: 30 TABLET | Refills: 5 | Status: SHIPPED | OUTPATIENT
Start: 2023-09-27 | End: 2023-09-28

## 2023-09-28 RX ORDER — NIFEDIPINE 60 MG/1
60 TABLET, EXTENDED RELEASE ORAL DAILY
Qty: 90 TABLET | Refills: 1 | Status: SHIPPED | OUTPATIENT
Start: 2023-09-28

## 2023-09-28 NOTE — TELEPHONE ENCOUNTER
Future Appointments   Date Time Provider [address]  [address]   10/4/2023 12:40 PM MD DICK [facility]   10/20/2023 10:00 AM SCHEDULE, [facility] 2 [facility]         Last appt 9/27/23

## 2023-10-16 ENCOUNTER — OFFICE VISIT (OUTPATIENT)
Dept: SLEEP MEDICINE | Age: 43
End: 2023-10-16
Payer: COMMERCIAL

## 2023-10-16 VITALS
DIASTOLIC BLOOD PRESSURE: 82 MMHG | HEIGHT: 60 IN | WEIGHT: 245.6 LBS | TEMPERATURE: 98.1 F | OXYGEN SATURATION: 97 % | RESPIRATION RATE: 18 BRPM | SYSTOLIC BLOOD PRESSURE: 112 MMHG | HEART RATE: 88 BPM | BODY MASS INDEX: 48.22 KG/M2

## 2023-10-16 DIAGNOSIS — R06.83 SNORING: ICD-10-CM

## 2023-10-16 DIAGNOSIS — G47.33 OSA (OBSTRUCTIVE SLEEP APNEA): Primary | ICD-10-CM

## 2023-10-16 DIAGNOSIS — R06.81 WITNESSED EPISODE OF APNEA: ICD-10-CM

## 2023-10-16 DIAGNOSIS — G47.19 EXCESSIVE DAYTIME SLEEPINESS: ICD-10-CM

## 2023-10-16 DIAGNOSIS — I10 HYPERTENSION, UNSPECIFIED TYPE: ICD-10-CM

## 2023-10-16 PROCEDURE — 3074F SYST BP LT 130 MM HG: CPT | Performed by: PSYCHIATRY & NEUROLOGY

## 2023-10-16 PROCEDURE — 3079F DIAST BP 80-89 MM HG: CPT | Performed by: PSYCHIATRY & NEUROLOGY

## 2023-10-16 PROCEDURE — 99204 OFFICE O/P NEW MOD 45 MIN: CPT | Performed by: PSYCHIATRY & NEUROLOGY

## 2023-10-16 ASSESSMENT — ENCOUNTER SYMPTOMS
GASTROINTESTINAL NEGATIVE: 1
SHORTNESS OF BREATH: 1
ALLERGIC/IMMUNOLOGIC NEGATIVE: 1
EYES NEGATIVE: 1
APNEA: 1
WHEEZING: 1

## 2023-10-16 ASSESSMENT — SLEEP AND FATIGUE QUESTIONNAIRES
HOW LIKELY ARE YOU TO NOD OFF OR FALL ASLEEP WHEN YOU ARE A PASSENGER IN A CAR FOR AN HOUR WITHOUT A BREAK: 0
HOW LIKELY ARE YOU TO NOD OFF OR FALL ASLEEP WHILE SITTING AND TALKING TO SOMEONE: 0
HOW LIKELY ARE YOU TO NOD OFF OR FALL ASLEEP WHILE LYING DOWN TO REST IN THE AFTERNOON WHEN CIRCUMSTANCES PERMIT: 3
HOW LIKELY ARE YOU TO NOD OFF OR FALL ASLEEP WHILE SITTING INACTIVE IN A PUBLIC PLACE: 0
HOW LIKELY ARE YOU TO NOD OFF OR FALL ASLEEP IN A CAR, WHILE STOPPED FOR A FEW MINUTES IN TRAFFIC: 0
HOW LIKELY ARE YOU TO NOD OFF OR FALL ASLEEP WHILE WATCHING TV: 0
NECK CIRCUMFERENCE (INCHES): 16
HOW LIKELY ARE YOU TO NOD OFF OR FALL ASLEEP WHILE SITTING QUIETLY AFTER LUNCH WITHOUT ALCOHOL: 0
HOW LIKELY ARE YOU TO NOD OFF OR FALL ASLEEP WHILE SITTING AND READING: 0
ESS TOTAL SCORE: 3

## 2023-10-16 NOTE — PATIENT INSTRUCTIONS
Orders Placed This Encounter   Procedures    Home Sleep Study     Standing Status:   Future     Standing Expiration Date:   10/16/2024     Order Specific Question:   Location For Sleep Study     Answer:   [place]     Order Specific Question:   Select Sleep Lab Location     Answer:   [facility]

## 2023-10-20 ENCOUNTER — HOSPITAL ENCOUNTER (OUTPATIENT)
Dept: VASCULAR LAB | Age: 43
Discharge: HOME | End: 2023-10-20
Payer: COMMERCIAL

## 2023-10-20 ENCOUNTER — HOSPITAL ENCOUNTER (OUTPATIENT)
Dept: SLEEP CENTER | Age: 43
Discharge: HOME | End: 2023-10-20
Attending: PSYCHIATRY & NEUROLOGY
Payer: COMMERCIAL

## 2023-10-20 DIAGNOSIS — G47.33 OSA (OBSTRUCTIVE SLEEP APNEA): ICD-10-CM

## 2023-10-20 DIAGNOSIS — R22.40 LOCALIZED SWELLING OF LOWER LEG: ICD-10-CM

## 2023-10-20 PROCEDURE — 93970 EXTREMITY STUDY: CPT

## 2023-10-23 ENCOUNTER — TELEPHONE (OUTPATIENT)
Dept: INTERNAL MEDICINE CLINIC | Age: 43
End: 2023-10-23

## 2023-10-23 PROBLEM — G47.33 OSA (OBSTRUCTIVE SLEEP APNEA): Status: ACTIVE | Noted: 2023-10-23

## 2023-10-23 NOTE — TELEPHONE ENCOUNTER
Pt called, she has sleep apnea, due to her weight.  Would like ko know if a referral can be sent for her to the [address] [place] [address] Weight loss program    Please advise    Thank you

## 2023-10-24 ENCOUNTER — TELEPHONE (OUTPATIENT)
Dept: PULMONOLOGY | Age: 43
End: 2023-10-24

## 2023-10-24 NOTE — TELEPHONE ENCOUNTER
The patient has been notified of this information and all questions answered.   Faxed to [facility]

## 2023-10-24 NOTE — TELEPHONE ENCOUNTER
Called the pt and left voicemail relaying the message to her as well as posted on [employer]. Told her to call back if she had any additional questions.

## 2023-10-24 NOTE — TELEPHONE ENCOUNTER
HOME Sleep study showed mild ELIANA (on a scale of mild, moderate and severe). AHI was 10.7  per hr. (Average times per hr breathing was obstructed). O2 Desaturations to 82% (lowest o2)   Dr Marilee Rutledge: Follow up with the patient's sleep physician to discuss results  APAP between 5 and 15 cm   Avoid sedatives, alcohol and caffeinated drinks at bedtime. Avoid driving while sleepy.        LMOM to call     NEED DME

## 2023-11-06 ENCOUNTER — TELEPHONE (OUTPATIENT)
Dept: BARIATRICS/WEIGHT MGMT | Age: 43
End: 2023-11-06

## 2023-11-06 NOTE — TELEPHONE ENCOUNTER
Pt called stating that she has been receiving calls to schedule an appt but she is already scheduled with Dr Mendoza on 2/7/24.    If you need to reach the pt, she can be reached at [phone].

## 2023-12-11 ENCOUNTER — TELEPHONE (OUTPATIENT)
Dept: PULMONOLOGY | Age: 43
End: 2023-12-11

## 2023-12-11 NOTE — TELEPHONE ENCOUNTER
Pt called because she states her CPAP is making her sleeping worse. She gets very fatigued and has a lot of congestion when using the CPAP. Please follow up.

## 2023-12-27 DIAGNOSIS — M25.561 ACUTE PAIN OF RIGHT KNEE: ICD-10-CM

## 2023-12-27 RX ORDER — IBUPROFEN 800 MG/1
TABLET ORAL
Qty: 30 TABLET | Refills: 0 | Status: SHIPPED | OUTPATIENT
Start: 2023-12-27

## 2024-01-02 ENCOUNTER — OFFICE VISIT (OUTPATIENT)
Dept: SLEEP MEDICINE | Age: 44
End: 2024-01-02
Payer: COMMERCIAL

## 2024-01-02 VITALS
BODY MASS INDEX: 47.71 KG/M2 | DIASTOLIC BLOOD PRESSURE: 82 MMHG | WEIGHT: 243 LBS | SYSTOLIC BLOOD PRESSURE: 110 MMHG | HEIGHT: 60 IN | HEART RATE: 75 BPM | RESPIRATION RATE: 18 BRPM | OXYGEN SATURATION: 99 % | TEMPERATURE: 97.6 F

## 2024-01-02 DIAGNOSIS — G47.33 OSA ON CPAP: Primary | ICD-10-CM

## 2024-01-02 DIAGNOSIS — I10 HYPERTENSION, UNSPECIFIED TYPE: ICD-10-CM

## 2024-01-02 DIAGNOSIS — Z99.89 DEPENDENCE ON OTHER ENABLING MACHINES AND DEVICES: ICD-10-CM

## 2024-01-02 PROCEDURE — 3074F SYST BP LT 130 MM HG: CPT | Performed by: PSYCHIATRY & NEUROLOGY

## 2024-01-02 PROCEDURE — 99214 OFFICE O/P EST MOD 30 MIN: CPT | Performed by: PSYCHIATRY & NEUROLOGY

## 2024-01-02 PROCEDURE — 3079F DIAST BP 80-89 MM HG: CPT | Performed by: PSYCHIATRY & NEUROLOGY

## 2024-01-02 ASSESSMENT — SLEEP AND FATIGUE QUESTIONNAIRES
HOW LIKELY ARE YOU TO NOD OFF OR FALL ASLEEP WHILE SITTING AND TALKING TO SOMEONE: 0
HOW LIKELY ARE YOU TO NOD OFF OR FALL ASLEEP WHILE SITTING AND READING: 1
HOW LIKELY ARE YOU TO NOD OFF OR FALL ASLEEP WHEN YOU ARE A PASSENGER IN A CAR FOR AN HOUR WITHOUT A BREAK: 0
HOW LIKELY ARE YOU TO NOD OFF OR FALL ASLEEP WHILE WATCHING TV: 1
HOW LIKELY ARE YOU TO NOD OFF OR FALL ASLEEP WHILE LYING DOWN TO REST IN THE AFTERNOON WHEN CIRCUMSTANCES PERMIT: 1
ESS TOTAL SCORE: 5
HOW LIKELY ARE YOU TO NOD OFF OR FALL ASLEEP IN A CAR, WHILE STOPPED FOR A FEW MINUTES IN TRAFFIC: 0
HOW LIKELY ARE YOU TO NOD OFF OR FALL ASLEEP WHILE SITTING INACTIVE IN A PUBLIC PLACE: 1
HOW LIKELY ARE YOU TO NOD OFF OR FALL ASLEEP WHILE SITTING QUIETLY AFTER LUNCH WITHOUT ALCOHOL: 1

## 2024-01-02 NOTE — PROGRESS NOTES
Inna Steinberg         : 1980  [x] [facility]     [] [facility]      [] [facility]     [facility]    [] [facility]  [] [facility]  [] [facility]   [] [facility] [] Other:  Diagnosis: [x] ELIANA (G47.33) [] CSA (G47.31) [] Apnea (G47.30)   Length of Need: [] 12 Months [x] 99 Months [] Other:    Machine (RYNE!):  [x] ResMed AirSense     Auto [] Other:     [x]  CPAP () [] Bilevel ()   Mode: [x] Auto [] Spontaneous    Mode: [] Auto [] Spontaneous           D/C the CPAP                     Start Order Date: 24    MEDICAL JUSTIFICATION:  I, the undersigned, certify that the above prescribed supplies are medically necessary for this patient’s wellbeing.  In my opinion, the supplies are both reasonable and necessary in reference to accepted standards of medicalpractice in treatment of this patient’s condition.    Cristobal Briones MD      NPI: [id number]       Order Signed Date: 24    Electronically signed by Cristobal Briones MD on 2024 at 11:59 AM

## 2024-01-02 NOTE — PROGRESS NOTES
and documented physiologic data from patient's PAP machine.    No orders of the defined types were placed in this encounter.      Return if symptoms worsen or fail to improve.    Cristobal Briones MD  Medical Director - [facility]

## 2024-01-09 ENCOUNTER — TELEPHONE (OUTPATIENT)
Dept: BARIATRICS/WEIGHT MGMT | Age: 44
End: 2024-01-09

## 2024-01-09 NOTE — TELEPHONE ENCOUNTER
Spoke to pt regarding seminar,    Pt does have 2 insurances    PT DOES HAVE coverage with primary, [employer], 0 diet    PT DOES NOT HAVE coverage with secondary UMR, only coverage at the [facility], not [facility],  **THIS PLAN DOES NOT HAVE ANY COVERAGE AS A SECONDARY PAYER**    Pt aware that her 2nd insurance will not  any that her first doesn't cover.

## 2024-01-31 ENCOUNTER — TELEPHONE (OUTPATIENT)
Dept: BARIATRICS/WEIGHT MGMT | Age: 44
End: 2024-01-31

## 2024-01-31 NOTE — TELEPHONE ENCOUNTER
Called as a new pt courtesy call - left message.  Told patient to have new pt paperwork completely filled out, insurance card, and id and to [place] at [facility].  If they didn't have the paperwork filled out and arrive on time may be rescheduled.  Also stated if they didn't receive paperwork to let us know so we could get it to them another way.  Left office number on message.   Note- patient is also scheduled for a [facility] new pt appt 2/7 - told her that isn't typical and to let us know if she plans to attend either.

## 2024-02-01 ENCOUNTER — OFFICE VISIT (OUTPATIENT)
Dept: BARIATRICS/WEIGHT MGMT | Age: 44
End: 2024-02-01
Payer: COMMERCIAL

## 2024-02-01 VITALS
HEIGHT: 62 IN | BODY MASS INDEX: 44.9 KG/M2 | DIASTOLIC BLOOD PRESSURE: 131 MMHG | HEART RATE: 85 BPM | OXYGEN SATURATION: 96 % | RESPIRATION RATE: 14 BRPM | SYSTOLIC BLOOD PRESSURE: 207 MMHG | WEIGHT: 244 LBS

## 2024-02-01 DIAGNOSIS — E88.810 METABOLIC SYNDROME: Primary | ICD-10-CM

## 2024-02-01 DIAGNOSIS — I10 ESSENTIAL HYPERTENSION: ICD-10-CM

## 2024-02-01 DIAGNOSIS — G47.33 OBSTRUCTIVE SLEEP APNEA: ICD-10-CM

## 2024-02-01 DIAGNOSIS — E66.01 MORBID OBESITY WITH BMI OF 45.0-49.9, ADULT (HCC): ICD-10-CM

## 2024-02-01 DIAGNOSIS — E88.810 METABOLIC SYNDROME: ICD-10-CM

## 2024-02-01 DIAGNOSIS — E78.2 MIXED HYPERLIPIDEMIA: ICD-10-CM

## 2024-02-01 LAB
BASOPHILS # BLD: 0 K/UL (ref 0–0.2)
BASOPHILS NFR BLD: 0.5 %
DEPRECATED RDW RBC AUTO: 15.4 % (ref 12.4–15.4)
EOSINOPHIL # BLD: 0.2 K/UL (ref 0–0.6)
EOSINOPHIL NFR BLD: 3.3 %
HCT VFR BLD AUTO: 37.6 % (ref 36–48)
HGB BLD-MCNC: 12.5 G/DL (ref 12–16)
LYMPHOCYTES # BLD: 2.3 K/UL (ref 1–5.1)
LYMPHOCYTES NFR BLD: 34.9 %
MCH RBC QN AUTO: 26.9 PG (ref 26–34)
MCHC RBC AUTO-ENTMCNC: 33.2 G/DL (ref 31–36)
MCV RBC AUTO: 81 FL (ref 80–100)
MONOCYTES # BLD: 0.3 K/UL (ref 0–1.3)
MONOCYTES NFR BLD: 5.3 %
NEUTROPHILS # BLD: 3.6 K/UL (ref 1.7–7.7)
NEUTROPHILS NFR BLD: 56 %
PLATELET # BLD AUTO: 413 K/UL (ref 135–450)
PMV BLD AUTO: 6.8 FL (ref 5–10.5)
RBC # BLD AUTO: 4.64 M/UL (ref 4–5.2)
WBC # BLD AUTO: 6.5 K/UL (ref 4–11)

## 2024-02-01 PROCEDURE — 99205 OFFICE O/P NEW HI 60 MIN: CPT | Performed by: SURGERY

## 2024-02-01 PROCEDURE — 3077F SYST BP >= 140 MM HG: CPT | Performed by: SURGERY

## 2024-02-01 PROCEDURE — 3080F DIAST BP >= 90 MM HG: CPT | Performed by: SURGERY

## 2024-02-01 NOTE — PROGRESS NOTES
[facility]   General & Laparoscopic Surgery  [employer] [facility] [employer]      HPI:    Inna Steinberg is a very pleasant 43 y.o. obese female ,   Body mass index is 45.36 kg/m².  And multiple medical problems who is presenting for weight loss surgery evaluation and consultation     Patient has been struggling for several years now with obesity. Patient feels the weight is an obstacle to achieve and perform things in daily living as well risk on health.     Tries to diet, and exercise but can't keep the weight off.  Patient tried other regimens, but with no sustainable weight loss.     Patient  is very determined to lose weight and be healthy, and is interested in surgical weight loss to achieve this goal.    Otherwise patient denies any nausea, vomiting, fevers, chills, shortness of breath, chest pain, constipation or urinary symptoms.      Morbid obesity and co-morbidities related to it are a threat to bodily function    Not currently compliant with BP Meds      Past Medical History:   Diagnosis Date    Abnormal Pap smear of cervix     Arthritis     Essential hypertension     Hyperlipidemia 12/19/2018    Hypertension     Iron deficiency 08/26/2019    Metabolic syndrome     Mixed hyperlipidemia     Morbid obesity with BMI of 40.0-44.9, adult (HCC) 05/28/2018    Obstructive sleep apnea     Pre-operative clearance     Rheumatoid arthritis ([facility])     Sleep apnea      Past Surgical History:   Procedure Laterality Date    HYSTERECTOMY ABDOMINAL Bilateral 08/29/2019    DAVINCI ROBOTIC TOTAL LAPAROSCOPIC HYSTERECTOMY WITH BILATERAL SALPINGECTOMY performed by Catalina Underwood DO at [facility] OR    MD COLPOSCOPY ENTIRE VAGINA W/CERVIX IF PRESENT N/A 07/27/2018    CERVICAL COLD KNIFE CONIZATION, COLPOSCOPY UNDER ANESTHESIA performed by Catalina Underwood DO at [facility] OR    SALPINGECTOMY      TUBAL LIGATION  2001     Family History   Problem Relation Age of Onset    Diabetes Mother     High Blood Pressure

## 2024-02-01 NOTE — PROGRESS NOTES
Inna Steinberg is a 43 y.o. female with a date of birth of 1980.    Vitals:    02/01/24 0931   BP: (!) 207/131   Pulse: 85   Resp: 14   SpO2: 96%    BMI: Body mass index is 45.36 kg/m². Obesity Classification: Class III    Weight History:   Wt Readings from Last 3 Encounters:   02/01/24 110.7 kg (244 lb)   01/02/24 110.2 kg (243 lb)   10/16/23 111.4 kg (245 lb 9.6 oz)       Patient's lowest adult weight was 180 lbs at age 33.     Patient's highest adult weight was 245 lbs at age 43.     Patient has participated in the following weight loss programs: cabbage soup.   Patient has not participated in meal replacement/liquid diets.  Patient has not participated in weight loss medications.    Patient is not lactose intolerant.  Patient does not have Sikhism/cultural food concerns. Patient does not have food allergies. Patient does tolerate artificial sweeteners.     24 hour recall/food frequency chart: Eats 1-3 x day. 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 Pt only eats chicken/turkey/eggs/sometimes cheese/seldom milk, does not eat beef/pork, limited fish.   Breakfast: skips OR turkey sausage with eggs 3 x week   Snack: skips  Lunch: turkey sandwich sometimes chips   Snack: skips  Dinner: turkey chops with mac and cheese and veggies   Snack: whoppers   Drinks throughout the day: Drinks water, 1 regular soda, juice, sweet tea   Do you drink alcohol? Yes. How often/how much alcohol do you drink: 1 Glasses of wine per month/ 2 shots per month.     Patient does not meet the criteria for binge eating disorder. Patient does not have grazing. Patient does not have night eating. Patient does not have a history of emotional eating or eating out of boredom.    Exercise is limited with rheumatoid arthritis and bad knees.     Surgery  Patient does feel confident in her ability to make these changes.  The patient's expectations of post-surgical eating habits realistic.    Patient states she does

## 2024-02-01 NOTE — PROGRESS NOTES
Inna Steinberg is a 43 y.o. female with a date of birth of 1980.    There were no vitals filed for this visit. BMI: There is no height or weight on file to calculate BMI. Obesity Classification: {CLASS I - III:231597578}    Weight History:   Wt Readings from Last 3 Encounters:   01/02/24 110.2 kg (243 lb)   10/16/23 111.4 kg (245 lb 9.6 oz)   09/27/23 112 kg (247 lb)     Patient's lowest adult weight was *** lbs at age ***.     Patient's highest adult weight was *** lbs at age ***.     Patient has participated in the following weight loss programs: {Diet Plan:20119} and ***.   Patient {HAS HAS NOT:18834} participated in meal replacement/liquid diets.  Patient {HAS HAS NOT:31551} participated in weight loss medications.    Patient {IS/IS NOT:19932} lactose intolerant.  Patient {DOES/DOES NOT:36056} have Scientology/cultural food concerns. Patient {DOES/DOES NOT:70229} have food allergies. Patient {DOES/DOES NOT:16845} tolerate artificial sweeteners.     24 hour recall/food frequency chart:  Breakfast: {yes no:877973}. ***  Snack: {yes no:488729}. ***  Lunch: {yes no:066575}. ***  Snack: {yes no:295535}. ***  Dinner: {yes no:043250}. ***  Snack: {yes no:946946}. ***  Drinks throughout the day: ***  Do you drink alcohol? {YES / NO:80148}. How often/how much alcohol do you drink: {ALCOHOL TYPE:28273} per {day/wk/mo urology:190739}.    Patient {DOES/DOES NOT:20953} meet the criteria for binge eating disorder. Patient {DOES/DOES NOT:69546} have grazing. Patient {DOES/DOES NOT:75631} have night eating. Patient {DOES/DOES NOT:80221} have a history of emotional eating or eating out of boredom.    Surgery  Patient {DOES/DOES NOT:70790} feel confident in her ability to make these changes.  The patient's expectations of post-surgical eating habits ***.    Patient states she {DOES/DOES NOT:25078} understand the consequences of not complying with post-op food guidelines.  Patient states she {DOES/DOES NOT:56002}

## 2024-02-02 LAB
25(OH)D3 SERPL-MCNC: 10.6 NG/ML
ALBUMIN SERPL-MCNC: 4.3 G/DL (ref 3.4–5)
ALBUMIN/GLOB SERPL: 1.3 {RATIO} (ref 1.1–2.2)
ALP SERPL-CCNC: 72 U/L (ref 40–129)
ALT SERPL-CCNC: 15 U/L (ref 10–40)
ANION GAP SERPL CALCULATED.3IONS-SCNC: 12 MMOL/L (ref 3–16)
AST SERPL-CCNC: 14 U/L (ref 15–37)
BILIRUB SERPL-MCNC: <0.2 MG/DL (ref 0–1)
BUN SERPL-MCNC: 12 MG/DL (ref 7–20)
CALCIUM SERPL-MCNC: 8.6 MG/DL (ref 8.3–10.6)
CHLORIDE SERPL-SCNC: 102 MMOL/L (ref 99–110)
CHOLEST SERPL-MCNC: 207 MG/DL (ref 0–199)
CO2 SERPL-SCNC: 26 MMOL/L (ref 21–32)
CREAT SERPL-MCNC: 0.7 MG/DL (ref 0.6–1.1)
EST. AVERAGE GLUCOSE BLD GHB EST-MCNC: 111.2 MG/DL
GFR SERPLBLD CREATININE-BSD FMLA CKD-EPI: >60 ML/MIN/{1.73_M2}
GLUCOSE SERPL-MCNC: 99 MG/DL (ref 70–99)
HBA1C MFR BLD: 5.5 %
HDLC SERPL-MCNC: 38 MG/DL (ref 40–60)
IRON SATN MFR SERPL: 22 % (ref 15–50)
IRON SERPL-MCNC: 60 UG/DL (ref 37–145)
LDLC SERPL CALC-MCNC: 139 MG/DL
POTASSIUM SERPL-SCNC: 4 MMOL/L (ref 3.5–5.1)
PROT SERPL-MCNC: 7.6 G/DL (ref 6.4–8.2)
SODIUM SERPL-SCNC: 140 MMOL/L (ref 136–145)
TIBC SERPL-MCNC: 272 UG/DL (ref 260–445)
TRIGL SERPL-MCNC: 149 MG/DL (ref 0–150)
TSH SERPL DL<=0.005 MIU/L-ACNC: 2.41 UIU/ML (ref 0.27–4.2)
VIT B12 SERPL-MCNC: 393 PG/ML (ref 211–911)
VLDLC SERPL CALC-MCNC: 30 MG/DL

## 2024-03-20 ENCOUNTER — TELEPHONE (OUTPATIENT)
Dept: INTERNAL MEDICINE CLINIC | Age: 44
End: 2024-03-20

## 2024-03-20 ENCOUNTER — OFFICE VISIT (OUTPATIENT)
Dept: INTERNAL MEDICINE CLINIC | Age: 44
End: 2024-03-20
Payer: COMMERCIAL

## 2024-03-20 ENCOUNTER — HOSPITAL ENCOUNTER (EMERGENCY)
Age: 44
Discharge: HOME | End: 2024-03-20
Attending: EMERGENCY MEDICINE
Payer: COMMERCIAL

## 2024-03-20 ENCOUNTER — APPOINTMENT (OUTPATIENT)
Dept: GENERAL RADIOLOGY | Age: 44
End: 2024-03-20
Payer: COMMERCIAL

## 2024-03-20 VITALS
WEIGHT: 247.8 LBS | OXYGEN SATURATION: 97 % | TEMPERATURE: 97.8 F | HEART RATE: 86 BPM | DIASTOLIC BLOOD PRESSURE: 126 MMHG | BODY MASS INDEX: 46.06 KG/M2 | RESPIRATION RATE: 18 BRPM | SYSTOLIC BLOOD PRESSURE: 193 MMHG

## 2024-03-20 VITALS
WEIGHT: 246 LBS | BODY MASS INDEX: 45.73 KG/M2 | TEMPERATURE: 98.4 F | HEART RATE: 117 BPM | SYSTOLIC BLOOD PRESSURE: 132 MMHG | DIASTOLIC BLOOD PRESSURE: 80 MMHG | OXYGEN SATURATION: 97 % | RESPIRATION RATE: 18 BRPM

## 2024-03-20 DIAGNOSIS — R06.02 SHORTNESS OF BREATH: Primary | ICD-10-CM

## 2024-03-20 DIAGNOSIS — J45.41 MODERATE PERSISTENT ASTHMA WITH ACUTE EXACERBATION: Primary | ICD-10-CM

## 2024-03-20 LAB
EKG ATRIAL RATE: 95 BPM
EKG DIAGNOSIS: NORMAL
EKG P AXIS: 39 DEGREES
EKG P-R INTERVAL: 174 MS
EKG Q-T INTERVAL: 358 MS
EKG QRS DURATION: 86 MS
EKG QTC CALCULATION (BAZETT): 449 MS
EKG R AXIS: 12 DEGREES
EKG T AXIS: 37 DEGREES
EKG VENTRICULAR RATE: 95 BPM

## 2024-03-20 PROCEDURE — 99284 EMERGENCY DEPT VISIT MOD MDM: CPT

## 2024-03-20 PROCEDURE — 6360000002 HC RX W HCPCS: Performed by: EMERGENCY MEDICINE

## 2024-03-20 PROCEDURE — 71045 X-RAY EXAM CHEST 1 VIEW: CPT

## 2024-03-20 PROCEDURE — 99213 OFFICE O/P EST LOW 20 MIN: CPT | Performed by: STUDENT IN AN ORGANIZED HEALTH CARE EDUCATION/TRAINING PROGRAM

## 2024-03-20 PROCEDURE — 3079F DIAST BP 80-89 MM HG: CPT | Performed by: STUDENT IN AN ORGANIZED HEALTH CARE EDUCATION/TRAINING PROGRAM

## 2024-03-20 PROCEDURE — 94640 AIRWAY INHALATION TREATMENT: CPT

## 2024-03-20 PROCEDURE — 6370000000 HC RX 637 (ALT 250 FOR IP): Performed by: EMERGENCY MEDICINE

## 2024-03-20 PROCEDURE — 3075F SYST BP GE 130 - 139MM HG: CPT | Performed by: STUDENT IN AN ORGANIZED HEALTH CARE EDUCATION/TRAINING PROGRAM

## 2024-03-20 PROCEDURE — 94760 N-INVAS EAR/PLS OXIMETRY 1: CPT

## 2024-03-20 RX ORDER — PREDNISONE 20 MG/1
40 TABLET ORAL DAILY
Qty: 10 TABLET | Refills: 0 | Status: SHIPPED | OUTPATIENT
Start: 2024-03-20 | End: 2024-03-25

## 2024-03-20 RX ORDER — ALBUTEROL SULFATE 0.63 MG/3ML
1 SOLUTION RESPIRATORY (INHALATION) EVERY 6 HOURS PRN
Qty: 270 ML | Refills: 5 | Status: SHIPPED | OUTPATIENT
Start: 2024-03-20

## 2024-03-20 RX ORDER — NIFEDIPINE 30 MG/1
90 TABLET, EXTENDED RELEASE ORAL ONCE
Status: COMPLETED | OUTPATIENT
Start: 2024-03-20 | End: 2024-03-20

## 2024-03-20 RX ORDER — IPRATROPIUM BROMIDE AND ALBUTEROL SULFATE 2.5; .5 MG/3ML; MG/3ML
1 SOLUTION RESPIRATORY (INHALATION) EVERY 4 HOURS
Qty: 360 ML | Refills: 2 | Status: SHIPPED | OUTPATIENT
Start: 2024-03-20

## 2024-03-20 RX ORDER — IPRATROPIUM BROMIDE AND ALBUTEROL SULFATE 2.5; .5 MG/3ML; MG/3ML
1 SOLUTION RESPIRATORY (INHALATION) ONCE
Status: COMPLETED | OUTPATIENT
Start: 2024-03-20 | End: 2024-03-20

## 2024-03-20 RX ORDER — HYDROCHLOROTHIAZIDE 25 MG/1
25 TABLET ORAL ONCE
Status: COMPLETED | OUTPATIENT
Start: 2024-03-20 | End: 2024-03-20

## 2024-03-20 RX ORDER — IPRATROPIUM BROMIDE AND ALBUTEROL SULFATE 2.5; .5 MG/3ML; MG/3ML
1 SOLUTION RESPIRATORY (INHALATION) EVERY 4 HOURS
Qty: 360 ML | Refills: 2 | Status: SHIPPED | OUTPATIENT
Start: 2024-03-20 | End: 2024-03-20

## 2024-03-20 RX ORDER — PREDNISONE 20 MG/1
60 TABLET ORAL ONCE
Status: COMPLETED | OUTPATIENT
Start: 2024-03-20 | End: 2024-03-20

## 2024-03-20 RX ORDER — DOXYCYCLINE HYCLATE 100 MG
100 TABLET ORAL 2 TIMES DAILY
Qty: 14 TABLET | Refills: 0 | Status: SHIPPED | OUTPATIENT
Start: 2024-03-20 | End: 2024-03-27

## 2024-03-20 RX ORDER — ALBUTEROL SULFATE 0.63 MG/3ML
1 SOLUTION RESPIRATORY (INHALATION) EVERY 6 HOURS PRN
COMMUNITY
End: 2024-03-20 | Stop reason: SDUPTHER

## 2024-03-20 RX ORDER — NEBULIZER ACCESSORIES
1 KIT MISCELLANEOUS DAILY
Qty: 1 KIT | Refills: 0 | Status: SHIPPED | OUTPATIENT
Start: 2024-03-20

## 2024-03-20 RX ORDER — NEBULIZER ACCESSORIES
1 KIT MISCELLANEOUS DAILY PRN
Qty: 1 KIT | Refills: 0 | Status: SHIPPED | OUTPATIENT
Start: 2024-03-20 | End: 2024-03-20

## 2024-03-20 RX ORDER — BENZONATATE 100 MG/1
100 CAPSULE ORAL 3 TIMES DAILY PRN
Qty: 30 CAPSULE | Refills: 0 | Status: SHIPPED | OUTPATIENT
Start: 2024-03-20 | End: 2024-03-27

## 2024-03-20 RX ORDER — ALBUTEROL SULFATE 2.5 MG/3ML
5 SOLUTION RESPIRATORY (INHALATION) ONCE
Status: COMPLETED | OUTPATIENT
Start: 2024-03-20 | End: 2024-03-20

## 2024-03-20 RX ORDER — NEBULIZER ACCESSORIES
1 KIT MISCELLANEOUS DAILY PRN
Qty: 1 KIT | Refills: 0 | Status: SHIPPED | OUTPATIENT
Start: 2024-03-20

## 2024-03-20 RX ADMIN — HYDROCHLOROTHIAZIDE 25 MG: 25 TABLET ORAL at 02:58

## 2024-03-20 RX ADMIN — NIFEDIPINE 90 MG: 30 TABLET, EXTENDED RELEASE ORAL at 02:58

## 2024-03-20 RX ADMIN — ALBUTEROL SULFATE 5 MG: 2.5 SOLUTION RESPIRATORY (INHALATION) at 02:22

## 2024-03-20 RX ADMIN — IPRATROPIUM BROMIDE AND ALBUTEROL SULFATE 1 DOSE: 2.5; .5 SOLUTION RESPIRATORY (INHALATION) at 02:22

## 2024-03-20 RX ADMIN — PREDNISONE 60 MG: 20 TABLET ORAL at 02:06

## 2024-03-20 ASSESSMENT — PAIN SCALES - GENERAL: PAINLEVEL_OUTOF10: 0

## 2024-03-20 ASSESSMENT — PAIN - FUNCTIONAL ASSESSMENT: PAIN_FUNCTIONAL_ASSESSMENT: 0-10

## 2024-03-20 NOTE — TELEPHONE ENCOUNTER
Nebulizer needs to go to [facility]  fax [phone]  on the RX it needs medication and dosage. Please.

## 2024-03-20 NOTE — ED PROVIDER NOTES
I PERSONALLY SAW THE PATIENT AND PERFORMED A SUBSTANTIVE PORTION OF THE VISIT INCLUDING ALL ASPECTS OF THE MEDICAL DECISION MAKING PROCESS.    [facility] EMERGENCY DEPARTMENT  EMERGENCY DEPARTMENT ENCOUNTER      Pt Name: Inna Steinberg  MRN: [id number]  Birthdate 1980  Date of evaluation: 3/20/2024  Provider: Korey Cisneros MD    CHIEF COMPLAINT       Chief Complaint   Patient presents with    Shortness of Breath     Pt present to the ED via self c/o asthma flare up. Pt states that she took her albuterol but its not helping.       HISTORY OF PRESENT ILLNESS    Inna Steinberg is a 43 y.o. female who presents to the emergency department with shortness of breath.  Patient presents with shortness of breath from home.  Asthma flare.  Requesting breathing treatment.  No chest pain.  No headache.  No back pain.  States this just asthma flare.  Refusing blood.  No other associated symptoms.    Nursing Notes were reviewed. Including nursing noted for FM, Surgical History, Past Medical History, Social History, vitals, and allergies; agree with all.     REVIEW OF SYSTEMS       Review of Systems    Except as noted above the remainder of the review of systems was reviewed and negative.     PAST MEDICAL HISTORY     Past Medical History:   Diagnosis Date    Abnormal Pap smear of cervix     Arthritis     Essential hypertension     Hyperlipidemia 12/19/2018    Hypertension     Iron deficiency 08/26/2019    Metabolic syndrome     Mixed hyperlipidemia     Morbid obesity with BMI of 40.0-44.9, adult (HCC) 05/28/2018    Obstructive sleep apnea     Pre-operative clearance     Rheumatoid arthritis (HCC)     Sleep apnea        SURGICAL HISTORY       Past Surgical History:   Procedure Laterality Date    HYSTERECTOMY ABDOMINAL Bilateral 08/29/2019    DAVINCI ROBOTIC TOTAL LAPAROSCOPIC HYSTERECTOMY WITH BILATERAL SALPINGECTOMY performed by Catalina Underwood DO at [facility] OR    IN COLPOSCOPY ENTIRE VAGINA W/CERVIX

## 2024-03-28 PROBLEM — J45.41 MODERATE PERSISTENT ASTHMA WITH ACUTE EXACERBATION: Status: ACTIVE | Noted: 2024-03-28

## 2024-03-28 NOTE — PROGRESS NOTES
[facility] Internal Medicine  Clinic Progress note:    Inna Steinberg is a 43 y.o. female with the past medical history as listed below presenting to the clinic for follow up on chronic condition and discussion of preventative health care.    Chief Complaint   Patient presents with    Asthma    Cough           Past Medical History:   Diagnosis Date    Abnormal Pap smear of cervix     Arthritis     Essential hypertension     Hyperlipidemia 12/19/2018    Hypertension     Iron deficiency 08/26/2019    Metabolic syndrome     Mixed hyperlipidemia     Morbid obesity with BMI of 40.0-44.9, adult (HCC) 05/28/2018    Obstructive sleep apnea     Pre-operative clearance     Rheumatoid arthritis ([facility])     Sleep apnea        Past Surgical History:   Procedure Laterality Date    HYSTERECTOMY ABDOMINAL Bilateral 08/29/2019    DAVINCI ROBOTIC TOTAL LAPAROSCOPIC HYSTERECTOMY WITH BILATERAL SALPINGECTOMY performed by Catalina Underwood DO at [facility] OR    IA COLPOSCOPY ENTIRE VAGINA W/CERVIX IF PRESENT N/A 07/27/2018    CERVICAL COLD KNIFE CONIZATION, COLPOSCOPY UNDER ANESTHESIA performed by Catalina Underwood DO at [facility] OR    SALPINGECTOMY      TUBAL LIGATION  2001       Social History     Socioeconomic History    Marital status: Single     Spouse name: Not on file    Number of children: Not on file    Years of education: Not on file    Highest education level: Not on file   Occupational History    Not on file   Tobacco Use    Smoking status: Never     Passive exposure: Past    Smokeless tobacco: Never   Vaping Use    Vaping Use: Never used   Substance and Sexual Activity    Alcohol use: Yes     Comment: 1-2 drinks per month    Drug use: No    Sexual activity: Yes     Partners: Male     Comment: tubal   Other Topics Concern    Not on file   Social History Narrative    Not on file     Social Determinants of Health     Financial Resource Strain: Low Risk  (4/3/2023)    Overall Financial Resource Strain (CARDIA)

## 2024-03-29 DIAGNOSIS — I10 PRIMARY HYPERTENSION: ICD-10-CM

## 2024-03-29 RX ORDER — HYDROCHLOROTHIAZIDE 12.5 MG/1
12.5 CAPSULE, GELATIN COATED ORAL EVERY MORNING
Qty: 90 CAPSULE | Refills: 1 | Status: SHIPPED | OUTPATIENT
Start: 2024-03-29

## 2024-03-29 NOTE — TELEPHONE ENCOUNTER
Future Appointments   Date Time Provider Department Center   4/4/2024  7:45 AM Rusty Hui DO [facility]         Last appt 3/20/24

## 2024-04-04 ENCOUNTER — OFFICE VISIT (OUTPATIENT)
Dept: BARIATRICS/WEIGHT MGMT | Age: 44
End: 2024-04-04
Payer: COMMERCIAL

## 2024-04-04 ENCOUNTER — OFFICE VISIT (OUTPATIENT)
Dept: INTERNAL MEDICINE CLINIC | Age: 44
End: 2024-04-04
Payer: COMMERCIAL

## 2024-04-04 VITALS
HEART RATE: 89 BPM | WEIGHT: 247 LBS | BODY MASS INDEX: 45.45 KG/M2 | DIASTOLIC BLOOD PRESSURE: 100 MMHG | SYSTOLIC BLOOD PRESSURE: 166 MMHG | HEIGHT: 62 IN

## 2024-04-04 VITALS
BODY MASS INDEX: 46.82 KG/M2 | WEIGHT: 248 LBS | SYSTOLIC BLOOD PRESSURE: 130 MMHG | DIASTOLIC BLOOD PRESSURE: 80 MMHG | HEIGHT: 61 IN

## 2024-04-04 DIAGNOSIS — E66.01 MORBID OBESITY WITH BMI OF 45.0-49.9, ADULT (HCC): ICD-10-CM

## 2024-04-04 DIAGNOSIS — I10 ESSENTIAL HYPERTENSION: ICD-10-CM

## 2024-04-04 DIAGNOSIS — G47.33 OBSTRUCTIVE SLEEP APNEA: ICD-10-CM

## 2024-04-04 DIAGNOSIS — E78.2 MIXED HYPERLIPIDEMIA: ICD-10-CM

## 2024-04-04 DIAGNOSIS — I10 PRIMARY HYPERTENSION: ICD-10-CM

## 2024-04-04 DIAGNOSIS — J45.909 MODERATE ASTHMA WITHOUT COMPLICATION, UNSPECIFIED WHETHER PERSISTENT: Primary | ICD-10-CM

## 2024-04-04 DIAGNOSIS — E88.810 METABOLIC SYNDROME: Primary | ICD-10-CM

## 2024-04-04 PROCEDURE — 99214 OFFICE O/P EST MOD 30 MIN: CPT | Performed by: SURGERY

## 2024-04-04 PROCEDURE — 3078F DIAST BP <80 MM HG: CPT | Performed by: SURGERY

## 2024-04-04 PROCEDURE — 99214 OFFICE O/P EST MOD 30 MIN: CPT | Performed by: STUDENT IN AN ORGANIZED HEALTH CARE EDUCATION/TRAINING PROGRAM

## 2024-04-04 PROCEDURE — 3079F DIAST BP 80-89 MM HG: CPT | Performed by: STUDENT IN AN ORGANIZED HEALTH CARE EDUCATION/TRAINING PROGRAM

## 2024-04-04 PROCEDURE — 3077F SYST BP >= 140 MM HG: CPT | Performed by: SURGERY

## 2024-04-04 PROCEDURE — 3075F SYST BP GE 130 - 139MM HG: CPT | Performed by: STUDENT IN AN ORGANIZED HEALTH CARE EDUCATION/TRAINING PROGRAM

## 2024-04-04 SDOH — ECONOMIC STABILITY: INCOME INSECURITY: HOW HARD IS IT FOR YOU TO PAY FOR THE VERY BASICS LIKE FOOD, HOUSING, MEDICAL CARE, AND HEATING?: NOT HARD AT ALL

## 2024-04-04 SDOH — ECONOMIC STABILITY: FOOD INSECURITY: WITHIN THE PAST 12 MONTHS, YOU WORRIED THAT YOUR FOOD WOULD RUN OUT BEFORE YOU GOT MONEY TO BUY MORE.: NEVER TRUE

## 2024-04-04 SDOH — ECONOMIC STABILITY: FOOD INSECURITY: WITHIN THE PAST 12 MONTHS, THE FOOD YOU BOUGHT JUST DIDN'T LAST AND YOU DIDN'T HAVE MONEY TO GET MORE.: NEVER TRUE

## 2024-04-04 ASSESSMENT — PATIENT HEALTH QUESTIONNAIRE - PHQ9
SUM OF ALL RESPONSES TO PHQ QUESTIONS 1-9: 0
SUM OF ALL RESPONSES TO PHQ9 QUESTIONS 1 & 2: 0
SUM OF ALL RESPONSES TO PHQ QUESTIONS 1-9: 0
SUM OF ALL RESPONSES TO PHQ QUESTIONS 1-9: 0
1. LITTLE INTEREST OR PLEASURE IN DOING THINGS: NOT AT ALL
SUM OF ALL RESPONSES TO PHQ QUESTIONS 1-9: 0
2. FEELING DOWN, DEPRESSED OR HOPELESS: NOT AT ALL

## 2024-04-04 ASSESSMENT — ENCOUNTER SYMPTOMS
EYE DISCHARGE: 0
EYES NEGATIVE: 1
SHORTNESS OF BREATH: 0
ALLERGIC/IMMUNOLOGIC NEGATIVE: 1
GASTROINTESTINAL NEGATIVE: 1
ABDOMINAL PAIN: 0

## 2024-04-04 NOTE — PROGRESS NOTES
Inna Steinberg (:  1980) is a 43 y.o. female,Established patient, here for evaluation of the following chief complaint(s):  Follow-up (Came in office for asthma flare up about 2 weeks ago 3/20/24. Pt stated she needed FMLA for that day )      Patient is here for routine follow-up and to obtain a letter as she missed her work at [employer] due to asthma flareup on 3/20.  She was seen in the ER and came for office visit and was seen by Dr. Orozco.  She  As per her she was using a nebulizer machine and continued work next day onwards.    ASSESSMENT/PLAN:  1. Moderate asthma without complication, unspecified whether persistent stable.  Continue with her inhaler DuoNebs and albuterol as needed.  Letter provided as she missed her work when she was seen on 3/20.  2. Primary hypertension currently stable, continue with nifedipine 60 mg along with hydrochlorothiazide.      Return in about 3 months (around 2024) for HTN, Blood work.         Subjective   SUBJECTIVE/OBJECTIVE:  HPI    Review of Systems   Constitutional: Negative.  Negative for chills and fever.   HENT: Negative.     Eyes: Negative.  Negative for discharge.   Respiratory: Negative.  Negative for shortness of breath.    Cardiovascular: Negative.  Negative for chest pain and palpitations.   Gastrointestinal: Negative.  Negative for abdominal pain.   Endocrine: Negative.    Genitourinary: Negative.    Musculoskeletal: Negative.    Skin: Negative.    Allergic/Immunologic: Negative.    Neurological: Negative.  Negative for dizziness and headaches.   Hematological: Negative.    Psychiatric/Behavioral: Negative.            Objective   Physical Exam  Vitals reviewed.   Constitutional:       Appearance: Normal appearance.   HENT:      Head: Normocephalic.   Eyes:      Extraocular Movements: Extraocular movements intact.      Pupils: Pupils are equal, round, and reactive to light.   Cardiovascular:      Rate and Rhythm: Normal

## 2024-04-04 NOTE — PROGRESS NOTES
Inna Steinberg gained 3 lbs over past ~month.  Pt states she feels like the scale is off here but may have some fluid fluctuation.  Reports eating out less, working from home now which she feels is a lot better.  Pt did just recently have prednisone treatment for asthma flare, last dose about a week ago.  Pt is interested in discussing MWM and AOM.  Pt is concerned with recovery with surgery and minimal support.    Is pt eating at least 4 times everyday?  2-3 x/day per pt report    B- HB eggs & toast  L- leftovers OR sandwich  D- baked OR air fried chicken OR turkey tacos & vegetables, some mac n' cheese    Is pt eating a lean protein source with all meals and snacks?  yes    Has pt decreased their portions using the plate method?  just doesn't eat a lot    Is pt choosing low fat/sugar free options?  reduced eating out    Is pt drinking at least 64 oz of clear liquids everyday? yes - water    Has pt stopped drinking carbonation, caffeinated, and sugar sweetened beverages?  sweet tea- will not change, some soda, pineapple & cranberry-grape juice    Has pt sampled Unjury and/or Nectar protein?  n/a    Has pt attended a support group? n/a    Participating in intentional exercise?  wts at her desk, planning to get a stepper      Plan/Recommendations:   - Focus on lean protein 4x/day  - Use 1200 nalini LCMP  - Purchase stepper   - Try diet juice    Handouts: 1200 nalini LCMP    Jhoana Hargrove RD, LD  
Outpatient Medications:     hydroCHLOROthiazide 12.5 MG capsule, TAKE 1 CAPSULE BY MOUTH EVERY MORNING, Disp: 90 capsule, Rfl: 1    albuterol (ACCUNEB) 0.63 MG/3ML nebulizer solution, Take 3 mLs by nebulization every 6 hours as needed for Wheezing, Disp: 270 mL, Rfl: 5    Respiratory Therapy Supplies (NEBULIZER/TUBING/MOUTHPIECE) KIT, 1 kit by Does not apply route daily as needed (wheezing), Disp: 1 kit, Rfl: 0    ipratropium 0.5 mg-albuterol 2.5 mg (DUONEB) 0.5-2.5 (3) MG/3ML SOLN nebulizer solution, Inhale 3 mLs into the lungs every 4 hours, Disp: 360 mL, Rfl: 2    Respiratory Therapy Supplies (NEBULIZER/TUBING/MOUTHPIECE) KIT, 1 kit by Does not apply route daily ICD J45.41, Disp: 1 kit, Rfl: 0    ibuprofen (ADVIL;MOTRIN) 800 MG tablet, TAKE 1 TABLET BY MOUTH THREE TIMES DAILY AS NEEDED FOR PAIN (Patient not taking: Reported on 3/20/2024), Disp: 30 tablet, Rfl: 0    NIFEdipine (PROCARDIA XL) 60 MG extended release tablet, TAKE 1 TABLET BY MOUTH DAILY (Patient not taking: Reported on 3/20/2024), Disp: 90 tablet, Rfl: 1    Elastic Bandages & Supports (KNEE BRACE) MISC, 1 each by Does not apply route daily (Patient not taking: Reported on 3/20/2024), Disp: 1 each, Rfl: 3      Review of Systems - History obtained from the patient  General ROS: negative  Psychological ROS: negative  Endocrine ROS: negative  Respiratory ROS: negative  Cardiovascular ROS: negative  Gastrointestinal ROS:negative  Genito-Urinary ROS: negative  Musculoskeletal ROS: negative   Skin ROS: negative    Physical Exam   Vitals Reviewed   Constitutional: Patient is oriented to person, place, and time. Patient appears well-developed and well-nourished. Patient is active and cooperative.  Non-toxic appearance. No distress.   Neck: Trachea normal and normal range of motion. No JVD present.   Pulmonary/Chest: Effort normal. No accessory muscle usage or stridor. No apnea. No respiratory distress.   Cardiovascular: Normal rate and no JVD.   Abdominal: 
for medication management and monitoring.  [x] Weight loss Recommended.    Obstructive Sleep Apnea:   [x] Continue to make dietary and lifestyle modifications.  [x] Reviewed the importance of wearing / compliance with CPAP/BIPAP.  [x] Continue to follow up with their sleep medicine provider for CPAP/BIPAP management and monitoring.  [x] Weight loss Recommended.    Metabolic Syndrome:   [x] Continue to make dietary and lifestyle modifications.  [x] Continue to follow up with their PCP for Medical Management.  [x] Weight loss Recommended.    Patient would like to try MWM as she does not have anyone to help her after surgery    Will refer and follow up in 6 months    Mirian MURRY MA am scribing for and in the presence of Dr. Korina DO on this date of 04/04/24 at 8:14 AM    Rusty MURRY DO personally performed the services described in this documentation as scribed by the Medical Assistant Mirian Mustafa in my presence and it is both accurate and complete. 04/04/24

## 2024-04-30 ENCOUNTER — TELEPHONE (OUTPATIENT)
Dept: INTERNAL MEDICINE CLINIC | Age: 44
End: 2024-04-30

## 2024-04-30 DIAGNOSIS — M25.561 ACUTE PAIN OF RIGHT KNEE: ICD-10-CM

## 2024-04-30 RX ORDER — IBUPROFEN 800 MG/1
TABLET ORAL
Qty: 30 TABLET | Refills: 0 | OUTPATIENT
Start: 2024-04-30

## 2024-04-30 NOTE — TELEPHONE ENCOUNTER
Future Appointments   Date Time Provider Department Center   6/5/2024 10:30 AM Terri Mendoza MD [facility]       Last appt 4/4/24

## 2024-04-30 NOTE — TELEPHONE ENCOUNTER
Patient called asking for a refill of ibuprofen (ADVIL;MOTRIN) 800 MG tablet     Please send to [id number] - [place], [place] - [address] - [place] [phone] - F [phone]

## 2024-05-01 DIAGNOSIS — M25.561 ACUTE PAIN OF RIGHT KNEE: ICD-10-CM

## 2024-05-01 NOTE — TELEPHONE ENCOUNTER
Patient called again requesting a refill of ibuprofen (ADVIL;MOTRIN) 800 MG tablet      Please send to [facility] #[id number] - [place], [place] - [address] [phone] - F [phone]     Thank you

## 2024-05-02 RX ORDER — IBUPROFEN 800 MG/1
800 TABLET ORAL 3 TIMES DAILY PRN
Qty: 30 TABLET | Refills: 0 | OUTPATIENT
Start: 2024-05-02

## 2024-05-15 DIAGNOSIS — M25.561 ACUTE PAIN OF RIGHT KNEE: ICD-10-CM

## 2024-05-15 RX ORDER — IBUPROFEN 800 MG/1
800 TABLET ORAL 3 TIMES DAILY PRN
Qty: 30 TABLET | Refills: 1 | Status: SHIPPED | OUTPATIENT
Start: 2024-05-15

## 2024-05-15 NOTE — TELEPHONE ENCOUNTER
She takes the ibuprofen for pain, she has arthritis. She also uses it for breast pain when she starts her period. It really helps.     Can you please refill and send it to     [facility] [phone]    Please call her back at [phone].

## 2024-06-05 ENCOUNTER — OFFICE VISIT (OUTPATIENT)
Dept: INTERNAL MEDICINE CLINIC | Age: 44
End: 2024-06-05

## 2024-06-05 VITALS
DIASTOLIC BLOOD PRESSURE: 82 MMHG | WEIGHT: 245 LBS | BODY MASS INDEX: 45.55 KG/M2 | SYSTOLIC BLOOD PRESSURE: 136 MMHG | TEMPERATURE: 98.2 F | HEART RATE: 74 BPM | OXYGEN SATURATION: 99 %

## 2024-06-05 DIAGNOSIS — Z12.31 ENCOUNTER FOR SCREENING MAMMOGRAM FOR BREAST CANCER: ICD-10-CM

## 2024-06-05 DIAGNOSIS — Z12.11 SCREENING FOR COLORECTAL CANCER: ICD-10-CM

## 2024-06-05 DIAGNOSIS — M25.561 ACUTE PAIN OF RIGHT KNEE: ICD-10-CM

## 2024-06-05 DIAGNOSIS — Z00.00 ENCOUNTER FOR WELL ADULT EXAM WITHOUT ABNORMAL FINDINGS: ICD-10-CM

## 2024-06-05 DIAGNOSIS — Z12.12 SCREENING FOR COLORECTAL CANCER: ICD-10-CM

## 2024-06-05 DIAGNOSIS — Z00.00 ENCOUNTER FOR WELL ADULT EXAM WITHOUT ABNORMAL FINDINGS: Primary | ICD-10-CM

## 2024-06-05 DIAGNOSIS — E55.9 VITAMIN D DEFICIENCY: ICD-10-CM

## 2024-06-05 DIAGNOSIS — I10 PRIMARY HYPERTENSION: ICD-10-CM

## 2024-06-05 DIAGNOSIS — Z71.89 ACP (ADVANCE CARE PLANNING): ICD-10-CM

## 2024-06-05 LAB
25(OH)D3 SERPL-MCNC: 14.3 NG/ML
ALBUMIN SERPL-MCNC: 4 G/DL (ref 3.4–5)
ALBUMIN/GLOB SERPL: 1.1 {RATIO} (ref 1.1–2.2)
ALP SERPL-CCNC: 72 U/L (ref 40–129)
ALT SERPL-CCNC: 15 U/L (ref 10–40)
ANION GAP SERPL CALCULATED.3IONS-SCNC: 18 MMOL/L (ref 3–16)
AST SERPL-CCNC: 13 U/L (ref 15–37)
BILIRUB SERPL-MCNC: <0.2 MG/DL (ref 0–1)
BUN SERPL-MCNC: 10 MG/DL (ref 7–20)
CALCIUM SERPL-MCNC: 9.2 MG/DL (ref 8.3–10.6)
CHLORIDE SERPL-SCNC: 100 MMOL/L (ref 99–110)
CHOLEST SERPL-MCNC: 196 MG/DL (ref 0–199)
CO2 SERPL-SCNC: 21 MMOL/L (ref 21–32)
CREAT SERPL-MCNC: 0.7 MG/DL (ref 0.6–1.1)
GFR SERPLBLD CREATININE-BSD FMLA CKD-EPI: >90 ML/MIN/{1.73_M2}
GLUCOSE SERPL-MCNC: 95 MG/DL (ref 70–99)
HDLC SERPL-MCNC: 46 MG/DL (ref 40–60)
LDL CHOLESTEROL: 124 MG/DL
POTASSIUM SERPL-SCNC: 4.3 MMOL/L (ref 3.5–5.1)
PROT SERPL-MCNC: 7.7 G/DL (ref 6.4–8.2)
SODIUM SERPL-SCNC: 139 MMOL/L (ref 136–145)
TRIGL SERPL-MCNC: 128 MG/DL (ref 0–150)
TSH SERPL DL<=0.005 MIU/L-ACNC: 2.19 UIU/ML (ref 0.27–4.2)
VLDLC SERPL CALC-MCNC: 26 MG/DL

## 2024-06-05 RX ORDER — IBUPROFEN 800 MG/1
800 TABLET ORAL 3 TIMES DAILY PRN
Qty: 30 TABLET | Refills: 1 | Status: SHIPPED | OUTPATIENT
Start: 2024-06-05 | End: 2024-06-05 | Stop reason: SDUPTHER

## 2024-06-05 RX ORDER — IBUPROFEN 800 MG/1
800 TABLET ORAL 3 TIMES DAILY PRN
Qty: 30 TABLET | Refills: 5 | Status: SHIPPED | OUTPATIENT
Start: 2024-06-05

## 2024-06-05 ASSESSMENT — ENCOUNTER SYMPTOMS
GASTROINTESTINAL NEGATIVE: 1
ALLERGIC/IMMUNOLOGIC NEGATIVE: 1
RESPIRATORY NEGATIVE: 1
EYES NEGATIVE: 1
ABDOMINAL PAIN: 0
EYE DISCHARGE: 0
SHORTNESS OF BREATH: 0

## 2024-06-05 NOTE — PROGRESS NOTES
D 25 [facility]xy         Personalized Preventive Plan   Current Health Maintenance Status  Immunization History   Administered Date(s) Administered    Hepatitis B 04/05/2016, 05/06/2016    Influenza Virus Vaccine 12/05/2018    PPD Test 03/30/2015, 04/07/2015, 03/21/2016, 03/29/2016    TDaP, ADACEL (age 10y-64y), BOOSTRIX (age 10y+), IM, 0.5mL 07/14/2009, 03/31/2016        Health Maintenance   Topic Date Due    Pneumococcal 0-64 years Vaccine (1 of 2 - PCV) Never done    Hepatitis B vaccine (3 of 3 - 19+ 3-dose series) 10/05/2016    Breast cancer screen  Never done    COVID-19 Vaccine (2 - 2023-24 season) 09/01/2023    Colorectal Cancer Screen  Never done    Cervical cancer screen  04/19/2024    Depression Screen  04/04/2025    DTaP/Tdap/Td vaccine (3 - Td or Tdap) 03/31/2026    Lipids  02/01/2029    Flu vaccine  Completed    Hepatitis C screen  Completed    HIV screen  Completed    Hepatitis A vaccine  Aged Out    Hib vaccine  Aged Out    HPV vaccine  Aged Out    Polio vaccine  Aged Out    Meningococcal (ACWY) vaccine  Aged Out    Diabetes screen  Discontinued     Recommendations for Preventive Services Due: see orders and patient instructions/AVS.    Return in about 6 months (around 12/5/2024) for HTN.

## 2024-06-05 NOTE — PATIENT INSTRUCTIONS
of more than 40 inches in men or 35 inches in women can increase the risk of weight-related health problems.  Talk with your doctor about steps you can take to stay healthy or improve your health. You may need to make lifestyle changes to lose weight and stay healthy, such as changing your diet and getting regular exercise.  If you have a BMI lower than 18.5  Your doctor may do other tests to check your risk for health problems.  Talk with your doctor about steps you can take to stay healthy or improve your health. You may need to make lifestyle changes to gain or maintain weight and stay healthy, such as getting more healthy foods in your diet and doing exercises to build muscle.  Where can you learn more?  Go to https://www.healthHireWheel.net/patientEd and enter S176 to learn more about \"Body Mass Index: Care Instructions.\"  Current as of: May 13, 2023  Content Version: 14.1  © 2606-3836 [employer].   Care instructions adapted under license by [employer]. If you have questions about a medical condition or this instruction, always ask your healthcare professional. [employer] disclaims any warranty or liability for your use of this information.       Zygomaticofacial Flap Text: Given the location of the defect, shape of the defect and the proximity to free margins a zygomaticofacial flap was deemed most appropriate for repair.  Using a sterile surgical marker, the appropriate flap was drawn incorporating the defect and placing the expected incisions within the relaxed skin tension lines where possible. The area thus outlined was incised deep to adipose tissue with a #15 scalpel blade with preservation of a vascular pedicle.  The skin margins were undermined to an appropriate distance in all directions utilizing iris scissors.  The flap was then placed into the defect and anchored with interrupted buried subcutaneous sutures.

## 2024-06-06 DIAGNOSIS — E55.9 VITAMIN D DEFICIENCY: Primary | ICD-10-CM

## 2024-06-06 RX ORDER — ERGOCALCIFEROL 1.25 MG/1
50000 CAPSULE ORAL WEEKLY
Qty: 12 CAPSULE | Refills: 1 | Status: SHIPPED | OUTPATIENT
Start: 2024-06-06

## 2024-07-15 ENCOUNTER — HOSPITAL ENCOUNTER (OUTPATIENT)
Dept: MAMMOGRAPHY | Age: 44
Discharge: HOME | End: 2024-07-20
Payer: COMMERCIAL

## 2024-07-15 DIAGNOSIS — Z12.31 VISIT FOR SCREENING MAMMOGRAM: ICD-10-CM

## 2024-07-15 PROCEDURE — 77063 BREAST TOMOSYNTHESIS BI: CPT

## 2024-07-16 DIAGNOSIS — R92.8 ABNORMAL MAMMOGRAM: Primary | ICD-10-CM

## 2024-09-27 LAB — NONINV COLON CA DNA+OCC BLD SCRN STL QL: NEGATIVE

## 2025-01-14 ENCOUNTER — TELEPHONE (OUTPATIENT)
Dept: INTERNAL MEDICINE CLINIC | Age: 45
End: 2025-01-14

## 2025-01-14 NOTE — TELEPHONE ENCOUNTER
Pt is requesting refills for her    hydroCHLOROthiazide (HYDRODIURIL) tablet 25 mg     amLODIPine (NORVASC) 5 MG tablet     [facility]    Thank you

## 2025-01-23 ENCOUNTER — APPOINTMENT (OUTPATIENT)
Dept: GENERAL RADIOLOGY | Age: 45
End: 2025-01-23
Payer: COMMERCIAL

## 2025-01-23 ENCOUNTER — APPOINTMENT (OUTPATIENT)
Dept: NUCLEAR MEDICINE | Age: 45
End: 2025-01-23
Attending: INTERNAL MEDICINE
Payer: COMMERCIAL

## 2025-01-23 ENCOUNTER — APPOINTMENT (OUTPATIENT)
Age: 45
End: 2025-01-23
Attending: INTERNAL MEDICINE
Payer: COMMERCIAL

## 2025-01-23 ENCOUNTER — HOSPITAL ENCOUNTER (OUTPATIENT)
Age: 45
Setting detail: OBSERVATION
Discharge: HOME | End: 2025-01-23
Attending: INTERNAL MEDICINE | Admitting: INTERNAL MEDICINE
Payer: COMMERCIAL

## 2025-01-23 VITALS
OXYGEN SATURATION: 95 % | HEIGHT: 61 IN | WEIGHT: 240.96 LBS | DIASTOLIC BLOOD PRESSURE: 85 MMHG | TEMPERATURE: 98.1 F | RESPIRATION RATE: 16 BRPM | SYSTOLIC BLOOD PRESSURE: 146 MMHG | HEART RATE: 80 BPM | BODY MASS INDEX: 45.49 KG/M2

## 2025-01-23 DIAGNOSIS — R07.9 CHEST PAIN, UNSPECIFIED TYPE: Primary | ICD-10-CM

## 2025-01-23 DIAGNOSIS — I10 UNCONTROLLED HYPERTENSION: ICD-10-CM

## 2025-01-23 PROBLEM — R07.89 ATYPICAL CHEST PAIN: Status: ACTIVE | Noted: 2025-01-23

## 2025-01-23 LAB
ALBUMIN SERPL-MCNC: 3.8 G/DL (ref 3.4–5)
ALBUMIN/GLOB SERPL: 1.1 {RATIO} (ref 1.1–2.2)
ALP SERPL-CCNC: 77 U/L (ref 40–129)
ALT SERPL-CCNC: 21 U/L (ref 10–40)
ANION GAP SERPL CALCULATED.3IONS-SCNC: 12 MMOL/L (ref 3–16)
AST SERPL-CCNC: 21 U/L (ref 15–37)
BASOPHILS # BLD: 0.1 K/UL (ref 0–0.2)
BASOPHILS NFR BLD: 1.3 %
BILIRUB SERPL-MCNC: <0.2 MG/DL (ref 0–1)
BUN SERPL-MCNC: 14 MG/DL (ref 7–20)
CALCIUM SERPL-MCNC: 9 MG/DL (ref 8.3–10.6)
CHLORIDE SERPL-SCNC: 102 MMOL/L (ref 99–110)
CHOLEST SERPL-MCNC: 195 MG/DL (ref 0–199)
CO2 SERPL-SCNC: 26 MMOL/L (ref 21–32)
CREAT SERPL-MCNC: 0.9 MG/DL (ref 0.6–1.1)
D-DIMER QUANTITATIVE: <0.27 UG/ML FEU (ref 0–0.6)
DEPRECATED RDW RBC AUTO: 14.9 % (ref 12.4–15.4)
EKG ATRIAL RATE: 82 BPM
EKG DIAGNOSIS: NORMAL
EKG P AXIS: 41 DEGREES
EKG P-R INTERVAL: 162 MS
EKG Q-T INTERVAL: 388 MS
EKG QRS DURATION: 82 MS
EKG QTC CALCULATION (BAZETT): 453 MS
EKG R AXIS: 12 DEGREES
EKG T AXIS: 55 DEGREES
EKG VENTRICULAR RATE: 82 BPM
EOSINOPHIL # BLD: 0.4 K/UL (ref 0–0.6)
EOSINOPHIL NFR BLD: 4.1 %
EST. AVERAGE GLUCOSE BLD GHB EST-MCNC: 111.2 MG/DL
GFR SERPLBLD CREATININE-BSD FMLA CKD-EPI: 80 ML/MIN/{1.73_M2}
GLUCOSE SERPL-MCNC: 112 MG/DL (ref 70–99)
HBA1C MFR BLD: 5.5 %
HCG SERPL QL: NEGATIVE
HCT VFR BLD AUTO: 37.8 % (ref 36–48)
HDLC SERPL-MCNC: 46 MG/DL (ref 40–60)
HGB BLD-MCNC: 12.5 G/DL (ref 12–16)
LDLC SERPL CALC-MCNC: 132 MG/DL
LYMPHOCYTES # BLD: 3.2 K/UL (ref 1–5.1)
LYMPHOCYTES NFR BLD: 32.8 %
MCH RBC QN AUTO: 27 PG (ref 26–34)
MCHC RBC AUTO-ENTMCNC: 32.9 G/DL (ref 31–36)
MCV RBC AUTO: 81.9 FL (ref 80–100)
MONOCYTES # BLD: 0.5 K/UL (ref 0–1.3)
MONOCYTES NFR BLD: 5.3 %
NEUTROPHILS # BLD: 5.6 K/UL (ref 1.7–7.7)
NEUTROPHILS NFR BLD: 56.5 %
PLATELET # BLD AUTO: 413 K/UL (ref 135–450)
PMV BLD AUTO: 7.3 FL (ref 5–10.5)
POTASSIUM SERPL-SCNC: 3.8 MMOL/L (ref 3.5–5.1)
PROT SERPL-MCNC: 7.4 G/DL (ref 6.4–8.2)
RBC # BLD AUTO: 4.62 M/UL (ref 4–5.2)
SODIUM SERPL-SCNC: 140 MMOL/L (ref 136–145)
TRIGL SERPL-MCNC: 84 MG/DL (ref 0–150)
TROPONIN, HIGH SENSITIVITY: 7 NG/L (ref 0–14)
TROPONIN, HIGH SENSITIVITY: 8 NG/L (ref 0–14)
TROPONIN, HIGH SENSITIVITY: 8 NG/L (ref 0–14)
TSH SERPL DL<=0.005 MIU/L-ACNC: 3.6 UIU/ML (ref 0.27–4.2)
VLDLC SERPL CALC-MCNC: 17 MG/DL
WBC # BLD AUTO: 9.9 K/UL (ref 4–11)

## 2025-01-23 PROCEDURE — 85025 COMPLETE CBC W/AUTO DIFF WBC: CPT

## 2025-01-23 PROCEDURE — 6370000000 HC RX 637 (ALT 250 FOR IP): Performed by: PHYSICIAN ASSISTANT

## 2025-01-23 PROCEDURE — 84443 ASSAY THYROID STIM HORMONE: CPT

## 2025-01-23 PROCEDURE — 80053 COMPREHEN METABOLIC PANEL: CPT

## 2025-01-23 PROCEDURE — G0378 HOSPITAL OBSERVATION PER HR: HCPCS

## 2025-01-23 PROCEDURE — 85379 FIBRIN DEGRADATION QUANT: CPT

## 2025-01-23 PROCEDURE — 84484 ASSAY OF TROPONIN QUANT: CPT

## 2025-01-23 PROCEDURE — 83036 HEMOGLOBIN GLYCOSYLATED A1C: CPT

## 2025-01-23 PROCEDURE — 93005 ELECTROCARDIOGRAM TRACING: CPT | Performed by: INTERNAL MEDICINE

## 2025-01-23 PROCEDURE — 71045 X-RAY EXAM CHEST 1 VIEW: CPT

## 2025-01-23 PROCEDURE — 84703 CHORIONIC GONADOTROPIN ASSAY: CPT

## 2025-01-23 PROCEDURE — 93010 ELECTROCARDIOGRAM REPORT: CPT | Performed by: INTERNAL MEDICINE

## 2025-01-23 PROCEDURE — 99285 EMERGENCY DEPT VISIT HI MDM: CPT

## 2025-01-23 PROCEDURE — 80061 LIPID PANEL: CPT

## 2025-01-23 PROCEDURE — 36415 COLL VENOUS BLD VENIPUNCTURE: CPT

## 2025-01-23 RX ORDER — REGADENOSON 0.08 MG/ML
0.4 INJECTION, SOLUTION INTRAVENOUS
Status: CANCELLED | OUTPATIENT
Start: 2025-01-23

## 2025-01-23 RX ORDER — ONDANSETRON 4 MG/1
4 TABLET, ORALLY DISINTEGRATING ORAL EVERY 8 HOURS PRN
Status: DISCONTINUED | OUTPATIENT
Start: 2025-01-23 | End: 2025-01-23 | Stop reason: HOSPADM

## 2025-01-23 RX ORDER — POTASSIUM CHLORIDE 7.45 MG/ML
10 INJECTION INTRAVENOUS PRN
Status: DISCONTINUED | OUTPATIENT
Start: 2025-01-23 | End: 2025-01-23 | Stop reason: HOSPADM

## 2025-01-23 RX ORDER — NITROGLYCERIN 0.4 MG/1
0.4 TABLET SUBLINGUAL EVERY 5 MIN PRN
Status: DISCONTINUED | OUTPATIENT
Start: 2025-01-23 | End: 2025-01-23 | Stop reason: HOSPADM

## 2025-01-23 RX ORDER — ACETAMINOPHEN 650 MG/1
650 SUPPOSITORY RECTAL EVERY 6 HOURS PRN
Status: DISCONTINUED | OUTPATIENT
Start: 2025-01-23 | End: 2025-01-23 | Stop reason: HOSPADM

## 2025-01-23 RX ORDER — ACETAMINOPHEN 325 MG/1
650 TABLET ORAL EVERY 6 HOURS PRN
Status: DISCONTINUED | OUTPATIENT
Start: 2025-01-23 | End: 2025-01-23 | Stop reason: HOSPADM

## 2025-01-23 RX ORDER — ASPIRIN 81 MG/1
81 TABLET, CHEWABLE ORAL DAILY
Status: DISCONTINUED | OUTPATIENT
Start: 2025-01-24 | End: 2025-01-23 | Stop reason: HOSPADM

## 2025-01-23 RX ORDER — SODIUM CHLORIDE 0.9 % (FLUSH) 0.9 %
5-40 SYRINGE (ML) INJECTION PRN
Status: DISCONTINUED | OUTPATIENT
Start: 2025-01-23 | End: 2025-01-23 | Stop reason: HOSPADM

## 2025-01-23 RX ORDER — METOPROLOL TARTRATE 25 MG/1
25 TABLET, FILM COATED ORAL 2 TIMES DAILY
Qty: 60 TABLET | Refills: 3 | Status: SHIPPED | OUTPATIENT
Start: 2025-01-23 | End: 2025-01-24

## 2025-01-23 RX ORDER — ATORVASTATIN CALCIUM 40 MG/1
40 TABLET, FILM COATED ORAL NIGHTLY
Status: DISCONTINUED | OUTPATIENT
Start: 2025-01-23 | End: 2025-01-23 | Stop reason: HOSPADM

## 2025-01-23 RX ORDER — ONDANSETRON 2 MG/ML
4 INJECTION INTRAMUSCULAR; INTRAVENOUS EVERY 6 HOURS PRN
Status: DISCONTINUED | OUTPATIENT
Start: 2025-01-23 | End: 2025-01-23 | Stop reason: HOSPADM

## 2025-01-23 RX ORDER — SODIUM CHLORIDE 9 MG/ML
INJECTION, SOLUTION INTRAVENOUS PRN
Status: DISCONTINUED | OUTPATIENT
Start: 2025-01-23 | End: 2025-01-23 | Stop reason: HOSPADM

## 2025-01-23 RX ORDER — SODIUM CHLORIDE 0.9 % (FLUSH) 0.9 %
5-40 SYRINGE (ML) INJECTION EVERY 12 HOURS SCHEDULED
Status: DISCONTINUED | OUTPATIENT
Start: 2025-01-23 | End: 2025-01-23 | Stop reason: HOSPADM

## 2025-01-23 RX ORDER — LABETALOL HYDROCHLORIDE 5 MG/ML
5 INJECTION, SOLUTION INTRAVENOUS ONCE
Status: DISCONTINUED | OUTPATIENT
Start: 2025-01-23 | End: 2025-01-23

## 2025-01-23 RX ORDER — POTASSIUM CHLORIDE 1500 MG/1
40 TABLET, EXTENDED RELEASE ORAL PRN
Status: DISCONTINUED | OUTPATIENT
Start: 2025-01-23 | End: 2025-01-23 | Stop reason: HOSPADM

## 2025-01-23 RX ORDER — POLYETHYLENE GLYCOL 3350 17 G/17G
17 POWDER, FOR SOLUTION ORAL DAILY PRN
Status: DISCONTINUED | OUTPATIENT
Start: 2025-01-23 | End: 2025-01-23 | Stop reason: HOSPADM

## 2025-01-23 RX ORDER — MAGNESIUM SULFATE IN WATER 40 MG/ML
2000 INJECTION, SOLUTION INTRAVENOUS PRN
Status: DISCONTINUED | OUTPATIENT
Start: 2025-01-23 | End: 2025-01-23 | Stop reason: HOSPADM

## 2025-01-23 RX ORDER — ENOXAPARIN SODIUM 100 MG/ML
30 INJECTION SUBCUTANEOUS 2 TIMES DAILY
Status: DISCONTINUED | OUTPATIENT
Start: 2025-01-23 | End: 2025-01-23 | Stop reason: HOSPADM

## 2025-01-23 RX ORDER — METOPROLOL TARTRATE 25 MG/1
25 TABLET, FILM COATED ORAL 2 TIMES DAILY
Status: DISCONTINUED | OUTPATIENT
Start: 2025-01-23 | End: 2025-01-23 | Stop reason: HOSPADM

## 2025-01-23 RX ORDER — MAGNESIUM HYDROXIDE/ALUMINUM HYDROXICE/SIMETHICONE 120; 1200; 1200 MG/30ML; MG/30ML; MG/30ML
30 SUSPENSION ORAL EVERY 6 HOURS PRN
Status: DISCONTINUED | OUTPATIENT
Start: 2025-01-23 | End: 2025-01-23 | Stop reason: HOSPADM

## 2025-01-23 RX ORDER — VALSARTAN 80 MG/1
80 TABLET ORAL DAILY
Status: DISCONTINUED | OUTPATIENT
Start: 2025-01-23 | End: 2025-01-23 | Stop reason: HOSPADM

## 2025-01-23 RX ORDER — ASPIRIN 81 MG/1
324 TABLET, CHEWABLE ORAL ONCE
Status: COMPLETED | OUTPATIENT
Start: 2025-01-23 | End: 2025-01-23

## 2025-01-23 RX ADMIN — ASPIRIN 324 MG: 81 TABLET, CHEWABLE ORAL at 01:47

## 2025-01-23 ASSESSMENT — ENCOUNTER SYMPTOMS
SHORTNESS OF BREATH: 0
VOMITING: 0
ABDOMINAL PAIN: 0
COLOR CHANGE: 0
BACK PAIN: 0

## 2025-01-23 ASSESSMENT — PAIN DESCRIPTION - ONSET
ONSET: ON-GOING
ONSET: ON-GOING

## 2025-01-23 ASSESSMENT — PAIN DESCRIPTION - DESCRIPTORS
DESCRIPTORS: TINGLING;NUMBNESS;ACHING
DESCRIPTORS: ACHING

## 2025-01-23 ASSESSMENT — PAIN SCALES - GENERAL
PAINLEVEL_OUTOF10: 5
PAINLEVEL_OUTOF10: 0
PAINLEVEL_OUTOF10: 5

## 2025-01-23 ASSESSMENT — PAIN DESCRIPTION - LOCATION
LOCATION: CHEST
LOCATION: CHEST

## 2025-01-23 ASSESSMENT — LIFESTYLE VARIABLES
HOW MANY STANDARD DRINKS CONTAINING ALCOHOL DO YOU HAVE ON A TYPICAL DAY: 1 OR 2
HOW OFTEN DO YOU HAVE A DRINK CONTAINING ALCOHOL: MONTHLY OR LESS

## 2025-01-23 ASSESSMENT — PAIN DESCRIPTION - ORIENTATION
ORIENTATION: MID
ORIENTATION: MID

## 2025-01-23 ASSESSMENT — PAIN - FUNCTIONAL ASSESSMENT: PAIN_FUNCTIONAL_ASSESSMENT: PREVENTS OR INTERFERES SOME ACTIVE ACTIVITIES AND ADLS

## 2025-01-23 ASSESSMENT — HEART SCORE: ECG: NON-SPECIFC REPOLARIZATION DISTURBANCE/LBTB/PM

## 2025-01-23 ASSESSMENT — PAIN DESCRIPTION - FREQUENCY
FREQUENCY: CONTINUOUS
FREQUENCY: INTERMITTENT

## 2025-01-23 ASSESSMENT — PAIN DESCRIPTION - DIRECTION: RADIATING_TOWARDS: L ARM

## 2025-01-23 ASSESSMENT — PAIN DESCRIPTION - PAIN TYPE: TYPE: ACUTE PAIN

## 2025-01-23 NOTE — ED NOTES
ED TO INPATIENT SBAR HANDOFF    Patient Name: Inna Steinberg   Preferred Name: Inna  : 1980  44 y.o.   Family/Caregiver Present: no   Code Status Order: Full Code  PO Status: NPO:No  Telemetry Order: Yes  C-SSRS: Risk of Suicide: No Risk  Sitter no  NA  Restraints:     Sepsis Risk Score      Situation  Chief Complaint   Patient presents with    Chest Pain     Pt c/o of chest pain \"on and off for a few days\". Pt states pain radiates to the middle of her chest.  pt denies any cardiac hx.     Hypertension     Pt states she has been out of her BP meds for 2 weeks. Pt BP in triage is 257/128.     Brief Description of Patient's Condition: chest pain/hypertension  Mental Status: oriented, alert, coherent, logical, thought processes intact, and able to concentrate and follow conversation  Arrived from:Home  Imaging:   XR CHEST PORTABLE   Final Result   1. No acute cardiopulmonary process.   2. Elevated right hemidiaphragm with minimal atelectasis along the right   hemidiaphragm.           Abnormal labs:   Abnormal Labs Reviewed   COMPREHENSIVE METABOLIC PANEL W/ REFLEX TO MG FOR LOW K - Abnormal; Notable for the following components:       Result Value    Glucose 112 (*)     All other components within normal limits       Background  Allergies:   Allergies   Allergen Reactions    Ace Inhibitors      cough    Amlodipine      History:   Past Medical History:   Diagnosis Date    Abnormal Pap smear of cervix     Arthritis     Essential hypertension     Hyperlipidemia 2018    Iron deficiency 2019    Metabolic syndrome     Mixed hyperlipidemia     Morbid obesity with BMI of 40.0-44.9, adult 2018    Obstructive sleep apnea     Pre-operative clearance     Rheumatoid arthritis (HCC)     Sleep apnea        Assessment  Vitals:        Vitals:    25 0445 25 0500 25 0515 25 0530   BP: 134/74 123/67 121/76 121/72   Pulse: 88 85 87 90   Resp:    Temp:       TempSrc:

## 2025-01-23 NOTE — H&P
V2.0  History and Physical      Name:  Inna Steinberg /Age/Sex: 1980  (44 y.o. female)   MRN & CSN:  [id number] & [id number] Encounter Date/Time: 2025 4:42 AM EST   Location:  [address] [place] [address] PCP: Pk Luther MD       Hospital Day: 1    Assessment and Plan:   Inna Steinberg is a 44 y.o. morbidly obese female non-smoker with a pmh of ELIANA, mixed hyperlipidemia, iron deficiency, essential hypertension, osteoarthritis and rheumatoid arthritis who presents with Atypical chest pain and uncontrolled hypertension.    Hospital Problems             Last Modified POA    * (Principal) Atypical chest pain 2025 Yes    Uncontrolled hypertension 2025 Yes    ELIANA (obstructive sleep apnea) 2025 Yes       Plan:  Aspirin, beta-blocker, statin, as needed nitroglycerin, Lexiscan for further risk stratification, monitor on telemetry, check fasting lipids, glycosylated hemoglobin, D-dimer  Check UA, ARB, as needed hydralazine  Dietary education given    Disposition:   Current Living situation: Home  Expected Disposition: Home  Estimated D/C: 2 days    Diet ADULT DIET; Regular; 5 carb choices (75 gm/meal); Low Fat/Low Chol/High Fiber/LEIDA; No Caffeine  Diet NPO   DVT Prophylaxis [x] Lovenox, []  Heparin, [] SCDs, [] Ambulation,  [] Eliquis, [] Xarelto, [] Coumadin   Code Status Full Code   Surrogate Decision Maker/ POA Daughter     Personally reviewed Lab Studies and Imaging     Discussed management of the case with ED provider who recommended admission.    EKG interpreted personally and results normal sinus rhythm with a ventricular rate of 82, QTc of 453, no acute ischemic changes.    Imaging that was interpreted personally includes chest x-ray and results no acute cardiopulmonary process, old elevation of right hemidiaphragm.    Drugs that require monitoring for toxicity include none and the method of monitoring was n/a.        History from:     patient    History of Present Illness:     Chief

## 2025-01-23 NOTE — ED TRIAGE NOTES
Pt arrived to the ED via private car for chest pain and hypertension. Pt c/o of chest pain \"on and off for a few days\". Pt states pain radiates to the middle of her chest. pt denies any cardiac hx. Pt states she has been out of her BP meds for 2 weeks. Pt BP in triage is 257/128. Pt A&Ox4.

## 2025-01-23 NOTE — ED PROVIDER NOTES
procedures.  There was a high probability of clinically significant/life threatening deterioration in the patient's condition which required my urgent intervention. Not limited to multiple reexaminations, discussions with attending physician and consultants.    PAST MEDICAL HISTORY      has a past medical history of Abnormal Pap smear of cervix, Arthritis, Essential hypertension, Hyperlipidemia (12/19/2018), Iron deficiency (08/26/2019), Metabolic syndrome, Mixed hyperlipidemia, Morbid obesity with BMI of 40.0-44.9, adult (05/28/2018), Obstructive sleep apnea, Pre-operative clearance, Rheumatoid arthritis (HCC), and Sleep apnea.     EMERGENCY DEPARTMENT COURSE and DIFFERENTIAL DIAGNOSIS/MDM:   Vitals:    Vitals:    01/23/25 0530 01/23/25 0633 01/23/25 0736 01/23/25 1102   BP: 121/72 134/81 (!) 147/71 (!) 146/85   Pulse: 90 72 80 80   Resp: 23 17 16 16   Temp:  98.4 °F (36.9 °C) 98.4 °F (36.9 °C) 98.1 °F (36.7 °C)   TempSrc:  Oral Oral Oral   SpO2: 94% 93% 96% 95%   Weight:       Height:           Patient was given the following medications:  Medications   aspirin chewable tablet 324 mg (324 mg Oral Given 1/23/25 0147)             Is this patient to be included in the SEP-1 Core Measure due to severe sepsis or septic shock?   No   Exclusion criteria - the patient is NOT to be included for SEP-1 Core Measure due to:  Infection is not suspected    CONSULTS: (Who and What was discussed)  IP CONSULT TO HOSPITALIST  Discussion with Other Profesionals : None    Social Determinants : None    Records Reviewed : None    CC/HPI Summary, DDx, ED Course, and Reassessment: patient has a heart score of five. Her blood pressure was markedly elevated on arrival over 200 systolic but with aspirin/time and no other intervention blood pressure came down to 158/86. Last stress test in 2004. It will consult hospitalist for admission the patient is agreeable.     I am the Primary Clinician of Record.    FINAL IMPRESSION      1. Chest

## 2025-01-24 ENCOUNTER — OFFICE VISIT (OUTPATIENT)
Dept: INTERNAL MEDICINE CLINIC | Age: 45
End: 2025-01-24
Payer: COMMERCIAL

## 2025-01-24 VITALS
BODY MASS INDEX: 44.97 KG/M2 | WEIGHT: 238 LBS | DIASTOLIC BLOOD PRESSURE: 78 MMHG | OXYGEN SATURATION: 96 % | HEART RATE: 87 BPM | SYSTOLIC BLOOD PRESSURE: 130 MMHG

## 2025-01-24 DIAGNOSIS — E66.813 CLASS 3 SEVERE OBESITY WITH SERIOUS COMORBIDITY AND BODY MASS INDEX (BMI) OF 40.0 TO 44.9 IN ADULT, UNSPECIFIED OBESITY TYPE: ICD-10-CM

## 2025-01-24 DIAGNOSIS — R07.9 CHEST PAIN, UNSPECIFIED TYPE: ICD-10-CM

## 2025-01-24 DIAGNOSIS — Z09 FOLLOW-UP EXAM: Primary | ICD-10-CM

## 2025-01-24 DIAGNOSIS — G47.33 OSA (OBSTRUCTIVE SLEEP APNEA): ICD-10-CM

## 2025-01-24 DIAGNOSIS — E66.01 CLASS 3 SEVERE OBESITY WITH SERIOUS COMORBIDITY AND BODY MASS INDEX (BMI) OF 40.0 TO 44.9 IN ADULT, UNSPECIFIED OBESITY TYPE: ICD-10-CM

## 2025-01-24 DIAGNOSIS — I10 PRIMARY HYPERTENSION: ICD-10-CM

## 2025-01-24 PROCEDURE — G2211 COMPLEX E/M VISIT ADD ON: HCPCS | Performed by: STUDENT IN AN ORGANIZED HEALTH CARE EDUCATION/TRAINING PROGRAM

## 2025-01-24 PROCEDURE — 3075F SYST BP GE 130 - 139MM HG: CPT | Performed by: STUDENT IN AN ORGANIZED HEALTH CARE EDUCATION/TRAINING PROGRAM

## 2025-01-24 PROCEDURE — 99215 OFFICE O/P EST HI 40 MIN: CPT | Performed by: STUDENT IN AN ORGANIZED HEALTH CARE EDUCATION/TRAINING PROGRAM

## 2025-01-24 PROCEDURE — 3078F DIAST BP <80 MM HG: CPT | Performed by: STUDENT IN AN ORGANIZED HEALTH CARE EDUCATION/TRAINING PROGRAM

## 2025-01-24 RX ORDER — HYDRALAZINE HYDROCHLORIDE 10 MG/1
10 TABLET, FILM COATED ORAL 3 TIMES DAILY PRN
Qty: 90 TABLET | Refills: 0 | Status: SHIPPED | OUTPATIENT
Start: 2025-01-24 | End: 2026-01-24

## 2025-01-24 RX ORDER — HYDROCHLOROTHIAZIDE 12.5 MG/1
12.5 TABLET ORAL EVERY MORNING
Qty: 90 TABLET | Refills: 1 | Status: SHIPPED | OUTPATIENT
Start: 2025-01-24

## 2025-01-24 RX ORDER — HYDRALAZINE HYDROCHLORIDE 10 MG/1
TABLET, FILM COATED ORAL
Qty: 270 TABLET | OUTPATIENT
Start: 2025-01-24

## 2025-01-24 ASSESSMENT — PATIENT HEALTH QUESTIONNAIRE - PHQ9
SUM OF ALL RESPONSES TO PHQ QUESTIONS 1-9: 0
2. FEELING DOWN, DEPRESSED OR HOPELESS: NOT AT ALL
SUM OF ALL RESPONSES TO PHQ QUESTIONS 1-9: 0
1. LITTLE INTEREST OR PLEASURE IN DOING THINGS: NOT AT ALL
SUM OF ALL RESPONSES TO PHQ9 QUESTIONS 1 & 2: 0

## 2025-01-24 NOTE — TELEPHONE ENCOUNTER
Medication:   Requested Prescriptions     Pending Prescriptions Disp Refills    hydrALAZINE (APRESOLINE) 10 MG tablet [Pharmacy Med Name: HYDRALAZINE 10 MG TABLETS (ORANGE)] 270 tablet      Sig: TAKE 1 TABLET BY MOUTH THREE TIMES DAILY AS NEEDED(ELEVATED BLOOD PRESSURE GREATER THAN 180/100)       Last Filled:      Patient Phone Number: [phone] (home)     Last appt: 1/24/2025   Next appt: Visit date not found  No future appointments.

## 2025-01-24 NOTE — PROGRESS NOTES
Inna Steinberg (:  1980) is a 44 y.o. female,Established patient, here for evaluation of the following chief complaint(s):  Hypertension    Patient with medical history significant for hypertension, hyperlipidemia, ELIANA      Patient with past medical history significant for ELIANA, hyperlipidemia, asthma, hypertension, iron deficiency anemia, OA,      The 10-year ASCVD risk score (Mattie DK, et al., 2019) is: 4.7%    Values used to calculate the score:      Age: 44 years      Sex: Female      Is Non- : Yes      Diabetic: No      Tobacco smoker: No      Systolic Blood Pressure: 138 mmHg      Is BP treated: Yes      HDL Cholesterol: 46 mg/dL      Total Cholesterol: 195 mg/dL      ASSESSMENT/PLAN:    1. Follow-up exam  Presented to ER yesterday with atypical chest pain and uncontrolled hypertension.  Blood pressure elevated while she was not taking her medication.  Restart her on hydrochlorothiazide, prescribed hydralazine to take only as needed.     2. Primary hypertension  Improving, restart on hydrochlorothiazide, use of hydralazine as needed.  - hydroCHLOROthiazide 12.5 MG tablet; Take 1 tablet by mouth every morning  Dispense: 90 tablet; Refill: 1  - hydrALAZINE (APRESOLINE) 10 MG tablet; Take 1 tablet by mouth 3 times daily as needed (Elevated BP > 180/100)  Dispense: 90 tablet; Refill: 0    3. Chest pain, unspecified type  Was seen in the ER for chest pain, EKG normal sinus rhythm, anterior infarct, age indeterminate.  Negative troponin and D-dimer.  Nuclear stress test ordered for the patient.  Unable to exercise given her arthritis.  - Nuclear stress test with myocardial perfusion; Future    4.  Class III severe obesity with serious comorbidity and BMI of 40.0-44.9 in adult, unspecified obesity type  Being monitored by specialist, educated on low-salt diet, low carbs diet and exercise as tolerated.  5. ELIANA (obstructive sleep apnea)  Noncompliant to CPAP, did not do well on

## 2025-05-30 DIAGNOSIS — I10 PRIMARY HYPERTENSION: ICD-10-CM

## 2025-05-30 RX ORDER — HYDRALAZINE HYDROCHLORIDE 10 MG/1
TABLET, FILM COATED ORAL
Qty: 90 TABLET | Refills: 0 | Status: SHIPPED | OUTPATIENT
Start: 2025-05-30

## (undated) DEVICE — BLADELESS OBTURATOR: Brand: WECK VISTA

## (undated) DEVICE — GOWN,SIRUS,POLYRNF,SETINSLV,L,20/CS: Brand: MEDLINE

## (undated) DEVICE — 1810 FOAM BLOCK NEEDLE COUNTER: Brand: DEVON

## (undated) DEVICE — COLUMN DRAPE

## (undated) DEVICE — PAD TBL OP RM TRENDELENBURG STATIC TORSO W/STRAPS

## (undated) DEVICE — TIP COVER ACCESSORY

## (undated) DEVICE — SOLUTION IV 1000ML LAC RINGERS PH 6.5 INJ USP VIAFLX PLAS

## (undated) DEVICE — SUTURE PDS II SZ 0 L27IN ABSRB VLT L26MM CT-2 1/2 CIR Z334H

## (undated) DEVICE — STERILE MEDICINE CUP 2 OZ KIT: Brand: CARDINAL HEALTH

## (undated) DEVICE — BLADE ES L4IN INSUL EDGE

## (undated) DEVICE — INTENDED FOR TISSUE SEPARATION, AND OTHER PROCEDURES THAT REQUIRE A SHARP SURGICAL BLADE TO PUNCTURE OR CUT.: Brand: BARD-PARKER ® STAINLESS STEEL BLADES

## (undated) DEVICE — TOWEL,OR,DSP,ST,BLUE,STD,4/PK,20PK/CS: Brand: MEDLINE

## (undated) DEVICE — GLOVE SURG SZ 75 L12IN FNGR THK94MIL STD WHT LTX FREE

## (undated) DEVICE — GOWN SIRUS NONREIN XL W/TWL: Brand: MEDLINE INDUSTRIES, INC.

## (undated) DEVICE — PAD,NON-ADHERENT,3X8,STERILE,LF,1/PK: Brand: MEDLINE

## (undated) DEVICE — CANNULA SEAL

## (undated) DEVICE — Device

## (undated) DEVICE — ARM DRAPE

## (undated) DEVICE — SUTURE PERMA-HAND SZ 2-0 L30IN NONABSORBABLE BLK L26MM SH K833H

## (undated) DEVICE — MEGA SUTURECUT ND: Brand: ENDOWRIST

## (undated) DEVICE — PVC URETHRAL CATHETER: Brand: DOVER

## (undated) DEVICE — SOLUTION IV IRRIG WATER 1000ML POUR BRL 2F7114

## (undated) DEVICE — SYRINGE MED 10ML LUERLOCK TIP W/O SFTY DISP

## (undated) DEVICE — PROCTO SWABS: Brand: DEROYAL

## (undated) DEVICE — ELECTRODE PT RET AD L9FT HI MOIST COND ADH HYDRGEL CORDED

## (undated) DEVICE — SUTURE VCRL SZ 0 L27IN ABSRB UD L26MM CT-2 1/2 CIR J270H

## (undated) DEVICE — SUTURE VCRL SZ 0 L36IN ABSRB UD L36MM CT-1 1/2 CIR J946H

## (undated) DEVICE — TROCAR: Brand: KII FIOS FIRST ENTRY

## (undated) DEVICE — SUTURE MCRYL SZ 4-0 L18IN ABSRB UD L19MM PS-2 3/8 CIR PRIM Y496G

## (undated) DEVICE — KIT SURG PREP POVIDONE IOD WET FOR UNIV USE SPNG STK

## (undated) DEVICE — GOWN SIRUS NONREIN LG W/TWL: Brand: MEDLINE INDUSTRIES, INC.

## (undated) DEVICE — 3M™ TEGADERM™ TRANSPARENT FILM DRESSING FRAME STYLE, 1624W, 2-3/8 IN X 2-3/4 IN (6 CM X 7 CM), 100/CT 4CT/CASE: Brand: 3M™ TEGADERM™

## (undated) DEVICE — CATHETER IV 20GA L1.25IN PNK FEP SFTY STR HUB RADPQ DISP

## (undated) DEVICE — SET GRAV VENT NVENT CK VLV 3 NDL FREE PRT 10 GTT

## (undated) DEVICE — NEEDLE SPNL 20GA L3.5IN YEL HUB S STL REG WALL FIT STYL W/

## (undated) DEVICE — SYSTEM ES CUP DIA3.5CM PNEUMO OCCL BLLN DISP FOR CLIN POS

## (undated) DEVICE — SYSTEM SMK EVAC LAP TBNG FILTER HSNG BENT STYL PNK SEE CLR

## (undated) DEVICE — PUMP SUC IRR TBNG L10FT W/ HNDPC ASSEMB STRYKEFLOW 2

## (undated) DEVICE — TISSUE RETRIEVAL SYSTEM: Brand: INZII RETRIEVAL SYSTEM

## (undated) DEVICE — TRAY PROC INTRACERVICAL LEEP DISPOSABLE REDIKIT

## (undated) DEVICE — DRAPE,UNDERBUTTOCKS,PCH,STERILE: Brand: MEDLINE

## (undated) DEVICE — QUILTED PREMIUM COMFORT UNDERPAD,EXTRA HEAVY: Brand: WINGS

## (undated) DEVICE — LONG TIP FORCEPS: Brand: ENDOWRIST

## (undated) DEVICE — FENESTRATED BIPOLAR FORCEPS: Brand: ENDOWRIST

## (undated) DEVICE — GLOVE SURG SZ 65 L12IN FNGR THK75MIL WHT LTX POLYMER BEAD

## (undated) DEVICE — SYRINGE BLB 50CC IRRIG PLIABLE FNGR FLNG GRAD FLSK DISP

## (undated) DEVICE — SKIN MARKER,REGULAR TIP WITH RULER AND LABELS: Brand: DEVON

## (undated) DEVICE — SCISSORS SURG DIA8MM MPLR CRV ENDOWRIST

## (undated) DEVICE — SYRINGE MED 10ML TRNSLUC BRL PLUNG BLK MRK POLYPR CTRL

## (undated) DEVICE — X-RAY DETECTABLE SPONGES,16 PLY: Brand: VISTEC

## (undated) DEVICE — LIGHT HANDLE: Brand: DEVON

## (undated) DEVICE — GLOVE SURG SZ 65 THK91MIL LTX FREE SYN POLYISOPRENE

## (undated) DEVICE — TROCAR: Brand: KII OPTICAL ACCESS SYSTEM

## (undated) DEVICE — SET ADMIN PRIMING 7ML L30IN 7.35LB 20 GTT 2ND RLER CLMP

## (undated) DEVICE — TRAY PREP DRY W/ PREM GLV 2 APPL 6 SPNG 2 UNDPD 1 OVERWRAP

## (undated) DEVICE — PACK LITH I INCL DRP W ATTCH LEG BK TBL CVR ABSRB TWL CUST